# Patient Record
Sex: FEMALE | Race: WHITE | NOT HISPANIC OR LATINO | Employment: FULL TIME | URBAN - METROPOLITAN AREA
[De-identification: names, ages, dates, MRNs, and addresses within clinical notes are randomized per-mention and may not be internally consistent; named-entity substitution may affect disease eponyms.]

---

## 2017-01-10 ENCOUNTER — TELEPHONE (OUTPATIENT)
Dept: GASTROENTEROLOGY | Facility: CLINIC | Age: 67
End: 2017-01-10

## 2017-01-10 NOTE — TELEPHONE ENCOUNTER
Patient states she is gaining weight and has to see endocrinologist and gastro but is concerned about paying 2 copays she will cancel this appointment now and will call back if wants to reschedule.

## 2017-01-11 ENCOUNTER — HOSPITAL ENCOUNTER (OUTPATIENT)
Dept: RADIOLOGY | Facility: HOSPITAL | Age: 67
Discharge: HOME | End: 2017-01-11
Attending: INTERNAL MEDICINE
Payer: MEDICARE

## 2017-01-11 ENCOUNTER — TELEPHONE (OUTPATIENT)
Dept: ENDOCRINOLOGY | Facility: CLINIC | Age: 67
End: 2017-01-11

## 2017-01-11 ENCOUNTER — TELEPHONE (OUTPATIENT)
Dept: FAMILY MEDICINE | Facility: CLINIC | Age: 67
End: 2017-01-11

## 2017-01-11 ENCOUNTER — LAB VISIT (OUTPATIENT)
Dept: LAB | Facility: HOSPITAL | Age: 67
End: 2017-01-11
Attending: INTERNAL MEDICINE
Payer: MEDICARE

## 2017-01-11 ENCOUNTER — OFFICE VISIT (OUTPATIENT)
Dept: ENDOCRINOLOGY | Facility: CLINIC | Age: 67
End: 2017-01-11
Payer: MEDICARE

## 2017-01-11 VITALS
HEIGHT: 62 IN | DIASTOLIC BLOOD PRESSURE: 70 MMHG | HEART RATE: 78 BPM | WEIGHT: 190.25 LBS | BODY MASS INDEX: 35.01 KG/M2 | SYSTOLIC BLOOD PRESSURE: 128 MMHG

## 2017-01-11 DIAGNOSIS — E04.2 MULTINODULAR GOITER: ICD-10-CM

## 2017-01-11 DIAGNOSIS — Z92.3 H/O HEAD AND NECK RADIATION: ICD-10-CM

## 2017-01-11 DIAGNOSIS — E04.2 MULTINODULAR GOITER: Primary | ICD-10-CM

## 2017-01-11 DIAGNOSIS — F17.200 SMOKING: ICD-10-CM

## 2017-01-11 DIAGNOSIS — R63.5 WEIGHT GAIN: Primary | ICD-10-CM

## 2017-01-11 LAB — TSH SERPL DL<=0.005 MIU/L-ACNC: 1.79 UIU/ML

## 2017-01-11 PROCEDURE — 1157F ADVNC CARE PLAN IN RCRD: CPT | Mod: S$GLB,,, | Performed by: INTERNAL MEDICINE

## 2017-01-11 PROCEDURE — 76536 US EXAM OF HEAD AND NECK: CPT | Mod: 26,,, | Performed by: RADIOLOGY

## 2017-01-11 PROCEDURE — 76536 US EXAM OF HEAD AND NECK: CPT | Mod: TC

## 2017-01-11 PROCEDURE — 1160F RVW MEDS BY RX/DR IN RCRD: CPT | Mod: S$GLB,,, | Performed by: INTERNAL MEDICINE

## 2017-01-11 PROCEDURE — 99499 UNLISTED E&M SERVICE: CPT | Mod: S$GLB,,, | Performed by: INTERNAL MEDICINE

## 2017-01-11 PROCEDURE — 36415 COLL VENOUS BLD VENIPUNCTURE: CPT | Mod: PO

## 2017-01-11 PROCEDURE — 99204 OFFICE O/P NEW MOD 45 MIN: CPT | Mod: S$GLB,,, | Performed by: INTERNAL MEDICINE

## 2017-01-11 PROCEDURE — 84443 ASSAY THYROID STIM HORMONE: CPT

## 2017-01-11 PROCEDURE — 99999 PR PBB SHADOW E&M-EST. PATIENT-LVL III: CPT | Mod: PBBFAC,,, | Performed by: INTERNAL MEDICINE

## 2017-01-11 PROCEDURE — 1126F AMNT PAIN NOTED NONE PRSNT: CPT | Mod: S$GLB,,, | Performed by: INTERNAL MEDICINE

## 2017-01-11 PROCEDURE — 1159F MED LIST DOCD IN RCRD: CPT | Mod: S$GLB,,, | Performed by: INTERNAL MEDICINE

## 2017-01-11 RX ORDER — ESTRADIOL 0.1 MG/G
1 CREAM VAGINAL
COMMUNITY
End: 2017-05-29

## 2017-01-11 NOTE — MR AVS SNAPSHOT
[facility]  [address]  [facility] [address]  Phone: [phone]  Fax: [phone]                  Syeda Gonsalves   2017 9:30 AM   Office Visit    Description:  Female : 1950   Provider:  Hailey Carty MD   Department:  [place] - Endocrinology           Reason for Visit     Thyroid Nodule     Hoarsness     Weight Gain           Diagnoses this Visit        Comments    Weight gain    -  Primary     Multinodular goiter         H/O head and neck radiation         Smoking                To Do List           Future Appointments        Provider Department Dept Phone    2017 11:15 AM LAB, [place] [facility] [phone]    2017 3:15 PM NM US1 [facility] [phone]    2017 10:15 AM [facility] - Lab [phone]    2017 11:30 AM Hailey Carty MD [facility] [phone]      Goals (5 Years of Data)     None      [facility]     [facility] Care [facility] -  Assistance  Registered nurses in the [facility] provide clinical advisement, health education, appointment booking, and other advisory services.  Call for this free service at [phone].             Medications           Message regarding Medications     Verify the changes and/or additions to your medication regime listed below are the same as discussed with your clinician today.  If any of these changes or additions are incorrect, please notify your healthcare provider.        STOP taking these medications     buPROPion (WELLBUTRIN) 75 MG tablet Take 1 tablet (75 mg total) by mouth 2 (two) times daily.    estrogens, conjugated, (PREMARIN) 0.45 MG tablet Take 0.45 mg by mouth once daily.           Verify that the below list of medications is an accurate representation of the medications you are currently taking.  If none reported, the list may be blank. If incorrect, please contact your  "healthcare provider. Carry this list with you in case of emergency.           Current Medications     co-enzyme Q-10 30 mg capsule Take 30 mg by mouth 3 (three) times daily.    estradiol (ESTRACE) 0.01 % (0.1 mg/gram) vaginal cream Place 1 g vaginally every 7 days.    fish oil-omega-3 fatty acids 300-1,000 mg capsule Take 2 g by mouth once daily.    GARLIC (GARLIQUE ORAL) Take 1 capsule by mouth once daily.    meloxicam (MOBIC) 7.5 MG tablet Take 1 tablet (7.5 mg total) by mouth once daily.    MULTIVITAMIN (MULTIPLE VITAMINS ORAL) Take by mouth.    omeprazole (PRILOSEC) 20 MG capsule Take 1 capsule (20 mg total) by mouth once daily.    colesevelam (WELCHOL) 625 mg tablet Take 1,875 mg by mouth 2 (two) times daily with meals.           Clinical Reference Information           Vital Signs - Last Recorded  Most recent update: 1/11/2017  9:27 AM by Irena Luque LPN    BP Pulse Ht Wt LMP BMI    128/70 (BP Method: Manual) 78 5' 2" (1.575 m) 86.3 kg (190 lb 4.1 oz) 09/25/1990 34.8 kg/m2      Blood Pressure          Most Recent Value    BP  128/70      Allergies as of 1/11/2017     Flu Vaccine 2011 (36 Mos+)(pf)    Iodine And Iodide Containing Products    Lipitor [Atorvastatin]    Niacin Preparations    Welchol [Colesevelam]    Zetia [Ezetimibe]      Immunizations Administered on Date of Encounter - 1/11/2017     None      Orders Placed During Today's Visit      Normal Orders This Visit    Ambulatory referral to Smoking Cessation Program     Future Labs/Procedures Expected by Expires    TSH  1/11/2017 3/12/2018    US Soft Tissue Head Neck Thyroid  1/11/2017 1/11/2018      "

## 2017-01-11 NOTE — PROGRESS NOTES
Subjective:      Patient ID: Syeda Gonsalves is a 66 y.o. female.    Chief Complaint:  Thyroid Nodule; Hoarsness; and Weight Gain      History of Present Illness    + hoarseness of voice   She had US thyroid in 2016   Per her - her PCP checked TSH, FT4, FT3 and all ernestine per her      She has weight gain of ~ 10 lbs in 3-4 months   She eats 2-3 meals a day   BF : cereal / Yoruba toast - small   Lunch : usually   After work some : sandwich   Mainly abdominal weight gain     Works at [employer] late evenings - 4-5 days a week    No FH of  thyroid cancer  She had radiation of neck for thymus gland shrinking      Past Medical History   Diagnosis Date    Abnormal Pap smear 1990    Cervical cancer 1990     Had a complete hysterectomy    Hyperlipidemia      Past Surgical History   Procedure Laterality Date    Hysterectomy      Oophorectomy      Carpal tunnel release      Adrenalectomy               Review of Systems   Constitutional: Positive for unexpected weight change.   HENT: Positive for voice change. Negative for trouble swallowing.    Eyes: Negative for visual disturbance.   Respiratory: Negative for shortness of breath.         Overnight ' funny noice'   Gastrointestinal: Negative for constipation and diarrhea.   Endocrine: Positive for cold intolerance.   Genitourinary: Negative for menstrual problem (s/p complete hysterectomy ).   Psychiatric/Behavioral: Positive for sleep disturbance (occassional sleep problem ).       Objective:   Physical Exam   Constitutional: She appears well-developed.   HENT:   Right Ear: External ear normal.   Left Ear: External ear normal.   Nose: Nose normal.   Neck: No tracheal deviation present. Thyromegaly (right thyroid nodule ) present.   Cardiovascular: Normal rate.    No murmur heard.  Pulmonary/Chest: Effort normal and breath sounds normal.   Abdominal: Soft. There is no tenderness. No hernia.   Musculoskeletal: She exhibits no edema.   Neurological: She displays normal  reflexes. No cranial nerve deficit.   Skin: No rash noted.          Psychiatric: She has a normal mood and affect. Judgment normal.   Vitals reviewed.      Lab Review:   None     Assessment:     1. Weight gain     2. Multinodular goiter  US Soft Tissue Head Neck Thyroid    TSH   3. H/O head and neck radiation     4. Smoking  Ambulatory referral to Smoking Cessation Program        # MNG  - has h/o radiation to neck   - smokes on/off: advised to quit   - we will get US thyroid reports from her PCP   - also schedule US thyroid     # smoking cessation program referral       Plan:     Follow up:  TSH today   US thyroid and TSH in 4 months  RTC in 4 months     smoking cessation program referral

## 2017-01-11 NOTE — TELEPHONE ENCOUNTER
Palma from Dr Carty's office is looking for imaging on patient's thyroid. After researching I was unable to locate where thyroid imaging was ordered for the patient by Dr. Gaitan at any time

## 2017-01-12 NOTE — TELEPHONE ENCOUNTER
Please let Ms.Syeda DALLIN Gonsalves know her CROWE thyroid shows nodules and we should get FNA of B/L nodule both sides right and left   Also schedule next available

## 2017-01-13 ENCOUNTER — TELEPHONE (OUTPATIENT)
Dept: ENDOCRINOLOGY | Facility: CLINIC | Age: 67
End: 2017-01-13

## 2017-01-13 NOTE — TELEPHONE ENCOUNTER
Pt informed of US results and Dr. Carty's recommendation to do bilateral FNA.  Appt scheduled and pt instructed no ASA or NSAIDS x 72 hrs prior.

## 2017-01-31 ENCOUNTER — OFFICE VISIT (OUTPATIENT)
Dept: FAMILY MEDICINE | Facility: CLINIC | Age: 67
End: 2017-01-31
Payer: MEDICARE

## 2017-01-31 ENCOUNTER — DOCUMENTATION ONLY (OUTPATIENT)
Dept: FAMILY MEDICINE | Facility: CLINIC | Age: 67
End: 2017-01-31

## 2017-01-31 VITALS
HEART RATE: 62 BPM | HEIGHT: 62 IN | TEMPERATURE: 98 F | DIASTOLIC BLOOD PRESSURE: 73 MMHG | RESPIRATION RATE: 16 BRPM | SYSTOLIC BLOOD PRESSURE: 123 MMHG | BODY MASS INDEX: 34.77 KG/M2 | WEIGHT: 188.94 LBS | OXYGEN SATURATION: 97 %

## 2017-01-31 DIAGNOSIS — Z11.59 ENCOUNTER FOR HEPATITIS C SCREENING TEST FOR LOW RISK PATIENT: ICD-10-CM

## 2017-01-31 DIAGNOSIS — Z78.0 POSTMENOPAUSAL: ICD-10-CM

## 2017-01-31 DIAGNOSIS — J02.9 ACUTE PHARYNGITIS, UNSPECIFIED ETIOLOGY: Primary | ICD-10-CM

## 2017-01-31 PROCEDURE — 1157F ADVNC CARE PLAN IN RCRD: CPT | Mod: S$GLB,,, | Performed by: INTERNAL MEDICINE

## 2017-01-31 PROCEDURE — 99213 OFFICE O/P EST LOW 20 MIN: CPT | Mod: S$GLB,,, | Performed by: INTERNAL MEDICINE

## 2017-01-31 PROCEDURE — 1160F RVW MEDS BY RX/DR IN RCRD: CPT | Mod: S$GLB,,, | Performed by: INTERNAL MEDICINE

## 2017-01-31 PROCEDURE — 1125F AMNT PAIN NOTED PAIN PRSNT: CPT | Mod: S$GLB,,, | Performed by: INTERNAL MEDICINE

## 2017-01-31 PROCEDURE — 1159F MED LIST DOCD IN RCRD: CPT | Mod: S$GLB,,, | Performed by: INTERNAL MEDICINE

## 2017-01-31 RX ORDER — PREDNISONE 20 MG/1
40 TABLET ORAL DAILY
Qty: 4 TABLET | Refills: 0 | Status: SHIPPED | OUTPATIENT
Start: 2017-01-31 | End: 2017-02-02

## 2017-01-31 RX ORDER — SUCRALFATE 1 G/1
TABLET ORAL
Qty: 32 TABLET | Refills: 0 | Status: SHIPPED | OUTPATIENT
Start: 2017-01-31 | End: 2017-02-10 | Stop reason: SDUPTHER

## 2017-01-31 NOTE — PATIENT INSTRUCTIONS
Warm compresses to face. Saline washing. Steam treatments.       Chloraseptic lozenges during the day.

## 2017-01-31 NOTE — PROGRESS NOTES
"Subjective:       Patient ID: Syeda Gonsalves is a 66 y.o. female.    Chief Complaint: Sore Throat; Fatigue; and Sinus Problem    HPI       CHIEF COMPLAINT: Sore_Throat(+).  HPI:     ONSET/TIMING:   Started     5 days and 6 ago.  Similar problems in past: no.      DURATION:    continuous    QUALITY/COURSE:    .   unchanged    LOCATION:  bilateral    INTENSITY  SEVERITY: # 5 / 10 (on 1 to 10 scale).    CONTEXT/WHEN:   Similar problems: no. Physician visits:  no . Exposure_to_others_with_similar_symptoms: no  Exposure_to_others_with_similar_documented strep throat: no  MODIFIERS/TREATMENTS: . Analgesics: yes   Antibiotics: no.        The following symptoms are positive if BOLDED, otherwise negative.        SYMPTOMS/RELATED:  Lymphadenopathy.Voice_Change.    Heartburn .         Review of Systems   Constitutional: Negative for fever.   HENT: Positive for sinus pressure and sore throat. Negative for ear pain, rhinorrhea, sneezing and trouble swallowing.    Respiratory: Negative for cough.    Neurological: Negative for headaches.       Objective:      Vitals:    01/31/17 1153   BP: 123/73   Pulse: 62   Resp: 16   Temp: 97.7 °F (36.5 °C)   TempSrc: Oral   SpO2: 97%   Weight: 85.7 kg (188 lb 15 oz)   Height: 5' 2" (1.575 m)   PainSc:   7   PainLoc: Throat     Physical Exam   Constitutional: She appears well-developed and well-nourished.   HENT:   Right Ear: External ear normal.   Left Ear: External ear normal.   Mouth/Throat: Oropharynx is clear and moist. No oropharyngeal exudate.   Cardiovascular: Normal rate, regular rhythm and normal heart sounds.  Exam reveals no friction rub.    No murmur heard.  Pulmonary/Chest: Effort normal and breath sounds normal. No respiratory distress. She has no wheezes. She has no rales. She exhibits no tenderness.   Abdominal: Soft. Bowel sounds are normal. There is no tenderness.   Musculoskeletal: She exhibits no edema.   Lymphadenopathy:     She has no cervical adenopathy.   Nursing " note and vitals reviewed.        Assessment:       1. Acute pharyngitis, unspecified etiology    2. Encounter for hepatitis C screening test for low risk patient    3. Postmenopausal          Plan:     Acute pharyngitis, unspecified etiology  -     sucralfate (CARAFATE) 1 gram tablet; 4 by mouth dissolved with cranberry juice at bedtime, gargle and swallow  Dispense: 32 tablet; Refill: 0  -     predniSONE (DELTASONE) 20 MG tablet; Take 2 tablets (40 mg total) by mouth once daily.  Dispense: 4 tablet; Refill: 0    Encounter for hepatitis C screening test for low risk patient  -     Hepatitis C antibody; Future; Expected date: 1/31/17    Postmenopausal  -     DXA Bone Density Spine And Hip_Axial Skeleton; Future; Expected date: 1/31/17      Return for if you are not better return in one week.

## 2017-01-31 NOTE — PROGRESS NOTES
Health Maintenance Due   Topic Date Due    Hepatitis C Screening  1950    DEXA SCAN  12/31/1989    Colonoscopy  12/31/1999    Influenza Vaccine  08/01/2016    Lipid Panel  12/14/2016    Pneumococcal (65+) (2 of 2 - PPSV23) 12/23/2016

## 2017-01-31 NOTE — MR AVS SNAPSHOT
[facility]  [address] [address]  Phone: [phone]  Fax: [phone]                  Syeda Gonsalves   2017 11:00 AM   Office Visit    Description:  Female : 1950   Provider:  Rikki Gaitan MD   Department:  [facility]           Reason for Visit     Sore Throat     Fatigue     Sinus Problem           Diagnoses this Visit        Comments    Acute pharyngitis, unspecified etiology    -  Primary     Encounter for hepatitis C screening test for low risk patient         Postmenopausal                To Do List           Future Appointments        Provider Department Dept Phone    2017 1:40 PM [facility] DEXA 1 [facility] [phone]    [facility] [phone]    2017 11[facility]30 [facility] [phone]    2017 10:15 AM [facility] - Lab [phone]    2017 11:30 AM Hailey Carty MD [facility] Endocrinology [phone]      Goals (5 Years of Data)     None      Follow-Up and Disposition     Return for if you are not better return in one week.    Follow-up and Disposition History       These Medications        Disp Refills Start End    sucralfate (CARAFATE) 1 gram tablet 32 tablet 0 2017     4 by mouth dissolved with cranberry juice at bedtime, gargle and swallow    Pharmacy: [facility] [address] [place] [id number] [employer] Ph #: [phone]       predniSONE (DELTASONE) 20 MG tablet 4 tablet 0 2017    Take 2 tablets (40 mg total) by mouth once daily. - Oral    Pharmacy: [facility] [address] [place] [id number] [employer] Ph #: [phone]         [facility]     [facility] Care [facility] -  Assistance  Registered nurses in the [facility] Call Center provide clinical advisement, health education, appointment booking, and other advisory  services.  Call for this free service at [phone].             Medications           Message regarding Medications     Verify the changes and/or additions to your medication regime listed below are the same as discussed with your clinician today.  If any of these changes or additions are incorrect, please notify your healthcare provider.        START taking these NEW medications        Refills    sucralfate (CARAFATE) 1 gram tablet 0    Si by mouth dissolved with cranberry juice at bedtime, gargle and swallow    Class: Normal    predniSONE (DELTASONE) 20 MG tablet 0    Sig: Take 2 tablets (40 mg total) by mouth once daily.    Class: Normal    Route: Oral      STOP taking these medications     colesevelam (WELCHOL) 625 mg tablet Take 1,875 mg by mouth 2 (two) times daily with meals.    omeprazole (PRILOSEC) 20 MG capsule Take 1 capsule (20 mg total) by mouth once daily.           Verify that the below list of medications is an accurate representation of the medications you are currently taking.  If none reported, the list may be blank. If incorrect, please contact your healthcare provider. Carry this list with you in case of emergency.           Current Medications     co-enzyme Q-10 30 mg capsule Take 30 mg by mouth 3 (three) times daily.    estradiol (ESTRACE) 0.01 % (0.1 mg/gram) vaginal cream Place 1 g vaginally every 7 days.    fish oil-omega-3 fatty acids 300-1,000 mg capsule Take 2 g by mouth once daily.    GARLIC (GARLIQUE ORAL) Take 1 capsule by mouth once daily.    meloxicam (MOBIC) 7.5 MG tablet Take 1 tablet (7.5 mg total) by mouth once daily.    MULTIVITAMIN (MULTIPLE VITAMINS ORAL) Take by mouth.    predniSONE (DELTASONE) 20 MG tablet Take 2 tablets (40 mg total) by mouth once daily.    sucralfate (CARAFATE) 1 gram tablet 4 by mouth dissolved with cranberry juice at bedtime, gargle and swallow           Clinical Reference Information           Vital Signs - Last Recorded  Most recent update:  "1/31/2017 11:56 AM by Yoselyn Smith MA    BP Pulse Temp Resp Ht Wt    123/73 (BP Location: Right arm, Patient Position: Sitting, BP Method: Automatic) 62 97.7 °F (36.5 °C) (Oral) 16 5' 2" (1.575 m) 85.7 kg (188 lb 15 oz)    LMP SpO2 BMI          09/25/1990 97% 34.56 kg/m2        Blood Pressure          Most Recent Value    BP  123/73      Allergies as of 1/31/2017     Flu Vaccine 2011 (36 Mos+)(pf)    Iodine And Iodide Containing Products    Lipitor [Atorvastatin]    Niacin Preparations    Welchol [Colesevelam]    Zetia [Ezetimibe]      Immunizations Administered on Date of Encounter - 1/31/2017     None      Orders Placed During Today's Visit     Future Labs/Procedures Expected by Expires    DXA Bone Density Spine And Hip_Axial Skeleton  1/31/2017 5/1/2017    Hepatitis C antibody  1/31/2017 4/1/2018      Instructions    Warm compresses to face. Saline washing. Steam treatments.       Chloraseptic lozenges during the day.             "

## 2017-02-01 ENCOUNTER — HOSPITAL ENCOUNTER (OUTPATIENT)
Dept: RADIOLOGY | Facility: HOSPITAL | Age: 67
Discharge: HOME | End: 2017-02-01
Attending: INTERNAL MEDICINE
Payer: MEDICARE

## 2017-02-01 DIAGNOSIS — Z78.0 POSTMENOPAUSAL: ICD-10-CM

## 2017-02-01 PROCEDURE — 77080 DXA BONE DENSITY AXIAL: CPT | Mod: TC

## 2017-02-01 PROCEDURE — 77080 DXA BONE DENSITY AXIAL: CPT | Mod: 26,,, | Performed by: RADIOLOGY

## 2017-02-02 ENCOUNTER — TELEPHONE (OUTPATIENT)
Dept: FAMILY MEDICINE | Facility: CLINIC | Age: 67
End: 2017-02-02

## 2017-02-02 NOTE — TELEPHONE ENCOUNTER
----- Message from Christina Del Cid sent at 2/2/2017 11:10 AM CST -----  Patient cancelled her lab appt bindu & wants to reschedule for next Thursday please call [phone] (home)

## 2017-02-09 ENCOUNTER — CLINICAL SUPPORT (OUTPATIENT)
Dept: FAMILY MEDICINE | Facility: CLINIC | Age: 67
End: 2017-02-09
Payer: MEDICARE

## 2017-02-09 ENCOUNTER — TELEPHONE (OUTPATIENT)
Dept: FAMILY MEDICINE | Facility: CLINIC | Age: 67
End: 2017-02-09

## 2017-02-09 DIAGNOSIS — Z11.59 ENCOUNTER FOR HEPATITIS C SCREENING TEST FOR LOW RISK PATIENT: ICD-10-CM

## 2017-02-09 PROCEDURE — 86803 HEPATITIS C AB TEST: CPT

## 2017-02-09 NOTE — TELEPHONE ENCOUNTER
----- Message from Mayra Thompson sent at 2/9/2017  3:54 PM CST -----  Pt called asking for a call back stated that she think she may need an antibiotic/pls call back at [phone]

## 2017-02-10 ENCOUNTER — OFFICE VISIT (OUTPATIENT)
Dept: FAMILY MEDICINE | Facility: CLINIC | Age: 67
End: 2017-02-10
Payer: MEDICARE

## 2017-02-10 VITALS
HEART RATE: 58 BPM | WEIGHT: 188.06 LBS | HEIGHT: 62 IN | OXYGEN SATURATION: 98 % | BODY MASS INDEX: 34.61 KG/M2 | TEMPERATURE: 98 F | DIASTOLIC BLOOD PRESSURE: 65 MMHG | SYSTOLIC BLOOD PRESSURE: 134 MMHG

## 2017-02-10 DIAGNOSIS — Z13.6 ENCOUNTER FOR LIPID SCREENING FOR CARDIOVASCULAR DISEASE: ICD-10-CM

## 2017-02-10 DIAGNOSIS — Z13.29 SCREENING FOR ENDOCRINE, NUTRITIONAL, METABOLIC AND IMMUNITY DISORDER: ICD-10-CM

## 2017-02-10 DIAGNOSIS — Z13.220 SCREENING CHOLESTEROL LEVEL: ICD-10-CM

## 2017-02-10 DIAGNOSIS — Z13.0 SCREENING FOR ENDOCRINE, NUTRITIONAL, METABOLIC AND IMMUNITY DISORDER: ICD-10-CM

## 2017-02-10 DIAGNOSIS — J02.9 ACUTE PHARYNGITIS, UNSPECIFIED ETIOLOGY: ICD-10-CM

## 2017-02-10 DIAGNOSIS — L25.9 CONTACT DERMATITIS AND ECZEMA: ICD-10-CM

## 2017-02-10 DIAGNOSIS — Z13.228 SCREENING FOR ENDOCRINE, NUTRITIONAL, METABOLIC AND IMMUNITY DISORDER: ICD-10-CM

## 2017-02-10 DIAGNOSIS — Z13.21 SCREENING FOR ENDOCRINE, NUTRITIONAL, METABOLIC AND IMMUNITY DISORDER: ICD-10-CM

## 2017-02-10 DIAGNOSIS — J30.1 NON-SEASONAL ALLERGIC RHINITIS DUE TO POLLEN: ICD-10-CM

## 2017-02-10 DIAGNOSIS — J02.9 SORE THROAT: Primary | ICD-10-CM

## 2017-02-10 DIAGNOSIS — Z23 NEED FOR STREPTOCOCCUS PNEUMONIAE VACCINATION: ICD-10-CM

## 2017-02-10 DIAGNOSIS — Z13.220 ENCOUNTER FOR LIPID SCREENING FOR CARDIOVASCULAR DISEASE: ICD-10-CM

## 2017-02-10 LAB
ALBUMIN SERPL BCP-MCNC: 3.5 G/DL
ALP SERPL-CCNC: 66 U/L
ALT SERPL W/O P-5'-P-CCNC: 13 U/L
ANION GAP SERPL CALC-SCNC: 9 MMOL/L
AST SERPL-CCNC: 19 U/L
BILIRUB SERPL-MCNC: 0.6 MG/DL
BUN SERPL-MCNC: 12 MG/DL
CALCIUM SERPL-MCNC: 9.5 MG/DL
CHLORIDE SERPL-SCNC: 104 MMOL/L
CHOLEST/HDLC SERPL: 5.3 {RATIO}
CO2 SERPL-SCNC: 27 MMOL/L
CREAT SERPL-MCNC: 0.8 MG/DL
CTP QC/QA: YES
EST. GFR  (AFRICAN AMERICAN): >60 ML/MIN/1.73 M^2
EST. GFR  (NON AFRICAN AMERICAN): >60 ML/MIN/1.73 M^2
GLUCOSE SERPL-MCNC: 87 MG/DL
HCV AB SERPL QL IA: NEGATIVE
HDL/CHOLESTEROL RATIO: 19 %
HDLC SERPL-MCNC: 211 MG/DL
HDLC SERPL-MCNC: 40 MG/DL
LDLC SERPL CALC-MCNC: 137.8 MG/DL
NONHDLC SERPL-MCNC: 171 MG/DL
POTASSIUM SERPL-SCNC: 4.2 MMOL/L
PROT SERPL-MCNC: 6.9 G/DL
S PYO RRNA THROAT QL PROBE: NEGATIVE
SODIUM SERPL-SCNC: 140 MMOL/L
TRIGL SERPL-MCNC: 166 MG/DL

## 2017-02-10 PROCEDURE — 80061 LIPID PANEL: CPT

## 2017-02-10 PROCEDURE — 90732 PPSV23 VACC 2 YRS+ SUBQ/IM: CPT | Mod: S$GLB,,, | Performed by: NURSE PRACTITIONER

## 2017-02-10 PROCEDURE — 1159F MED LIST DOCD IN RCRD: CPT | Mod: S$GLB,,, | Performed by: NURSE PRACTITIONER

## 2017-02-10 PROCEDURE — 80053 COMPREHEN METABOLIC PANEL: CPT

## 2017-02-10 PROCEDURE — 87880 STREP A ASSAY W/OPTIC: CPT | Mod: ,,, | Performed by: NURSE PRACTITIONER

## 2017-02-10 PROCEDURE — 1125F AMNT PAIN NOTED PAIN PRSNT: CPT | Mod: S$GLB,,, | Performed by: NURSE PRACTITIONER

## 2017-02-10 PROCEDURE — G0009 ADMIN PNEUMOCOCCAL VACCINE: HCPCS | Mod: S$GLB,,, | Performed by: NURSE PRACTITIONER

## 2017-02-10 PROCEDURE — 1160F RVW MEDS BY RX/DR IN RCRD: CPT | Mod: S$GLB,,, | Performed by: NURSE PRACTITIONER

## 2017-02-10 PROCEDURE — 99213 OFFICE O/P EST LOW 20 MIN: CPT | Mod: 25,S$GLB,, | Performed by: NURSE PRACTITIONER

## 2017-02-10 PROCEDURE — 1157F ADVNC CARE PLAN IN RCRD: CPT | Mod: S$GLB,,, | Performed by: NURSE PRACTITIONER

## 2017-02-10 RX ORDER — SUCRALFATE 1 G/1
TABLET ORAL
Qty: 32 TABLET | Refills: 1 | Status: SHIPPED | OUTPATIENT
Start: 2017-02-10 | End: 2017-03-06

## 2017-02-10 RX ORDER — MONTELUKAST SODIUM 5 MG/1
5 TABLET, CHEWABLE ORAL NIGHTLY
Qty: 30 TABLET | Refills: 11 | Status: SHIPPED | OUTPATIENT
Start: 2017-02-10 | End: 2017-03-12

## 2017-02-10 RX ORDER — FLUOCINONIDE 0.5 MG/G
CREAM TOPICAL 2 TIMES DAILY
Qty: 1 TUBE | Refills: 0 | Status: SHIPPED | OUTPATIENT
Start: 2017-02-10 | End: 2017-03-22

## 2017-02-10 NOTE — PROGRESS NOTES
Subjective:       Patient ID: Syeda Gonsalves is a 66 y.o. female.    Chief Complaint: Sore Throat    Sore Throat    This is a new problem. The current episode started 1 to 4 weeks ago. The problem has been gradually improving. There has been no fever. She has had no exposure to strep or mono. She has tried gargles for the symptoms. The treatment provided mild relief.     Review of Systems   HENT: Positive for sore throat.        Objective:      Physical Exam   Constitutional: She appears well-developed and well-nourished. No distress.   HENT:   Head: Normocephalic and atraumatic.   Right Ear: External ear normal.   Left Ear: External ear normal.   Nose: Nose normal.   Mouth/Throat: Oropharynx is clear and moist. No oropharyngeal exudate.   Eyes: Conjunctivae are normal. Right eye exhibits no discharge. Left eye exhibits no discharge. No scleral icterus.   Neck: Normal range of motion. Neck supple.   Cardiovascular: Normal rate, regular rhythm and normal heart sounds.  Exam reveals no gallop and no friction rub.    No murmur heard.  Pulmonary/Chest: Effort normal and breath sounds normal. No respiratory distress. She has no wheezes. She has no rales.   Lymphadenopathy:     She has no cervical adenopathy.   Skin: Skin is warm and dry. She is not diaphoretic.   Psychiatric: She has a normal mood and affect. Her behavior is normal.       Assessment:       1. Sore throat    2. Contact dermatitis and eczema    3. Non-seasonal allergic rhinitis due to pollen    4. Need for Streptococcus pneumoniae vaccination    5. Acute pharyngitis, unspecified etiology    6. Screening cholesterol level    7. Encounter for lipid screening for cardiovascular disease    8. Screening for endocrine, nutritional, metabolic and immunity disorder        Plan:       Sore throat  -     POCT rapid strep A    Contact dermatitis and eczema  -     fluocinonide 0.05% (LIDEX) 0.05 % cream; Apply topically 2 (two) times daily. Use only on rash, not  surrounding skin, no longer than 2 weeks  Dispense: 1 Tube; Refill: 0    Non-seasonal allergic rhinitis due to pollen  -     montelukast (SINGULAIR) 5 MG chewable tablet; Take 1 tablet (5 mg total) by mouth every evening.  Dispense: 30 tablet; Refill: 11    Need for Streptococcus pneumoniae vaccination  -     Pneumococcal Polysaccharide Vaccine (23 Valent) (SQ/IM)    Acute pharyngitis, unspecified etiology  -     sucralfate (CARAFATE) 1 gram tablet; 4 by mouth dissolved with cranberry juice at bedtime, gargle and swallow  Dispense: 32 tablet; Refill: 1    Screening cholesterol level    Encounter for lipid screening for cardiovascular disease  -     Lipid panel; Future; Expected date: 2/10/17    Screening for endocrine, nutritional, metabolic and immunity disorder  -     Comprehensive metabolic panel; Future; Expected date: 2/10/17    3 months

## 2017-02-10 NOTE — MR AVS SNAPSHOT
[facility]  [address] [address] [address]  Phone: [phone]  Fax: [phone]                  Syeda Gonsalves   2/10/2017 11:00 AM   Office Visit    Description:  Female : 1950   Provider:  CLEOPATRA Andres   Department:  [facility]           Reason for Visit     Sore Throat           Diagnoses this Visit        Comments    Sore throat    -  Primary     Contact dermatitis and eczema         Non-seasonal allergic rhinitis due to pollen         Need for Streptococcus pneumoniae vaccination         Acute pharyngitis, unspecified etiology         Screening cholesterol level         Encounter for lipid screening for cardiovascular disease         Screening for endocrine, nutritional, metabolic and immunity disorder                To Do List           Future Appointments        Provider Department Dept Phone    2017 11:30 AM MD Serafin Scruggs - Endocrinology [phone]    2017 10:15 AM ALFREDO POE SAT Alfredo [facility] - Lab [phone]    2017 11:30 AM Hailey Carty MD [facility] Endocrinology [phone]      Goals (5 Years of Data)     None      Follow-Up and Disposition     Return in about 3 months (around 5/10/2017), or if symptoms worsen or fail to improve.       These Medications        Disp Refills Start End    fluocinonide 0.05% (LIDEX) 0.05 % cream 1 Tube 0 2/10/2017     Apply topically 2 (two) times daily. Use only on rash, not surrounding skin, no longer than 2 weeks - Topical (Top)    Pharmacy: [facility] [address] [place] [address] [employer] Ph #: [phone]       montelukast (SINGULAIR) 5 MG chewable tablet 30 tablet 11 2/10/2017 3/12/2017    Take 1 tablet (5 mg total) by mouth every evening. - Oral    Pharmacy: [facility] [address] [place], [place] [address] - [address] [employer] Ph #: [phone]       Notes to Pharmacy: Patient unable to tolerate adult dose, giving children's dose to build  up tolerance.    sucralfate (CARAFATE) 1 gram tablet 32 tablet 1 2/10/2017     4 by mouth dissolved with cranberry juice at bedtime, gargle and swallow    Pharmacy: [facility] [address] [place] [address] #: [phone]         [facility]     [facility] On Call Nurse [facility] - 24/7 Assistance  Registered nurses in the [facility] On Call Center provide clinical advisement, health education, appointment booking, and other advisory services.  Call for this free service at [phone].             Medications           Message regarding Medications     Verify the changes and/or additions to your medication regime listed below are the same as discussed with your clinician today.  If any of these changes or additions are incorrect, please notify your healthcare provider.        START taking these NEW medications        Refills    fluocinonide 0.05% (LIDEX) 0.05 % cream 0    Sig: Apply topically 2 (two) times daily. Use only on rash, not surrounding skin, no longer than 2 weeks    Class: Normal    Route: Topical (Top)    montelukast (SINGULAIR) 5 MG chewable tablet 11    Sig: Take 1 tablet (5 mg total) by mouth every evening.    Class: Normal    Route: Oral           Verify that the below list of medications is an accurate representation of the medications you are currently taking.  If none reported, the list may be blank. If incorrect, please contact your healthcare provider. Carry this list with you in case of emergency.           Current Medications     co-enzyme Q-10 30 mg capsule Take 30 mg by mouth 3 (three) times daily.    estradiol (ESTRACE) 0.01 % (0.1 mg/gram) vaginal cream Place 1 g vaginally every 7 days.    fish oil-omega-3 fatty acids 300-1,000 mg capsule Take 2 g by mouth once daily.    GARLIC (GARLIQUE ORAL) Take 1 capsule by mouth once daily.    meloxicam (MOBIC) 7.5 MG tablet Take 1 tablet (7.5 mg total) by mouth once daily.    MULTIVITAMIN (MULTIPLE VITAMINS ORAL) Take by mouth.  "   sucralfate (CARAFATE) 1 gram tablet 4 by mouth dissolved with cranberry juice at bedtime, gargle and swallow    fluocinonide 0.05% (LIDEX) 0.05 % cream Apply topically 2 (two) times daily. Use only on rash, not surrounding skin, no longer than 2 weeks    montelukast (SINGULAIR) 5 MG chewable tablet Take 1 tablet (5 mg total) by mouth every evening.           Clinical Reference Information           Your Vitals Were     BP Pulse Temp Height Weight Last Period    134/65 (BP Location: Left arm, Patient Position: Sitting, BP Method: Automatic) 58 97.9 °F (36.6 °C) (Oral) 5' 2" (1.575 m) 85.3 kg (188 lb 0.8 oz) 09/25/1990    SpO2 BMI             98% 34.4 kg/m2         Blood Pressure          Most Recent Value    BP  134/65      Allergies as of 2/10/2017     Flu Vaccine 2011 (36 Mos+)(pf)    Iodine And Iodide Containing Products    Lipitor [Atorvastatin]    Niacin Preparations    Welchol [Colesevelam]    Zetia [Ezetimibe]      Immunizations Administered on Date of Encounter - 2/10/2017     Name Date Dose VIS Date Route    Pneumococcal Polysaccharide - 23 Valent  Incomplete 0.5 mL 4/24/2015 Intramuscular      Orders Placed During Today's Visit      Normal Orders This Visit    Pneumococcal Polysaccharide Vaccine (23 Valent) (SQ/IM)     POCT rapid strep A     Future Labs/Procedures Expected by Expires    Comprehensive metabolic panel  2/10/2017 2/10/2018    Lipid panel  2/10/2017 2/10/2018      Language Assistance Services     ATTENTION: Language assistance services are available, free of charge. Please call [phone].      ATENCIÓN: Si habla español, tiene a poole disposición servicios gratuitos de asistencia lingüística. Llame al [phone].     CHÚ Ý: N?u b?n nói Ti?ng Vi?t, có các d?ch v? h? tr? ngôn ng? mi?n phí dành cho b?n. G?i s? [phone].         [facility] complies with applicable Federal civil rights laws and does not discriminate on the basis of race, color, national origin, age, " disability, or sex.

## 2017-02-10 NOTE — PROGRESS NOTES
Patient ID by name and  Pneumonia vac unit adult dose 0.50 ml IM given in left deltoid Tolerated well Aseptic tech used, no bleeding at the site noted observed for 15 min no adverse reaction noted.

## 2017-02-14 ENCOUNTER — TELEPHONE (OUTPATIENT)
Dept: FAMILY MEDICINE | Facility: CLINIC | Age: 67
End: 2017-02-14

## 2017-02-14 NOTE — TELEPHONE ENCOUNTER
----- Message from CLEOPATRA Andres sent at 2/12/2017 11:36 AM CST -----  Please notify patient that their labs are normal except cholesterol is borderline high. ral

## 2017-02-14 NOTE — TELEPHONE ENCOUNTER
Pt notified of results and verbalized full understanding.  Request for copy of results are at the  for p/u.

## 2017-02-14 NOTE — TELEPHONE ENCOUNTER
----- Message from Emily Rivera sent at 2/14/2017  2:01 PM CST -----  Contact: self   Placed call to pod, patient missed call from your office please call back at [phone] (tjxj)

## 2017-02-14 NOTE — TELEPHONE ENCOUNTER
----- Message from Rikki Gaitan MD sent at 2/10/2017  3:44 PM CST -----  Notify the patient that the hepatitis C test was normal

## 2017-02-20 ENCOUNTER — OFFICE VISIT (OUTPATIENT)
Dept: ENDOCRINOLOGY | Facility: CLINIC | Age: 67
End: 2017-02-20
Payer: MEDICARE

## 2017-02-20 VITALS
WEIGHT: 188.94 LBS | HEIGHT: 62 IN | HEART RATE: 75 BPM | DIASTOLIC BLOOD PRESSURE: 74 MMHG | BODY MASS INDEX: 34.77 KG/M2 | SYSTOLIC BLOOD PRESSURE: 128 MMHG

## 2017-02-20 DIAGNOSIS — E04.2 MULTINODULAR GOITER: Primary | ICD-10-CM

## 2017-02-20 PROCEDURE — 99999 PR PBB SHADOW E&M-EST. PATIENT-LVL III: CPT | Mod: PBBFAC,,, | Performed by: INTERNAL MEDICINE

## 2017-02-20 PROCEDURE — 99499 UNLISTED E&M SERVICE: CPT | Mod: S$GLB,,, | Performed by: INTERNAL MEDICINE

## 2017-02-20 PROCEDURE — 88173 CYTOPATH EVAL FNA REPORT: CPT | Mod: 26,,, | Performed by: PATHOLOGY

## 2017-02-20 PROCEDURE — 76942 ECHO GUIDE FOR BIOPSY: CPT | Mod: S$GLB,,, | Performed by: INTERNAL MEDICINE

## 2017-02-20 PROCEDURE — 88173 CYTOPATH EVAL FNA REPORT: CPT | Performed by: PATHOLOGY

## 2017-02-20 PROCEDURE — 10022 PR FINE NEEDLE ASP;W/IMAGING GUIDANCE: CPT | Mod: LT,S$GLB,, | Performed by: INTERNAL MEDICINE

## 2017-02-20 NOTE — PROGRESS NOTES
Thyroid ultrasound for FNA - B/L    Indication:  nontoxic multi-nodular goiter       Procedure: Ultrasound guidance for fine needle aspiration biopsy  Procedure time out noted.    Real time ultrasound was performed in 2 planes using a [employer]  ultrasound machine.   The nodules were identified as described in report.  Following informed consent and sterile preparation, local anesthesia was achieved using  lidocaine spray. FNA guidance was performed by me  using direct u/s guidance to confirm accurate needle placement.  Four aspirations were made in right using 27 gauge needles and three aspirations were made in left using 27 gauge needles.  There was minimal blood loss.  Samples were submitted for cytology.  The patient tolerated the procedure well without complication.  After care instructions were provided.      Post operative diagnosis: nontoxic multi-nodular goiter       Impression:  Uncomplicated FNA biopsy of thyroid nodule under U/S guidance.    Pain scale prior to FNA : 0  Pain scale after FNA : 1

## 2017-03-01 ENCOUNTER — TELEPHONE (OUTPATIENT)
Dept: ENDOCRINOLOGY | Facility: CLINIC | Age: 67
End: 2017-03-01

## 2017-03-03 ENCOUNTER — TELEPHONE (OUTPATIENT)
Dept: ENDOCRINOLOGY | Facility: CLINIC | Age: 67
End: 2017-03-03

## 2017-03-03 DIAGNOSIS — M54.2 NECK PAIN: ICD-10-CM

## 2017-03-03 RX ORDER — MELOXICAM 7.5 MG/1
7.5 TABLET ORAL DAILY
Qty: 30 TABLET | Refills: 11 | Status: SHIPPED | OUTPATIENT
Start: 2017-03-03 | End: 2017-08-10 | Stop reason: SDUPTHER

## 2017-03-03 NOTE — TELEPHONE ENCOUNTER
----- Message from Bebe Murcia sent at 3/3/2017 11:37 AM CST -----  Contact: self [phone]  Patient requesting a refill on omeprazole and mobic.    Patient will be using   [facility] [address] [place] [address] [address]  [address] [facility] [address]  Phone: [phone] Fax: [phone]    Please call patient at [phone]. Thanks!

## 2017-03-03 NOTE — TELEPHONE ENCOUNTER
Please schedule FNA of left nodule for Ms.Syeda ZAMARRIPA Major   If her insurance will not cover we will discuss other options, she will call her insurance

## 2017-03-03 NOTE — TELEPHONE ENCOUNTER
Last ov was with mrs nunes jenaro on 02/10/2017 and dr king on 01/31/2017.    I do not see where this pt is on omeprazole.

## 2017-03-04 DIAGNOSIS — K21.9 GASTROESOPHAGEAL REFLUX DISEASE, ESOPHAGITIS PRESENCE NOT SPECIFIED: ICD-10-CM

## 2017-03-06 RX ORDER — OMEPRAZOLE 20 MG/1
CAPSULE, DELAYED RELEASE ORAL
Qty: 30 CAPSULE | Refills: 1 | Status: SHIPPED | OUTPATIENT
Start: 2017-03-06 | End: 2017-07-03

## 2017-03-07 ENCOUNTER — TELEPHONE (OUTPATIENT)
Dept: ENDOCRINOLOGY | Facility: CLINIC | Age: 67
End: 2017-03-07

## 2017-03-07 NOTE — TELEPHONE ENCOUNTER
Pt informed we will be working her in on 3/22/17.  She is going to try to arrange to be off that day, and if not, she will call to reschedule.

## 2017-03-07 NOTE — TELEPHONE ENCOUNTER
----- Message from Saba Daly sent at 3/7/2017 12:05 PM CST -----  Contact: self  Patient would like to speak to a nurse regarding the test that is scheduled for 3/22/17   Please call  to advise.     Thanks

## 2017-03-08 ENCOUNTER — TELEPHONE (OUTPATIENT)
Dept: ENDOCRINOLOGY | Facility: CLINIC | Age: 67
End: 2017-03-08

## 2017-03-21 ENCOUNTER — TELEPHONE (OUTPATIENT)
Dept: FAMILY MEDICINE | Facility: CLINIC | Age: 67
End: 2017-03-21

## 2017-03-21 NOTE — TELEPHONE ENCOUNTER
----- Message from Artemio Lombardi sent at 3/21/2017  2:18 PM CDT -----  Contact: same  Patient called in and stated she miss the call back from office and would like them to call her back again at [phone]

## 2017-03-21 NOTE — TELEPHONE ENCOUNTER
----- Message from Tila Davies sent at 3/20/2017  1:44 PM CDT -----  Contact: Patient  Patient called requesting a survey to be sent to her at [address] [place] [address]. Thanks,

## 2017-03-22 ENCOUNTER — OFFICE VISIT (OUTPATIENT)
Dept: ENDOCRINOLOGY | Facility: CLINIC | Age: 67
End: 2017-03-22
Payer: MEDICARE

## 2017-03-22 VITALS
SYSTOLIC BLOOD PRESSURE: 120 MMHG | HEIGHT: 62 IN | BODY MASS INDEX: 35.14 KG/M2 | WEIGHT: 190.94 LBS | DIASTOLIC BLOOD PRESSURE: 70 MMHG | HEART RATE: 61 BPM

## 2017-03-22 DIAGNOSIS — E04.2 MULTINODULAR GOITER: Primary | ICD-10-CM

## 2017-03-22 PROCEDURE — 99999 PR PBB SHADOW E&M-EST. PATIENT-LVL III: CPT | Mod: PBBFAC,,, | Performed by: INTERNAL MEDICINE

## 2017-03-22 PROCEDURE — 10022 PR FINE NEEDLE ASP;W/IMAGING GUIDANCE: CPT | Mod: S$GLB,,, | Performed by: INTERNAL MEDICINE

## 2017-03-22 PROCEDURE — 99499 UNLISTED E&M SERVICE: CPT | Mod: S$GLB,,, | Performed by: INTERNAL MEDICINE

## 2017-03-22 PROCEDURE — 88173 CYTOPATH EVAL FNA REPORT: CPT | Performed by: PATHOLOGY

## 2017-03-22 PROCEDURE — 88173 CYTOPATH EVAL FNA REPORT: CPT | Mod: 26,,, | Performed by: PATHOLOGY

## 2017-03-22 PROCEDURE — 76942 ECHO GUIDE FOR BIOPSY: CPT | Mod: 26,S$GLB,, | Performed by: INTERNAL MEDICINE

## 2017-03-22 NOTE — PROGRESS NOTES
Thyroid ultrasound for FNA    Indication:  nontoxic multi-nodular goiter - left        Procedure: Ultrasound guidance for fine needle aspiration biopsy  Procedure time out noted.    Real time ultrasound was performed in 2 planes using a [employer]  ultrasound machine.   The nodule was identified as described in report.  Following informed consent and sterile preparation, local anesthesia was achieved using  lidocaine spray. FNA guidance was performed by me using direct u/s guidance to confirm accurate needle placement.  Four aspirations were made using 27 gauge needles.  There was minimal blood loss.  Samples were submitted for cytology.  The patient tolerated the procedure well without complication.  After care instructions were provided.      Post operative diagnosis:  thyroid nodule   or nontoxic multi-nodular goiter       Impression:  Uncomplicated FNA biopsy of thyroid nodule under U/S guidance.    Pain scale prior to FNA :0  Pain scale after FNA :0

## 2017-04-03 ENCOUNTER — TELEPHONE (OUTPATIENT)
Dept: ENDOCRINOLOGY | Facility: CLINIC | Age: 67
End: 2017-04-03

## 2017-04-03 NOTE — TELEPHONE ENCOUNTER
Pt is requesting FNA results.  Please review and advise.  Thanks.  Pt is aware you are out til 4/5/17.

## 2017-04-03 NOTE — TELEPHONE ENCOUNTER
----- Message from Mirta Meraz sent at 4/3/2017 10:08 AM CDT -----  Contact: self  Patient wants to speak with a nurse regarding test results. Please call back at [phone] (hwpw)

## 2017-04-05 NOTE — TELEPHONE ENCOUNTER
Called Ms.Syeda Gonsalves and discussed with her about the results of her FNA   FNA x 2 unsatisfactory   Advise to get genetic testing  She will call [facility] [employer] will find out about out of pocket and let us know if would like to go for genetic testing  Other options also discussed surgery vs observation with repeat US in 6 months

## 2017-04-06 ENCOUNTER — TELEPHONE (OUTPATIENT)
Dept: ENDOCRINOLOGY | Facility: CLINIC | Age: 67
End: 2017-04-06

## 2017-04-06 NOTE — TELEPHONE ENCOUNTER
Pt states she spoke w/ [employer] and we will need to call [facility] ([phone]) for PA for genetic testing (CPT 90570 ThyrMIR and CPT 89686 ThyGenX).

## 2017-04-06 NOTE — TELEPHONE ENCOUNTER
Spoke w/ Ninoska from [employer].  Pt was given misinformation from the [employer] representative.  We should not have to call the insurance company.  The patient is to call the number provided for the [employer] to see if they qualify for assistance to cover the costs of the genetic thyroid testing.  The company will do the rest, including getting with the insurance company.  Pt is to call the [employer] number again and tell them she would like to know if she qualifies for assistance.  Pt verbalized understanding.

## 2017-04-06 NOTE — TELEPHONE ENCOUNTER
----- Message from Anna Cortez sent at 4/6/2017  8:42 AM CDT -----  Patient would like to discuss tests, and blood work that is scheduled for her on 4/17.   Patient would like to speak with the nurse, please call back [phone].   Thank you

## 2017-04-07 ENCOUNTER — TELEPHONE (OUTPATIENT)
Dept: ENDOCRINOLOGY | Facility: CLINIC | Age: 67
End: 2017-04-07

## 2017-04-07 NOTE — TELEPHONE ENCOUNTER
----- Message from Carmen Olsen sent at 4/7/2017  2:01 PM CDT -----  Returning your call.  Please call [phone].

## 2017-04-11 ENCOUNTER — TELEPHONE (OUTPATIENT)
Dept: ENDOCRINOLOGY | Facility: CLINIC | Age: 67
End: 2017-04-11

## 2017-04-11 NOTE — TELEPHONE ENCOUNTER
Pt informed we will cancel the appts for later this month per Carlo[facility].  Stated she rec'd a form from [employer] to complete so it's in the works to see if pt qualifies for assistance w/ the cost of the genetic testing.

## 2017-04-11 NOTE — TELEPHONE ENCOUNTER
----- Message from Vanesa Ferrell sent at 4/11/2017 10:41 AM CDT -----  Contact: patient  Patient calling in regards to questions about genetic testing that needs to be done and upcoming blood work. She wants to speak with a Nurse. Please advise.  Call back .  Thanks!

## 2017-04-13 ENCOUNTER — TELEPHONE (OUTPATIENT)
Dept: ENDOCRINOLOGY | Facility: CLINIC | Age: 67
End: 2017-04-13

## 2017-04-13 NOTE — TELEPHONE ENCOUNTER
----- Message from Stefany Autumn sent at 4/12/2017  2:41 PM CDT -----  Contact: Patient  Patient states that she had spoken with [employer] and it is covered by Medicare.  Can you please call the patient back at [phone].  Thank you

## 2017-04-13 NOTE — TELEPHONE ENCOUNTER
"Dr. Carty, pt informed of the options to repeat FNA for genetic testing, as slides cannot be used because of the specimens being "unsatisfactory" OR to proceed w/ surgery.  Pt would really like to speak w/ you.  Please call her when you can.   "

## 2017-04-17 ENCOUNTER — TELEPHONE (OUTPATIENT)
Dept: ENDOCRINOLOGY | Facility: CLINIC | Age: 67
End: 2017-04-17

## 2017-04-17 NOTE — TELEPHONE ENCOUNTER
Spoke to Ms.Syeda Gonsalves answered all questions   She would prefer to get repeat FNA with genetic testing before considering for surgery

## 2017-04-17 NOTE — TELEPHONE ENCOUNTER
----- Message from Irena Luque LPN sent at 4/17/2017 12:03 PM CDT -----  Contact: pt      ----- Message -----     From: Susan Murcia     Sent: 4/17/2017  11:55 AM       To: [facility] Hart Staff    Pt states keep missing call from , returning call  Call back on # [phone]  thanks

## 2017-04-28 ENCOUNTER — TELEPHONE (OUTPATIENT)
Dept: ENDOCRINOLOGY | Facility: CLINIC | Age: 67
End: 2017-04-28

## 2017-04-28 NOTE — TELEPHONE ENCOUNTER
----- Message from Cielo Khan sent at 4/28/2017  2:26 PM CDT -----  Contact: Pt  Pt would like the nurse to give her a call back.    Pt can be reached at [phone].    Thank you

## 2017-04-28 NOTE — TELEPHONE ENCOUNTER
Pt asking how many days prior to FNA should she stop ASA and NSAIDS?  Pt advised to stop them 3 days prior. Pt verbalized understanding.

## 2017-05-09 ENCOUNTER — TELEPHONE (OUTPATIENT)
Dept: ENDOCRINOLOGY | Facility: CLINIC | Age: 67
End: 2017-05-09

## 2017-05-09 NOTE — TELEPHONE ENCOUNTER
Pt confirmed coming in for 3:30 tomorrow for FNA  Confirmed no NSAIDS or ASA since prior to Sunday.  Pt verbalized understanding

## 2017-05-10 ENCOUNTER — OFFICE VISIT (OUTPATIENT)
Dept: ENDOCRINOLOGY | Facility: CLINIC | Age: 67
End: 2017-05-10
Payer: MEDICARE

## 2017-05-10 VITALS
DIASTOLIC BLOOD PRESSURE: 70 MMHG | SYSTOLIC BLOOD PRESSURE: 110 MMHG | HEIGHT: 62 IN | BODY MASS INDEX: 34.98 KG/M2 | WEIGHT: 190.06 LBS | HEART RATE: 68 BPM

## 2017-05-10 DIAGNOSIS — E04.2 MULTINODULAR GOITER: Primary | ICD-10-CM

## 2017-05-10 PROCEDURE — 88173 CYTOPATH EVAL FNA REPORT: CPT | Performed by: PATHOLOGY

## 2017-05-10 PROCEDURE — 99499 UNLISTED E&M SERVICE: CPT | Mod: S$GLB,,, | Performed by: INTERNAL MEDICINE

## 2017-05-10 PROCEDURE — 99999 PR PBB SHADOW E&M-EST. PATIENT-LVL II: CPT | Mod: PBBFAC,,, | Performed by: INTERNAL MEDICINE

## 2017-05-10 PROCEDURE — 10022 PR FINE NEEDLE ASP;W/IMAGING GUIDANCE: CPT | Mod: S$GLB,,, | Performed by: INTERNAL MEDICINE

## 2017-05-10 PROCEDURE — 76942 ECHO GUIDE FOR BIOPSY: CPT | Mod: S$GLB,,, | Performed by: INTERNAL MEDICINE

## 2017-05-10 NOTE — PROGRESS NOTES
Thyroid ultrasound for FNA    Indication:  thyroid nodule   or nontoxic multi-nodular goiter   - left    Procedure: Ultrasound guidance for fine needle aspiration biopsy  Procedure time out noted.    Real time ultrasound was performed in 2 planes using a [employer]  ultrasound machine.   The nodule was identified as described in report.  Following informed consent and sterile preparation, local anesthesia was achieved using  lidocaine spray. FNA guidance was performed by me using direct u/s guidance to confirm accurate needle placement.  Five aspirations were made using 27 gauge needles.  There was minimal blood loss.  Samples were submitted for cytology.  The patient tolerated the procedure well without complication.  After care instructions were provided.      Post operative diagnosis:   nontoxic multi-nodular goiter       Impression:  Uncomplicated FNA biopsy of thyroid nodule under U/S guidance.    Pain scale prior to FNA: 0  Pain scale after FNA ;1

## 2017-05-19 ENCOUNTER — TELEPHONE (OUTPATIENT)
Dept: ENDOCRINOLOGY | Facility: CLINIC | Age: 67
End: 2017-05-19

## 2017-05-19 NOTE — TELEPHONE ENCOUNTER
----- Message from Tila Davies sent at 5/18/2017  4:38 PM CDT -----  Contact: Patient  Patient called regarding test results. please call back at [phone]. Thanks,

## 2017-05-19 NOTE — TELEPHONE ENCOUNTER
Called pt regarding message about test results.  Pt wanted to know if we had the results from her thyroid biopsy.  I let her know that the specimen is still being processed and to check back with us next week.  Pt verbalized understanding.

## 2017-05-23 ENCOUNTER — TELEPHONE (OUTPATIENT)
Dept: ENDOCRINOLOGY | Facility: CLINIC | Age: 67
End: 2017-05-23

## 2017-05-23 NOTE — TELEPHONE ENCOUNTER
----- Message from Beth Sherman sent at 5/23/2017 10:17 AM CDT -----  Contact: self  Patient [phone] is returning call from this morning 05 23 17/please call

## 2017-05-23 NOTE — TELEPHONE ENCOUNTER
Spoke w/ pt, informed her FNA benign.  Pt wishes to proceed w/ genetic testing regardless.  Sample sent to [facility].

## 2017-05-23 NOTE — TELEPHONE ENCOUNTER
SILVIA to call the office regarding FNA results (benign).  Dr. Carty instructed me to inform pt of results once complete though she is out of the office.

## 2017-05-29 ENCOUNTER — DOCUMENTATION ONLY (OUTPATIENT)
Dept: FAMILY MEDICINE | Facility: CLINIC | Age: 67
End: 2017-05-29

## 2017-05-29 ENCOUNTER — OFFICE VISIT (OUTPATIENT)
Dept: FAMILY MEDICINE | Facility: CLINIC | Age: 67
End: 2017-05-29
Payer: MEDICARE

## 2017-05-29 VITALS
BODY MASS INDEX: 34.85 KG/M2 | RESPIRATION RATE: 16 BRPM | DIASTOLIC BLOOD PRESSURE: 82 MMHG | HEART RATE: 58 BPM | WEIGHT: 189.38 LBS | TEMPERATURE: 98 F | SYSTOLIC BLOOD PRESSURE: 131 MMHG | OXYGEN SATURATION: 98 % | HEIGHT: 62 IN

## 2017-05-29 DIAGNOSIS — K21.9 GASTROESOPHAGEAL REFLUX DISEASE, ESOPHAGITIS PRESENCE NOT SPECIFIED: ICD-10-CM

## 2017-05-29 DIAGNOSIS — Z12.11 COLON CANCER SCREENING: ICD-10-CM

## 2017-05-29 DIAGNOSIS — E78.5 HYPERLIPIDEMIA, UNSPECIFIED HYPERLIPIDEMIA TYPE: Primary | ICD-10-CM

## 2017-05-29 DIAGNOSIS — R63.5 WEIGHT GAIN: ICD-10-CM

## 2017-05-29 PROBLEM — F17.200 SMOKING: Status: RESOLVED | Noted: 2017-01-11 | Resolved: 2017-05-29

## 2017-05-29 PROCEDURE — 99213 OFFICE O/P EST LOW 20 MIN: CPT | Mod: S$GLB,,, | Performed by: INTERNAL MEDICINE

## 2017-05-29 PROCEDURE — 99499 UNLISTED E&M SERVICE: CPT | Mod: S$GLB,,, | Performed by: INTERNAL MEDICINE

## 2017-05-29 PROCEDURE — 1159F MED LIST DOCD IN RCRD: CPT | Mod: S$GLB,,, | Performed by: INTERNAL MEDICINE

## 2017-05-29 PROCEDURE — 1126F AMNT PAIN NOTED NONE PRSNT: CPT | Mod: S$GLB,,, | Performed by: INTERNAL MEDICINE

## 2017-05-29 NOTE — PROGRESS NOTES
Health Maintenance Due   Topic Date Due    Fecal Occult Blood Test (FOBT)/[employer]  12/31/1999

## 2017-05-29 NOTE — PATIENT INSTRUCTIONS
Red yeast rice.    Use Tylenol when possible instead of mobic.     Food diary for 3 days  Lose 5 pounds.  Suggest [place] diet

## 2017-06-06 ENCOUNTER — TELEPHONE (OUTPATIENT)
Dept: ENDOCRINOLOGY | Facility: CLINIC | Age: 67
End: 2017-06-06

## 2017-06-06 NOTE — TELEPHONE ENCOUNTER
Dr. Carty, pt is requesting to speak w/ you regarding your recommendation to see a surgeon.  She is going to work at 4 today so she would like you to call her tomorrow.  Thanks.

## 2017-06-06 NOTE — TELEPHONE ENCOUNTER
----- Message from Stephanie Daniels sent at 6/6/2017  2:57 PM CDT -----  Call [phone] asking to speak to nurse (no further information)

## 2017-06-07 ENCOUNTER — TELEPHONE (OUTPATIENT)
Dept: ENDOCRINOLOGY | Facility: CLINIC | Age: 67
End: 2017-06-07

## 2017-06-08 NOTE — TELEPHONE ENCOUNTER
I called Ms.Paula DALLIN Gonsalves   Discussed FNA benign   Waiting for genetic testing results   Will decide about surgery after that

## 2017-06-19 ENCOUNTER — TELEPHONE (OUTPATIENT)
Dept: ENDOCRINOLOGY | Facility: CLINIC | Age: 67
End: 2017-06-19

## 2017-06-19 ENCOUNTER — TELEPHONE (OUTPATIENT)
Dept: FAMILY MEDICINE | Facility: CLINIC | Age: 67
End: 2017-06-19

## 2017-06-19 NOTE — TELEPHONE ENCOUNTER
Genetic testing results shows likely benign nodule     Advise her to repeat US thyroid in a year and f/u with us in 1 year

## 2017-06-19 NOTE — TELEPHONE ENCOUNTER
Spoke w/ pt, advised her of Dr. Carty's message below.  She verbalized understanding.  Recall created.

## 2017-06-19 NOTE — TELEPHONE ENCOUNTER
----- Message from Vandana De La Cruz sent at 6/19/2017  3:08 PM CDT -----  Contact: self  States she is having diarrhea issues.  Please call back at [phone] (home)

## 2017-06-19 NOTE — TELEPHONE ENCOUNTER
----- Message from Irena Luque LPN sent at 6/19/2017  1:44 PM CDT -----  Contact: Syeda  The results are on your desk.   ----- Message -----  From: Beth Cortez  Sent: 6/19/2017   1:42 PM  To: [facility] Hart Staff    Patient is checking on result from genetic testing. Please call [phone]. Thanks!

## 2017-06-30 ENCOUNTER — TELEPHONE (OUTPATIENT)
Dept: FAMILY MEDICINE | Facility: CLINIC | Age: 67
End: 2017-06-30

## 2017-06-30 NOTE — TELEPHONE ENCOUNTER
Patient has appointment with Gastrology on Thursday but wants to know if mobic can cause diarrhea

## 2017-06-30 NOTE — TELEPHONE ENCOUNTER
----- Message from Bebe Murcia sent at 6/29/2017  3:46 PM CDT -----  Contact: [phone]  Patient is requesting a call back from the nurse stated its in reference to a chronic condition she's experiencing.    Please call the patient upon request at phone number [phone].

## 2017-07-03 ENCOUNTER — LAB VISIT (OUTPATIENT)
Dept: LAB | Facility: HOSPITAL | Age: 67
End: 2017-07-03
Attending: INTERNAL MEDICINE
Payer: MEDICARE

## 2017-07-03 ENCOUNTER — DOCUMENTATION ONLY (OUTPATIENT)
Dept: FAMILY MEDICINE | Facility: CLINIC | Age: 67
End: 2017-07-03

## 2017-07-03 ENCOUNTER — OFFICE VISIT (OUTPATIENT)
Dept: FAMILY MEDICINE | Facility: CLINIC | Age: 67
End: 2017-07-03
Payer: MEDICARE

## 2017-07-03 VITALS
TEMPERATURE: 98 F | SYSTOLIC BLOOD PRESSURE: 144 MMHG | OXYGEN SATURATION: 99 % | HEART RATE: 65 BPM | BODY MASS INDEX: 34.85 KG/M2 | DIASTOLIC BLOOD PRESSURE: 83 MMHG | HEIGHT: 62 IN | WEIGHT: 189.38 LBS | RESPIRATION RATE: 16 BRPM

## 2017-07-03 DIAGNOSIS — K52.9 CHRONIC DIARRHEA: Primary | ICD-10-CM

## 2017-07-03 DIAGNOSIS — K52.9 CHRONIC DIARRHEA: ICD-10-CM

## 2017-07-03 LAB — WBC #/AREA STL HPF: NORMAL /[HPF]

## 2017-07-03 PROCEDURE — 87427 SHIGA-LIKE TOXIN AG IA: CPT

## 2017-07-03 PROCEDURE — 89055 LEUKOCYTE ASSESSMENT FECAL: CPT

## 2017-07-03 PROCEDURE — 87449 NOS EACH ORGANISM AG IA: CPT

## 2017-07-03 PROCEDURE — 87046 STOOL CULTR AEROBIC BACT EA: CPT | Mod: 59

## 2017-07-03 PROCEDURE — 99214 OFFICE O/P EST MOD 30 MIN: CPT | Mod: S$GLB,,, | Performed by: INTERNAL MEDICINE

## 2017-07-03 PROCEDURE — 1125F AMNT PAIN NOTED PAIN PRSNT: CPT | Mod: S$GLB,,, | Performed by: INTERNAL MEDICINE

## 2017-07-03 PROCEDURE — 87045 FECES CULTURE AEROBIC BACT: CPT

## 2017-07-03 PROCEDURE — 1159F MED LIST DOCD IN RCRD: CPT | Mod: S$GLB,,, | Performed by: INTERNAL MEDICINE

## 2017-07-03 RX ORDER — CHOLESTYRAMINE 4 G/9G
POWDER, FOR SUSPENSION ORAL
Qty: 270 PACKET | Refills: 3 | Status: SHIPPED | OUTPATIENT
Start: 2017-07-03 | End: 2018-11-29 | Stop reason: SINTOL

## 2017-07-03 NOTE — PROGRESS NOTES
"Subjective:       Patient ID: Syeda Gonsalves is a 66 y.o. female.    Chief Complaint: Diarrhea (after eating mostly) and Abdominal Pain    HPI     CHIEF COMPLAINT: diarrhia  HPI: brat diet is helping.  Has not taken any Imodium.  She is taking Gatorade.  She is status post cholecystectomy.  Is also taking Prilosec    ONSET/TIMING: Trauma: no. . Onset   6 weeks    ago. Sudden: no.      DURATION: .  Intermittent     QUALITY/COURSE:    Improving.     LOCATION: Crampy pain left lower quadrant    INTENSITY/SEVERITY:  #   8   / 10 (on 1 to 10 scale).  Vomited:  0 x/day.  diarhia:  6 x/d.     MODIFIERS/TREATMENTS: . Eating worsens: no. . Drinking worsens: no.  Possible contaminated food: no.          The following symptoms/statements  are positive if BOLD, negative otherwise.     CONTEXT/WHEN: . Similar problems.  Exposure_to_others_with_similar_symptoms. ..  Recent_contaminated_food. .  Alcohol_ingestion.   Stopped_adrenal_steroids . . Antibiotics.  Travel    REVIEW OF SYMPTOMS:    . Fever(subjective) .  Vertigo .  Headache . Hematemesis .. Bloody_stools . Watery_stools . Loose_stools . Abdominal_pain . Chest_Pain . Dyspnea .  Urinary_Problems . Jaundice . dizziness .          Review of Systems    Objective:      Vitals:    07/03/17 1047   BP: (!) 144/83   Pulse: 65   Resp: 16   Temp: 98.2 °F (36.8 °C)   TempSrc: Oral   SpO2: 99%   Weight: 85.9 kg (189 lb 6 oz)   Height: 5' 2" (1.575 m)   PainSc:   8   PainLoc: Abdomen    blood pressure lying down 131/78 pulse 61  Blood pressure standing 131/80 pulse of 66  Physical Exam   Constitutional: She appears well-developed and well-nourished.   Eyes: Pupils are equal, round, and reactive to light.   Cardiovascular: Normal rate, regular rhythm and normal heart sounds.    Pulmonary/Chest: Effort normal and breath sounds normal.   Abdominal: Soft. There is no tenderness.   Neurological: She is alert.   Psychiatric: She has a normal mood and affect. Her behavior is normal. " Thought content normal.   Nursing note and vitals reviewed.        Assessment:       1. Chronic diarrhea          Plan:     Chronic diarrhea  -     ranitidine (ZANTAC) 150 MG tablet; Take 1 tablet (150 mg total) by mouth 2 (two) times daily.  Dispense: 60 tablet; Refill: 11  -     cholestyramine (QUESTRAN) 4 gram packet; Mix in water.  Take 3 times a day orally between meals  Dispense: 270 packet; Refill: 3  -     Clostridium difficile EIA; Future; Expected date: 07/03/2017  -     Stool culture; Future; Expected date: 07/03/2017  -     WBC, Stool; Future; Expected date: 07/03/2017  -     CBC auto differential; Future; Expected date: 07/03/2017  -     Comprehensive metabolic panel; Future; Expected date: 07/03/2017      Return in about 1 month (around 8/3/2017).

## 2017-07-05 LAB
E COLI SXT1 STL QL IA: NEGATIVE
E COLI SXT2 STL QL IA: NEGATIVE

## 2017-07-06 ENCOUNTER — INITIAL CONSULT (OUTPATIENT)
Dept: GASTROENTEROLOGY | Facility: CLINIC | Age: 67
End: 2017-07-06
Payer: MEDICARE

## 2017-07-06 VITALS
HEIGHT: 62 IN | WEIGHT: 190.69 LBS | RESPIRATION RATE: 20 BRPM | HEART RATE: 69 BPM | SYSTOLIC BLOOD PRESSURE: 131 MMHG | BODY MASS INDEX: 35.09 KG/M2 | DIASTOLIC BLOOD PRESSURE: 86 MMHG

## 2017-07-06 DIAGNOSIS — K63.5 POLYP OF COLON, UNSPECIFIED PART OF COLON, UNSPECIFIED TYPE: ICD-10-CM

## 2017-07-06 DIAGNOSIS — R19.7 WATERY DIARRHEA: Primary | ICD-10-CM

## 2017-07-06 PROCEDURE — 99203 OFFICE O/P NEW LOW 30 MIN: CPT | Mod: S$GLB,,, | Performed by: INTERNAL MEDICINE

## 2017-07-06 PROCEDURE — 1126F AMNT PAIN NOTED NONE PRSNT: CPT | Mod: S$GLB,,, | Performed by: INTERNAL MEDICINE

## 2017-07-06 PROCEDURE — 1159F MED LIST DOCD IN RCRD: CPT | Mod: S$GLB,,, | Performed by: INTERNAL MEDICINE

## 2017-07-06 PROCEDURE — 99999 PR PBB SHADOW E&M-EST. PATIENT-LVL III: CPT | Mod: PBBFAC,,, | Performed by: INTERNAL MEDICINE

## 2017-07-06 NOTE — LETTER
July 6, 2017      Rikki Gaitan MD  [address]  [place] [address]           [facility]  [address]  [place] [address]  Phone: [phone]          Patient: Syeda Gonsalves   MR Number: [id number]   YOB: 1950   Date of Visit: 7/6/2017       Dear Dr. Rikki Gaitan:    Thank you for referring Syeda Gonsalves to me for evaluation. Attached you will find relevant portions of my assessment and plan of care.    If you have questions, please do not hesitate to call me. I look forward to following Syeda Gonsalves along with you.    Sincerely,    Shailesh Betancourt MD    Enclosure  CC:  No Recipients    If you would like to receive this communication electronically, please contact [email or web address] or [phone] to request more information on [facility] [facility] access.    For providers and/or their staff who would like to refer a patient to [facility], please contact us through our one-stop-shop provider referral line, [facility] [place], at [phone].    If you feel you have received this communication in error or would no longer like to receive these types of communications, please e-mail [email or web address]

## 2017-07-06 NOTE — PROGRESS NOTES
"[facility] Note    CC: Diarrhea    HPI 66 y.o. female presents for evaluation of 4 weeks of moderate watery diarrhea associated with 3-4 BM's per day - which is improving.  She states that many of her co-workers had similar symptoms.  No nausea or abdominal pain.  She is due for a surveillance colonoscopy due to a h/o colon polyps.  She believes that her last colonoscopy was 10 years ago.    Past Medical History:   Diagnosis Date    Abnormal Pap smear 1990    Cervical cancer 1990    Had a complete hysterectomy    H/O head and neck radiation 1/11/2017    Hyperlipidemia     Multinodular goiter 1/11/2017    Smoking 1/11/2017         Review of Systems  General ROS: negative for - chills, fever or weight loss  Cardiovascular ROS: no chest pain or dyspnea on exertion  Gastrointestinal ROS: +diarrhea; no nausea or melena.    Physical Examination  /86   Pulse 69   Resp 20   Ht 5' 2" (1.575 m)   Wt 86.5 kg (190 lb 11.2 oz)   LMP 09/25/1990   BMI 34.88 kg/m²   General appearance: alert, cooperative, no distress  HENT: Normocephalic, atraumatic, neck symmetrical, no nasal discharge   Lungs: clear to auscultation bilaterally, symmetric chest wall expansion bilaterally  Heart: regular rate and rhythm without rub; no displacement of the PMI   Abdomen: soft, NT ND BS present; no organomegaly  Extremities: extremities symmetric; no clubbing, cyanosis, or edema  Neurologic: Alert and oriented X 3, normal strength, normal coordination and gait    Labs:  Stool WBC - negative    Assessment:   1. Diarrhea - resolving  2. History of colon polyps    Plan:  1. Schedule surveillance colonoscopy for h/o colon polyps  2. High fiber diet    Shailesh Betancourt MD  [facility]  [address], [address]  [place] [address]  Office: [phone]  Fax: [phone]    "

## 2017-07-07 LAB — BACTERIA STL CULT: NORMAL

## 2017-07-27 DIAGNOSIS — R19.7 DIARRHEA, UNSPECIFIED TYPE: Primary | ICD-10-CM

## 2017-07-31 ENCOUNTER — DOCUMENTATION ONLY (OUTPATIENT)
Dept: FAMILY MEDICINE | Facility: CLINIC | Age: 67
End: 2017-07-31

## 2017-08-08 ENCOUNTER — TELEPHONE (OUTPATIENT)
Dept: FAMILY MEDICINE | Facility: CLINIC | Age: 67
End: 2017-08-08

## 2017-08-08 NOTE — TELEPHONE ENCOUNTER
----- Message from Bebe Murcia sent at 8/7/2017 11:14 AM CDT -----  Contact: self [phone]  Patient is requesting a call back from the nurse, have concerns about a medication she taking.  Please call the patient upon request at phone number [phone].

## 2017-08-10 DIAGNOSIS — M54.2 NECK PAIN: ICD-10-CM

## 2017-08-10 RX ORDER — MELOXICAM 7.5 MG/1
7.5 TABLET ORAL DAILY
Qty: 90 TABLET | Refills: 1 | Status: SHIPPED | OUTPATIENT
Start: 2017-08-10 | End: 2017-10-02

## 2017-08-21 ENCOUNTER — CLINICAL SUPPORT (OUTPATIENT)
Dept: FAMILY MEDICINE | Facility: CLINIC | Age: 67
End: 2017-08-21
Payer: MEDICARE

## 2017-08-21 DIAGNOSIS — R63.5 WEIGHT GAIN: ICD-10-CM

## 2017-08-21 DIAGNOSIS — E78.5 HYPERLIPIDEMIA, UNSPECIFIED HYPERLIPIDEMIA TYPE: ICD-10-CM

## 2017-08-21 LAB
CHOLEST/HDLC SERPL: 4.6 {RATIO}
HDL/CHOLESTEROL RATIO: 21.8 %
HDLC SERPL-MCNC: 165 MG/DL
HDLC SERPL-MCNC: 36 MG/DL
LDLC SERPL CALC-MCNC: 95.6 MG/DL
NONHDLC SERPL-MCNC: 129 MG/DL
TRIGL SERPL-MCNC: 167 MG/DL
TSH SERPL DL<=0.005 MIU/L-ACNC: 1.7 UIU/ML

## 2017-08-21 PROCEDURE — 84443 ASSAY THYROID STIM HORMONE: CPT

## 2017-08-21 PROCEDURE — 80061 LIPID PANEL: CPT

## 2017-08-22 ENCOUNTER — TELEPHONE (OUTPATIENT)
Dept: FAMILY MEDICINE | Facility: CLINIC | Age: 67
End: 2017-08-22

## 2017-08-22 NOTE — TELEPHONE ENCOUNTER
----- Message from Tila Davies sent at 8/22/2017  1:57 PM CDT -----  Contact: patient  Patient requesting a call back regarding lab work.please call back at [phone]. Thanks,

## 2017-08-23 ENCOUNTER — TELEPHONE (OUTPATIENT)
Dept: FAMILY MEDICINE | Facility: CLINIC | Age: 67
End: 2017-08-23

## 2017-08-23 NOTE — TELEPHONE ENCOUNTER
----- Message from Artemio Lombardi sent at 8/22/2017  3:25 PM CDT -----  Contact: same  Unsuccessful call placed to office.  Patient called in and stated she missed a call back from nurse about her lab results.  Patient call back number is [phone]

## 2017-09-07 ENCOUNTER — OFFICE VISIT (OUTPATIENT)
Dept: FAMILY MEDICINE | Facility: CLINIC | Age: 67
End: 2017-09-07
Payer: MEDICARE

## 2017-09-07 VITALS
TEMPERATURE: 99 F | HEIGHT: 62 IN | SYSTOLIC BLOOD PRESSURE: 139 MMHG | BODY MASS INDEX: 35.01 KG/M2 | WEIGHT: 190.25 LBS | HEART RATE: 62 BPM | DIASTOLIC BLOOD PRESSURE: 78 MMHG | OXYGEN SATURATION: 98 % | RESPIRATION RATE: 16 BRPM

## 2017-09-07 DIAGNOSIS — E78.5 HYPERLIPIDEMIA, UNSPECIFIED HYPERLIPIDEMIA TYPE: Primary | ICD-10-CM

## 2017-09-07 DIAGNOSIS — E03.9 HYPOTHYROIDISM, UNSPECIFIED TYPE: ICD-10-CM

## 2017-09-07 PROCEDURE — 3008F BODY MASS INDEX DOCD: CPT | Mod: S$GLB,,, | Performed by: INTERNAL MEDICINE

## 2017-09-07 PROCEDURE — 99213 OFFICE O/P EST LOW 20 MIN: CPT | Mod: S$GLB,,, | Performed by: INTERNAL MEDICINE

## 2017-09-07 PROCEDURE — 1126F AMNT PAIN NOTED NONE PRSNT: CPT | Mod: S$GLB,,, | Performed by: INTERNAL MEDICINE

## 2017-09-07 PROCEDURE — 99499 UNLISTED E&M SERVICE: CPT | Mod: S$GLB,,, | Performed by: INTERNAL MEDICINE

## 2017-09-07 PROCEDURE — 1159F MED LIST DOCD IN RCRD: CPT | Mod: S$GLB,,, | Performed by: INTERNAL MEDICINE

## 2017-09-07 NOTE — PROGRESS NOTES
Subjective:       Patient ID: Syeda Gonsalves is a 66 y.o. female.    Chief Complaint: Hyperlipidemia (labs)    HPI   CHIEF :COMPLAINT: hypothyroidism    HPI:     ONSET:       Many years    ago.   DURATION: . continuous  QUALITY/COURSE: .  unchanged  INTENSITY/SEVERITY: # 0  /10 (on 1 to 10 scale)  MODIFIERS/TREATMENTS: Taking Medications: yes.     Prior Labs:   Lab Results   Component Value Date    TSH 1.702 08/21/2017     Thyroid scans:  no}  Ultrasound: .no      The below section is positive for the patient ONLY if BOLDED:      SYMPTOMS/RELATED:  Decrease in energy, diarrhea, constipation,  heat_intolerance,  Cold_intolerance, Nervousness, Palpitations. Hair_loss. Myalgias. Weight_loss, Weight_gain .              CHIEF COMPLAINT: Hyperlipidemia. cholesterol screening: no.   HPI:     ONSET:    MODIFIERS/TREATMENTS: . Taking medications: yes. . Non-compliance with following diet: no. .     SYMPTOMS/RELATED:Possible medication side effects include:   Myalgia: no.  .     REVIEW OF SYMPTOMS: past weights:   Wt Readings from Last 1 Encounters:   09/20/17 1315 85.8 kg (189 lb 2.5 oz)                                                     Last lipids: total   Lab Results   Component Value Date    CHOL 165 08/21/2017    CHOL 211 (H) 02/10/2017    CHOL 192 12/14/2015                                                                     HDL   Lab Results   Component Value Date    HDL 36 (L) 08/21/2017    HDL 40 02/10/2017    HDL 36 (L) 12/14/2015                                                                     LDL   Lab Results   Component Value Date    LDLCALC 95.6 08/21/2017    LDLCALC 137.8 02/10/2017    LDLCALC 118.4 12/14/2015                                                                     TRIG   Lab Results   Component Value Date    TRIG 167 (H) 08/21/2017    TRIG 166 (H) 02/10/2017    TRIG 188 (H) 12/14/2015             CHIEF :COMPLAINT: hypothyroidism                                                               "            Review of Systems    Objective:      Vitals:    09/07/17 1013   BP: 139/78   Pulse: 62   Resp: 16   Temp: 98.6 °F (37 °C)   TempSrc: Oral   SpO2: 98%   Weight: 86.3 kg (190 lb 4.1 oz)   Height: 5' 2" (1.575 m)   PainSc: 0-No pain     Physical Exam   Constitutional: She appears well-developed and well-nourished.   Eyes: Pupils are equal, round, and reactive to light.   Cardiovascular: Normal rate, regular rhythm and normal heart sounds.    Pulmonary/Chest: Effort normal and breath sounds normal.   Abdominal: Soft. There is no tenderness.   Neurological: She is alert.   Psychiatric: She has a normal mood and affect. Her behavior is normal. Thought content normal.   Nursing note and vitals reviewed.        Assessment:       1. Hyperlipidemia, unspecified hyperlipidemia type    2. Hypothyroidism, unspecified type          Plan:     Hyperlipidemia, unspecified hyperlipidemia type  -     Lipid panel; Future; Expected date: 09/07/2017    Hypothyroidism, unspecified type  -      Soft Tissue Head Neck Thyroid; Future; Expected date: 09/07/2017      Return in about 6 months (around 3/7/2018).      "

## 2017-09-20 ENCOUNTER — OFFICE VISIT (OUTPATIENT)
Dept: FAMILY MEDICINE | Facility: CLINIC | Age: 67
End: 2017-09-20
Payer: MEDICARE

## 2017-09-20 ENCOUNTER — DOCUMENTATION ONLY (OUTPATIENT)
Dept: FAMILY MEDICINE | Facility: CLINIC | Age: 67
End: 2017-09-20

## 2017-09-20 ENCOUNTER — TELEPHONE (OUTPATIENT)
Dept: GASTROENTEROLOGY | Facility: CLINIC | Age: 67
End: 2017-09-20

## 2017-09-20 VITALS
RESPIRATION RATE: 16 BRPM | TEMPERATURE: 99 F | OXYGEN SATURATION: 98 % | SYSTOLIC BLOOD PRESSURE: 126 MMHG | WEIGHT: 189.13 LBS | HEART RATE: 72 BPM | HEIGHT: 62 IN | BODY MASS INDEX: 34.8 KG/M2 | DIASTOLIC BLOOD PRESSURE: 80 MMHG

## 2017-09-20 DIAGNOSIS — J20.9 ACUTE BRONCHITIS, UNSPECIFIED ORGANISM: Primary | ICD-10-CM

## 2017-09-20 PROCEDURE — 3008F BODY MASS INDEX DOCD: CPT | Mod: S$GLB,,, | Performed by: INTERNAL MEDICINE

## 2017-09-20 PROCEDURE — 1159F MED LIST DOCD IN RCRD: CPT | Mod: S$GLB,,, | Performed by: INTERNAL MEDICINE

## 2017-09-20 PROCEDURE — 99213 OFFICE O/P EST LOW 20 MIN: CPT | Mod: S$GLB,,, | Performed by: INTERNAL MEDICINE

## 2017-09-20 PROCEDURE — 1125F AMNT PAIN NOTED PAIN PRSNT: CPT | Mod: S$GLB,,, | Performed by: INTERNAL MEDICINE

## 2017-09-20 RX ORDER — BENZONATATE 200 MG/1
200 CAPSULE ORAL 3 TIMES DAILY PRN
Qty: 30 CAPSULE | Refills: 1 | Status: SHIPPED | OUTPATIENT
Start: 2017-09-20 | End: 2017-09-30

## 2017-09-20 RX ORDER — PREDNISONE 20 MG/1
40 TABLET ORAL DAILY
Qty: 4 TABLET | Refills: 0 | Status: SHIPPED | OUTPATIENT
Start: 2017-09-20 | End: 2017-09-22

## 2017-09-20 NOTE — TELEPHONE ENCOUNTER
----- Message from Christina Del Cid sent at 9/20/2017 11:27 AM CDT -----  Please call patient in regards to rescheduling a colonoscopy [phone] (xopk)

## 2017-09-20 NOTE — PROGRESS NOTES
"Subjective:       Patient ID: Syeda Gonsalves is a 66 y.o. female.    Chief Complaint: Sinus Problem; Sore Throat; and Otalgia    HPI       CHIEF COMPLAINT: Cough(+).  HPI:     ONSET/TIMING: .  2 days   ago    DURATION:               Paroxysmal: no .    QUALITY/COURSE:. unchanged    INTENSITY/SEVERITY: Severity is # 6 (10 point scale)      The following symptoms/statements are positive if BOLD, negative otherwise.  I prefer with strep is better with stress      CONTEXT/WHEN:  Tobacco_use. Smokers_in_home. Seasonal_pattern. Allergies/Hayfever. Sinusitis. Irritant_Exposure(smoke/dust/fumes). Exposure_to_others_with_similar_symptoms.        Similar_problems_in_past.   PAST TREATMENT OR EVALUATION:   previous PPD. Recent_previous_chest_x-ray. Recent_antibiotics.  Associated Symptoms:     sputum production: scant. copious. Hemoptysis.  Medical History: Past_pulmonary_infections.  Cardiovascular_disease.chronic_lung_disease.  tuberculosis. Asthma. AIDS. Gastroesophageal_reflux_disease .      Review of Systems   Constitutional: Negative for chills, diaphoresis, fever and unexpected weight change.   HENT: Positive for sinus pressure and sore throat. Negative for rhinorrhea.    Respiratory: Positive for cough. Negative for shortness of breath and wheezing.    Cardiovascular: Negative for chest pain.   Musculoskeletal: Negative for myalgias.       Objective:      Vitals:    09/20/17 1315   BP: 126/80   Pulse: 72   Resp: 16   Temp: 99 °F (37.2 °C)   TempSrc: Oral   SpO2: 98%   Weight: 85.8 kg (189 lb 2.5 oz)   Height: 5' 2" (1.575 m)   PainSc:   4   PainLoc: Ear     Physical Exam   Constitutional: She appears well-developed and well-nourished.   HENT:   Right Ear: External ear normal.   Left Ear: External ear normal.   Mouth/Throat: Oropharynx is clear and moist. No oropharyngeal exudate.   Eyes: Pupils are equal, round, and reactive to light.   Cardiovascular: Normal rate, regular rhythm and normal heart sounds.  Exam " reveals no friction rub.    No murmur heard.  Pulmonary/Chest: Effort normal and breath sounds normal. No respiratory distress. She has no wheezes. She has no rales. She exhibits no tenderness.   Abdominal: Soft. Bowel sounds are normal. There is no tenderness.   Musculoskeletal: She exhibits no edema.   Lymphadenopathy:     She has no cervical adenopathy.   Neurological: She is alert.   Psychiatric: She has a normal mood and affect. Her behavior is normal. Thought content normal.   Nursing note and vitals reviewed.        Assessment:       1. Acute bronchitis, unspecified organism          Plan:     Acute bronchitis, unspecified organism  -     benzonatate (TESSALON) 200 MG capsule; Take 1 capsule (200 mg total) by mouth 3 (three) times daily as needed for Cough.  Dispense: 30 capsule; Refill: 1  -     predniSONE (DELTASONE) 20 MG tablet; Take 2 tablets (40 mg total) by mouth once daily.  Dispense: 4 tablet; Refill: 0      Return for if you are not better return in 2 weeks.

## 2017-09-20 NOTE — PROGRESS NOTES
"Subjective:       Patient ID: Syeda Gonsalves is a 66 y.o. female.    Chief Complaint: Sinus Problem; Sore Throat; and Otalgia    HPI         CHIEF COMPLAINT: Cough(+).  HPI:     ONSET/TIMING: .    2d  ago    DURATION:               Paroxysmal: no .    QUALITY/COURSE:. unchanged    INTENSITY/SEVERITY: Severity is #  6 (10 point scale)      The following symptoms/statements are positive if BOLD, negative otherwise.      CONTEXT/WHEN:  Tobacco_use. Smokers_in_home. Seasonal_pattern. Allergies/Hayfever. Sinusitis. Irritant_Exposure(smoke/dust/fumes). Exposure_to_others_with_similar_symptoms.        Similar_problems_in_past.   PAST TREATMENT OR EVALUATION:   previous PPD. Recent_previous_chest_x-ray. Recent_antibiotics.  Associated Symptoms:     sputum production: scant. copious. Hemoptysis.  Medical History: Past_pulmonary_infections.  Cardiovascular_disease.chronic_lung_disease.  tuberculosis. Asthma. AIDS. Gastroesophageal_reflux_disease .        Review of Systems   Constitutional: Negative for chills, diaphoresis, fever and unexpected weight change.   HENT: Positive for ear pain and sore throat. Negative for rhinorrhea and sinus pressure.    Respiratory: Positive for cough. Negative for shortness of breath and wheezing.    Cardiovascular: Negative for chest pain.   Musculoskeletal: Negative for myalgias.       Objective:      Vitals:    09/20/17 1315   BP: 126/80   Pulse: 72   Resp: 16   Temp: 99 °F (37.2 °C)   TempSrc: Oral   SpO2: 98%   Weight: 85.8 kg (189 lb 2.5 oz)   Height: 5' 2" (1.575 m)   PainSc:   4   PainLoc: Ear     Physical Exam   Constitutional: She appears well-developed and well-nourished.   HENT:   Right Ear: External ear normal.   Left Ear: External ear normal.   Mouth/Throat: Oropharynx is clear and moist. No oropharyngeal exudate.   Cardiovascular: Normal rate, regular rhythm and normal heart sounds.  Exam reveals no friction rub.    No murmur heard.  Pulmonary/Chest: Effort normal and breath " sounds normal. No respiratory distress. She has no wheezes. She has no rales. She exhibits no tenderness.   Abdominal: Soft. Bowel sounds are normal. There is no tenderness.   Musculoskeletal: She exhibits no edema.   Lymphadenopathy:     She has no cervical adenopathy.   Nursing note and vitals reviewed.        Assessment:       No diagnosis found.      Plan:     There are no diagnoses linked to this encounter.  No Follow-up on file.

## 2017-09-29 ENCOUNTER — NURSE TRIAGE (OUTPATIENT)
Dept: ADMINISTRATIVE | Facility: CLINIC | Age: 67
End: 2017-09-29

## 2017-09-30 NOTE — TELEPHONE ENCOUNTER
"  Reason for Disposition   Pain is mainly in upper abdomen  (if needed ask: "is it mainly above the belly button?")   [1] Abdominal pain is intermittent AND [2] shoots into chest, with sour taste in mouth    Answer Assessment - Initial Assessment Questions  1. LOCATION: "Where does it hurt?"       Upper mid abd  2. RADIATION: "Does the pain shoot anywhere else?" (e.g., chest, back)      Shoots to chest when belching  3. ONSET: "When did the pain begin?" (e.g., minutes, hours or days ago)       Several months  4. SUDDEN: "Gradual or sudden onset?"      sudden  5. PATTERN "Does the pain come and go, or is it constant?"     - If constant: "Is it getting better, staying the same, or worsening?"       (Note: Constant means the pain never goes away completely; most serious pain is constant and it progresses)      - If intermittent: "How long does it last?" "Do you have pain now?"      (Note: Intermittent means the pain goes away completely between bouts)      constant  6. SEVERITY: "How bad is the pain?"  (e.g., Scale 1-10; mild, moderate, or severe)    - MILD (1-3): doesn't interfere with normal activities, abdomen soft and not tender to touch     - MODERATE (4-7): interferes with normal activities or awakens from sleep, tender to touch     - SEVERE (8-10): excruciating pain, doubled over, unable to do any normal activities       9  7. RECURRENT SYMPTOM: "Have you ever had this type of abdominal pain before?" If so, ask: "When was the last time?" and "What happened that time?"       Yes  8. CAUSE: "What do you think is causing the abdominal pain?"  Unsure        9. RELIEVING/AGGRAVATING FACTORS: "What makes it better or worse?" (e.g., movement, antacids, bowel movement)      Takes ranitidine  10. OTHER SYMPTOMS: "Has there been any vomiting, diarrhea, constipation, or urine problems?"        no  11. PREGNANCY: "Is there any chance you are pregnant?" "When was your last menstrual period?"        no    Protocols used: ST " ABDOMINAL PAIN - FEMALE-A-AH, ST ABDOMINAL PAIN - UPPER-A-AH    Patient called reporting symptoms of acid reflux and nausea. No vomiting or diarrhea noted. Patient stated that this issue has been ongoing for months and she has previously been seen by her PCP and a gastroenterologist. Advised patient to try OTC medication and scheduled her an appt with her PCP for Monday, 10/2/17. Patient verbalized understanding of care advice.

## 2017-10-02 ENCOUNTER — OFFICE VISIT (OUTPATIENT)
Dept: FAMILY MEDICINE | Facility: CLINIC | Age: 67
End: 2017-10-02
Payer: MEDICARE

## 2017-10-02 ENCOUNTER — DOCUMENTATION ONLY (OUTPATIENT)
Dept: FAMILY MEDICINE | Facility: CLINIC | Age: 67
End: 2017-10-02

## 2017-10-02 VITALS
HEIGHT: 62 IN | WEIGHT: 190.94 LBS | HEART RATE: 60 BPM | DIASTOLIC BLOOD PRESSURE: 84 MMHG | BODY MASS INDEX: 35.14 KG/M2 | RESPIRATION RATE: 16 BRPM | SYSTOLIC BLOOD PRESSURE: 140 MMHG | OXYGEN SATURATION: 99 % | TEMPERATURE: 99 F

## 2017-10-02 DIAGNOSIS — R10.13 EPIGASTRIC PAIN: Primary | ICD-10-CM

## 2017-10-02 DIAGNOSIS — K92.1 HEMATOCHEZIA: ICD-10-CM

## 2017-10-02 PROCEDURE — 1125F AMNT PAIN NOTED PAIN PRSNT: CPT | Mod: S$GLB,,, | Performed by: INTERNAL MEDICINE

## 2017-10-02 PROCEDURE — 99214 OFFICE O/P EST MOD 30 MIN: CPT | Mod: S$GLB,,, | Performed by: INTERNAL MEDICINE

## 2017-10-02 PROCEDURE — 1159F MED LIST DOCD IN RCRD: CPT | Mod: S$GLB,,, | Performed by: INTERNAL MEDICINE

## 2017-10-02 PROCEDURE — 3008F BODY MASS INDEX DOCD: CPT | Mod: S$GLB,,, | Performed by: INTERNAL MEDICINE

## 2017-10-02 RX ORDER — PANTOPRAZOLE SODIUM 40 MG/1
40 TABLET, DELAYED RELEASE ORAL DAILY
Qty: 30 TABLET | Refills: 11 | Status: SHIPPED | OUTPATIENT
Start: 2017-10-02 | End: 2017-10-13

## 2017-10-02 NOTE — PROGRESS NOTES
Subjective:       Patient ID: Syeda Gonsalves is a 66 y.o. female.    Chief Complaint: Abdominal Pain (swelling,burning)    HPI       Abdominal Pain.    ONSET:     6 mo ago.    DURATION:  Intermittent for an hour to    QUALITY/COURSE: . worse    LOCATION:   epig  .--Radiation:  None    INTENSITY/SEVERITY: .Severity is #   7  (10 point scale).  Character:    CONTEXT/WHEN: .--Similar problems: no. Trauma: no.    The following symptoms/statements  are positive if BOLD, negative otherwise.    AGGRAVATING FACTORS: food within an hour.  . medications. alcohol. movement. position . bowel movements . emotional stress .  RELIEF WITH: food or milk, antacids . medications .  position . bowel movements . Eructation.  passing gas .  PAST TREATMENT OR EVALUATION: barium+_enema . Upper_gastrointestinal_series . CT_scans . sonograms . Endoscopic_procedures .  ASSOCIATED SYMPTOMS:      Weight_loss . jaundice .   FEMALE: relation_of_pain_to_menstrual_periods .      . Possibility_of_pregnancy . . dyspareunia .  HISTORY OF: Diabetes. CAD. prior abdomina surgery. Kidney_stones.  Gallbladder_disease removed. . Hiatal_hernia. Peptic_ulcer. colitis. Liver_disease.  FH  Colitis . . enteritis .     Last labs:  Lab Results   Component Value Date    WBC 8.20 05/16/2016    HGB 14.9 05/16/2016    HCT 44.9 05/16/2016     05/16/2016    CHOL 165 08/21/2017    TRIG 167 (H) 08/21/2017    HDL 36 (L) 08/21/2017    ALT 13 02/10/2017    AST 19 02/10/2017     02/10/2017    K 4.2 02/10/2017     02/10/2017    CREATININE 0.8 02/10/2017    BUN 12 02/10/2017    CO2 27 02/10/2017    TSH 1.702 08/21/2017                     Review of Systems   Constitutional: Negative for appetite change, fever and unexpected weight change.   Respiratory: Positive for chest tightness. Negative for cough and shortness of breath.    Gastrointestinal: Positive for abdominal distention, abdominal pain, blood in stool (recent), diarrhea (mild) and nausea.  "Negative for constipation and vomiting.   Genitourinary: Negative for dysuria, hematuria, vaginal bleeding and vaginal discharge.   Musculoskeletal: Negative for back pain.   Neurological: Positive for dizziness.   Psychiatric/Behavioral: Positive for sleep disturbance. The patient is nervous/anxious.        Objective:      Vitals:    10/02/17 1538 10/02/17 1616   BP: (!) 149/89 (!) 140/84   Pulse: 60    Resp: 16    Temp: 98.6 °F (37 °C)    TempSrc: Oral    SpO2: 99%    Weight: 86.6 kg (190 lb 14.7 oz)    Height: 5' 2" (1.575 m)    PainSc:   9    PainLoc: Abdomen      Physical Exam   Constitutional: She appears well-developed and well-nourished.   Eyes: Pupils are equal, round, and reactive to light.   Cardiovascular: Normal rate, regular rhythm and normal heart sounds.    Pulmonary/Chest: Effort normal and breath sounds normal.   Abdominal: Soft. There is tenderness (Epigastric).   Neurological: She is alert.   Psychiatric: She has a normal mood and affect. Her behavior is normal. Thought content normal.   Nursing note and vitals reviewed.        Assessment:       1. Epigastric pain    2. Hematochezia          Plan:     Epigastric pain  -     CBC auto differential; Future; Expected date: 10/02/2017  -     Comprehensive metabolic panel; Future; Expected date: 10/02/2017  -     Lipase; Future; Expected date: 10/02/2017  -     Ambulatory Referral to Gastroenterology  -     pantoprazole (PROTONIX) 40 MG tablet; Take 1 tablet (40 mg total) by mouth once daily.  Dispense: 30 tablet; Refill: 11    Hematochezia  -     Ambulatory Referral to Gastroenterology  -     POCT occult blood stool      Return in about 6 weeks (around 11/13/2017).      "

## 2017-10-02 NOTE — PATIENT INSTRUCTIONS
No anti-inflammatory drugs.  No alcohol or tobacco.  Avoid any foods that you know upset your stomach.    You can still take the Mylanta.  Stop the Zantac for 2 months    Let us know if you're still having problems in 4 days.

## 2017-10-10 ENCOUNTER — TELEPHONE (OUTPATIENT)
Dept: FAMILY MEDICINE | Facility: CLINIC | Age: 67
End: 2017-10-10

## 2017-10-10 NOTE — TELEPHONE ENCOUNTER
----- Message from Natalia Rivera sent at 10/9/2017  2:07 PM CDT -----  Contact: Self  Patient is making her follow up call to advise how she is doing on the Medication and patient also has a few questions regarding the medication as well.  Also, Patient is asking if she can Rx for a Brace for her right knee. Thanks! [phone], please call right back if first call does not go through.

## 2017-10-10 NOTE — TELEPHONE ENCOUNTER
What is the knee brace for?  It might be worthwhile to have Dr. Oliveira look at her and decide what brace to get her.  She will need to do that?

## 2017-10-10 NOTE — TELEPHONE ENCOUNTER
Spoke with patient.  Can she take Questran and Protonix?  No drug interactions found.  Wants knee brace.  Fwd to Dr Gaitan.

## 2017-10-11 DIAGNOSIS — R19.7 DIARRHEA, UNSPECIFIED TYPE: ICD-10-CM

## 2017-10-11 DIAGNOSIS — K58.9 IRRITABLE BOWEL SYNDROME, UNSPECIFIED TYPE: Primary | ICD-10-CM

## 2017-10-12 ENCOUNTER — TELEPHONE (OUTPATIENT)
Dept: FAMILY MEDICINE | Facility: CLINIC | Age: 67
End: 2017-10-12

## 2017-10-12 DIAGNOSIS — M25.50 ARTHRALGIA, UNSPECIFIED JOINT: Primary | ICD-10-CM

## 2017-10-12 RX ORDER — DICLOFENAC SODIUM 30 MG/G
GEL TOPICAL
Qty: 100 G | Refills: 1 | Status: SHIPPED | OUTPATIENT
Start: 2017-10-12 | End: 2018-09-17

## 2017-10-12 NOTE — TELEPHONE ENCOUNTER
----- Message from Beth Sherman sent at 10/11/2017  1:23 PM CDT -----  Contact: self  Patient [phone] is calling to check the status of having a prescription called to [facility] on [address] in [place] - for a cream that is rubbed on body for arthritis/please advise

## 2017-10-13 ENCOUNTER — HOSPITAL ENCOUNTER (OUTPATIENT)
Dept: RADIOLOGY | Facility: CLINIC | Age: 67
Discharge: HOME | End: 2017-10-13
Attending: INTERNAL MEDICINE
Payer: MEDICARE

## 2017-10-13 ENCOUNTER — NURSE TRIAGE (OUTPATIENT)
Dept: ADMINISTRATIVE | Facility: CLINIC | Age: 67
End: 2017-10-13

## 2017-10-13 ENCOUNTER — TELEPHONE (OUTPATIENT)
Dept: FAMILY MEDICINE | Facility: CLINIC | Age: 67
End: 2017-10-13

## 2017-10-13 ENCOUNTER — OFFICE VISIT (OUTPATIENT)
Dept: FAMILY MEDICINE | Facility: CLINIC | Age: 67
End: 2017-10-13
Payer: MEDICARE

## 2017-10-13 ENCOUNTER — DOCUMENTATION ONLY (OUTPATIENT)
Dept: FAMILY MEDICINE | Facility: CLINIC | Age: 67
End: 2017-10-13

## 2017-10-13 VITALS
OXYGEN SATURATION: 98 % | BODY MASS INDEX: 34.77 KG/M2 | TEMPERATURE: 99 F | HEIGHT: 62 IN | DIASTOLIC BLOOD PRESSURE: 83 MMHG | HEART RATE: 69 BPM | WEIGHT: 188.94 LBS | SYSTOLIC BLOOD PRESSURE: 134 MMHG

## 2017-10-13 DIAGNOSIS — M79.662 PAIN OF LEFT LOWER LEG: ICD-10-CM

## 2017-10-13 DIAGNOSIS — M25.561 ACUTE PAIN OF RIGHT KNEE: ICD-10-CM

## 2017-10-13 DIAGNOSIS — R11.0 NAUSEA: ICD-10-CM

## 2017-10-13 DIAGNOSIS — R10.13 EPIGASTRIC PAIN: ICD-10-CM

## 2017-10-13 DIAGNOSIS — M25.562 ACUTE PAIN OF LEFT KNEE: ICD-10-CM

## 2017-10-13 DIAGNOSIS — M70.62 GREATER TROCHANTERIC BURSITIS OF LEFT HIP: Primary | ICD-10-CM

## 2017-10-13 DIAGNOSIS — M79.661 PAIN OF RIGHT LOWER LEG: ICD-10-CM

## 2017-10-13 PROCEDURE — 20610 DRAIN/INJ JOINT/BURSA W/O US: CPT | Mod: LT,S$GLB,, | Performed by: INTERNAL MEDICINE

## 2017-10-13 PROCEDURE — 93971 EXTREMITY STUDY: CPT | Mod: TC,PO

## 2017-10-13 PROCEDURE — 99213 OFFICE O/P EST LOW 20 MIN: CPT | Mod: 25,S$GLB,, | Performed by: INTERNAL MEDICINE

## 2017-10-13 PROCEDURE — 93971 EXTREMITY STUDY: CPT | Mod: 26,,, | Performed by: RADIOLOGY

## 2017-10-13 RX ORDER — METHYLPREDNISOLONE ACETATE 40 MG/ML
40 INJECTION, SUSPENSION INTRA-ARTICULAR; INTRALESIONAL; INTRAMUSCULAR; SOFT TISSUE
Status: DISCONTINUED | OUTPATIENT
Start: 2017-10-13 | End: 2017-10-13 | Stop reason: HOSPADM

## 2017-10-13 RX ORDER — DICLOFENAC SODIUM 10 MG/G
2 GEL TOPICAL DAILY
Qty: 100 G | Refills: 2 | Status: SHIPPED | OUTPATIENT
Start: 2017-10-13 | End: 2018-03-07 | Stop reason: SDUPTHER

## 2017-10-13 RX ORDER — ONDANSETRON 4 MG/1
4 TABLET, FILM COATED ORAL EVERY 8 HOURS PRN
Qty: 20 TABLET | Refills: 1 | Status: SHIPPED | OUTPATIENT
Start: 2017-10-13 | End: 2018-05-28 | Stop reason: SDUPTHER

## 2017-10-13 RX ADMIN — METHYLPREDNISOLONE ACETATE 40 MG: 40 INJECTION, SUSPENSION INTRA-ARTICULAR; INTRALESIONAL; INTRAMUSCULAR; SOFT TISSUE at 02:10

## 2017-10-13 NOTE — TELEPHONE ENCOUNTER
"    Reason for Disposition   [1] SEVERE pain (e.g., excruciating, unable to walk) AND [2] not improved after 2 hours of pain medicine    Answer Assessment - Initial Assessment Questions  1. LOCATION: "Where is the swelling located?"  (e.g., left, right, both knees)      Behind right knee cap and ball of ankle   2. SIZE and DESCRIPTION: "What does the swelling look like?"  (e.g., entire knee, localized)      Entire knee   3. ONSET: "When did the swelling start?" "Does it come and go, or is it there all the time?"     Wednesday, constant   4. PAIN: "Is there any pain?" If so, ask: "How bad is it?" (Scale 1-10; or mild, moderate, severe)     Throbbing, 9  5. SETTING: "Has there been any recent work, exercise or other activity that involved that part of the body?"     Walks a lot at work but nothing knew   6. AGGRAVATING FACTORS: "What makes the knee swelling worse?" (e.g., walking, climbing stairs, running)       Walking   7. ASSOCIATED SYMPTOMS: "Is there any pain or redness?"      No   8. OTHER SYMPTOMS: "Do you have any other symptoms?" (e.g., chest pain, difficulty breathing, fever, calf pain)      Calf pain right leg   9. PREGNANCY: "Is there any chance you are pregnant?" "When was your last menstrual period?"      N/a    Protocols used: ST KNEE SWELLING-A-      Care advice given. apt made.   "

## 2017-10-13 NOTE — TELEPHONE ENCOUNTER
----- Message from Emily Rivera sent at 10/12/2017 12:58 PM CDT -----  Contact: [facility]  Want to speak with a nurse regarding clarification on diclofenac sodium please call back at [phone]     [facility] - [place] - [id number]  [address] [facility] [address]  Phone: [phone] Fax: [phone]

## 2017-10-13 NOTE — PROGRESS NOTES
"Subjective:       Patient ID: Syeda Gonsalves is a 66 y.o. female.    Chief Complaint: Spasms and Diarrhea    HPI       Lower_Extremity_Problems(+). Pain: yes. . Injury: no.   HPI:     ONSET/TIMING: . Onset   2 d   ago.     DURATION:   Intermittent         QUALITY/COURSE:    worse,. .     LOCATION:  r hip      . Radiation: foot.      INTENSITY/SEVERITY:. Severity is #   5   (10 point scale).        MODIFIERS/TREATMENTS: Current_Limitations_are:  none..   Taking medications: no    Physical_Therapy: no.  Chiropractor: no.     SYMPTOMS/RELATED: . --Possible medication side effects include:.     The following symptoms/statements  are positive if BOLD, negative otherwise.      CONTEXT/WHEN: . Activity . weight bearing. Inactivity.  Sudden  Work related.  Similar_problems_in past.   . Litigation_pending . X-rays. CT . MRI      REVIEW OF SYMPTOMS:   Numbness. Weakness. Muscle_Problems. Fever. Joint_Problems. Knee and hip Swelling. Erythema. Weight_loss. Instability.      .             Review of Systems    Objective:      Vitals:    10/13/17 1354   BP: 134/83   Pulse: 69   Temp: 98.6 °F (37 °C)   TempSrc: Oral   SpO2: 98%   Weight: 85.7 kg (188 lb 15 oz)   Height: 5' 2" (1.575 m)   PainSc:   8   PainLoc: Leg     Physical Exam   Constitutional: She appears well-developed and well-nourished.   Eyes: Pupils are equal, round, and reactive to light.   Cardiovascular: Normal rate, regular rhythm and normal heart sounds.    Pulmonary/Chest: Effort normal and breath sounds normal.   Abdominal: Soft. There is no tenderness.   Musculoskeletal:   Tender over the left greater trochanter.  Full range of motion of the left hip without pain.      Tender over a small cystic protrusion behind her left knee   Neurological: She is alert.   Psychiatric: She has a normal mood and affect. Her behavior is normal. Thought content normal.   Nursing note and vitals reviewed.        Assessment:       1. Greater trochanteric bursitis of left hip " (for procedure only)     2. Acute pain of left knee    3. Pain of left lower leg     4. Epigastric pain        ?  Baker's cyst  Plan:     Greater trochanteric bursitis of left hip  -     diclofenac sodium 1 % Gel; Apply 2 g topically once daily. Apply to left knee or hip as needed  Dispense: 100 g; Refill: 2  -     Large Joint Aspiration/Injection  -     methylPREDNISolone acetate injection 40 mg; Inject 40 mg into the articular space.    Acute pain of left knee  -     US Lower Extremity Veins Left; Future; Expected date: 10/13/2017  -     diclofenac sodium 1 % Gel; Apply 2 g topically once daily. Apply to left knee or hip as needed  Dispense: 100 g; Refill: 2    Pain of left lower leg   -     US Lower Extremity Veins Left; Future; Expected date: 10/13/2017    Epigastric pain  -     ranitidine (ACID CONTROL, RANITIDINE,) 150 MG tablet; Take 1 tablet (150 mg total) by mouth 2 (two) times daily.  Dispense: 60 tablet; Refill: 11    Nausea  -     ondansetron (ZOFRAN) 4 MG tablet; Take 1 tablet (4 mg total) by mouth every 8 (eight) hours as needed for Nausea.  Dispense: 20 tablet; Refill: 1      Return in about 6 weeks (around 11/24/2017).

## 2017-10-13 NOTE — PATIENT INSTRUCTIONS
The diarrhea should go away when he stopped the Protonix for a few days.  If not take imodium ad with each loose bowel movement

## 2017-10-17 ENCOUNTER — TELEPHONE (OUTPATIENT)
Dept: FAMILY MEDICINE | Facility: CLINIC | Age: 67
End: 2017-10-17

## 2017-10-17 DIAGNOSIS — M19.90 ARTHRITIS: Primary | ICD-10-CM

## 2017-10-17 RX ORDER — DICLOFENAC SODIUM 10 MG/G
2 GEL TOPICAL DAILY
Qty: 100 G | Refills: 3 | Status: SHIPPED | OUTPATIENT
Start: 2017-10-17 | End: 2022-11-22

## 2017-10-24 ENCOUNTER — TELEPHONE (OUTPATIENT)
Dept: FAMILY MEDICINE | Facility: CLINIC | Age: 67
End: 2017-10-24

## 2017-10-24 NOTE — TELEPHONE ENCOUNTER
----- Message from Vanesa Ferrell sent at 10/23/2017 11:07 AM CDT -----  Contact: patient  Patient calling to speak with the Nurse about a knee brace. Please advise.  Call back   Thanks!

## 2017-10-27 ENCOUNTER — TELEPHONE (OUTPATIENT)
Dept: FAMILY MEDICINE | Facility: CLINIC | Age: 67
End: 2017-10-27

## 2017-10-27 NOTE — TELEPHONE ENCOUNTER
----- Message from Kandice Chiang sent at 10/26/2017 12:01 PM CDT -----  Contact: pt  Pt states that she called the office 10/24 and the nurse was supposed to be talking to the Dr about her medication,pt hasn't heard anything would like a call back on the status..[phone] (home)

## 2017-11-01 ENCOUNTER — TELEPHONE (OUTPATIENT)
Dept: FAMILY MEDICINE | Facility: CLINIC | Age: 67
End: 2017-11-01

## 2017-11-01 NOTE — TELEPHONE ENCOUNTER
----- Message from Stephanie Abel sent at 11/1/2017 12:39 PM CDT -----  Contact: Patient  Syeda, patient [phone] calling to reschedule labs that are scheduled on 11/6/17, would like to come in 11/2/17, patient has death in family. Please advise. Thanks

## 2017-11-01 NOTE — TELEPHONE ENCOUNTER
Patient requested her labs be scheduled for tomorrow.There is a family member dying and I scheduled her labs.

## 2017-11-02 ENCOUNTER — CLINICAL SUPPORT (OUTPATIENT)
Dept: FAMILY MEDICINE | Facility: CLINIC | Age: 67
End: 2017-11-02
Payer: MEDICARE

## 2017-11-02 DIAGNOSIS — R10.13 EPIGASTRIC PAIN: ICD-10-CM

## 2017-11-02 LAB
ALBUMIN SERPL BCP-MCNC: 3.4 G/DL
ALP SERPL-CCNC: 68 U/L
ALT SERPL W/O P-5'-P-CCNC: 10 U/L
ANION GAP SERPL CALC-SCNC: 12 MMOL/L
AST SERPL-CCNC: 18 U/L
BASOPHILS # BLD AUTO: 0 K/UL
BASOPHILS NFR BLD: 0.6 %
BILIRUB SERPL-MCNC: 0.5 MG/DL
BUN SERPL-MCNC: 15 MG/DL
CALCIUM SERPL-MCNC: 9.5 MG/DL
CHLORIDE SERPL-SCNC: 105 MMOL/L
CO2 SERPL-SCNC: 25 MMOL/L
CREAT SERPL-MCNC: 0.8 MG/DL
DIFFERENTIAL METHOD: ABNORMAL
EOSINOPHIL # BLD AUTO: 0.2 K/UL
EOSINOPHIL NFR BLD: 2.2 %
ERYTHROCYTE [DISTWIDTH] IN BLOOD BY AUTOMATED COUNT: 12.8 %
EST. GFR  (AFRICAN AMERICAN): >60 ML/MIN/1.73 M^2
EST. GFR  (NON AFRICAN AMERICAN): >60 ML/MIN/1.73 M^2
GLUCOSE SERPL-MCNC: 96 MG/DL
HCT VFR BLD AUTO: 42.8 %
HGB BLD-MCNC: 14.5 G/DL
LIPASE SERPL-CCNC: 25 U/L
LYMPHOCYTES # BLD AUTO: 1.9 K/UL
LYMPHOCYTES NFR BLD: 25.1 %
MCH RBC QN AUTO: 29.7 PG
MCHC RBC AUTO-ENTMCNC: 34 G/DL
MCV RBC AUTO: 87 FL
MONOCYTES # BLD AUTO: 0.7 K/UL
MONOCYTES NFR BLD: 9.1 %
NEUTROPHILS # BLD AUTO: 4.9 K/UL
NEUTROPHILS NFR BLD: 63 %
PLATELET # BLD AUTO: 191 K/UL
PMV BLD AUTO: 8.8 FL
POTASSIUM SERPL-SCNC: 3.4 MMOL/L
PROT SERPL-MCNC: 7.1 G/DL
RBC # BLD AUTO: 4.9 M/UL
SODIUM SERPL-SCNC: 142 MMOL/L
WBC # BLD AUTO: 7.7 K/UL

## 2017-11-02 PROCEDURE — 83690 ASSAY OF LIPASE: CPT

## 2017-11-02 PROCEDURE — 80053 COMPREHEN METABOLIC PANEL: CPT

## 2017-11-02 PROCEDURE — 85025 COMPLETE CBC W/AUTO DIFF WBC: CPT

## 2017-11-20 ENCOUNTER — NURSE TRIAGE (OUTPATIENT)
Dept: ADMINISTRATIVE | Facility: CLINIC | Age: 67
End: 2017-11-20

## 2017-11-20 ENCOUNTER — OFFICE VISIT (OUTPATIENT)
Dept: FAMILY MEDICINE | Facility: CLINIC | Age: 67
End: 2017-11-20
Payer: MEDICARE

## 2017-11-20 ENCOUNTER — DOCUMENTATION ONLY (OUTPATIENT)
Dept: FAMILY MEDICINE | Facility: CLINIC | Age: 67
End: 2017-11-20

## 2017-11-20 VITALS
RESPIRATION RATE: 16 BRPM | HEART RATE: 74 BPM | BODY MASS INDEX: 35.22 KG/M2 | TEMPERATURE: 98 F | OXYGEN SATURATION: 98 % | SYSTOLIC BLOOD PRESSURE: 127 MMHG | HEIGHT: 62 IN | DIASTOLIC BLOOD PRESSURE: 76 MMHG | WEIGHT: 191.38 LBS

## 2017-11-20 DIAGNOSIS — E66.9 CLASS 2 OBESITY WITH BODY MASS INDEX (BMI) OF 35.0 TO 35.9 IN ADULT, UNSPECIFIED OBESITY TYPE, UNSPECIFIED WHETHER SERIOUS COMORBIDITY PRESENT: ICD-10-CM

## 2017-11-20 DIAGNOSIS — R10.13 EPIGASTRIC PAIN: Primary | ICD-10-CM

## 2017-11-20 DIAGNOSIS — G47.62 NOCTURNAL LEG CRAMPS: ICD-10-CM

## 2017-11-20 DIAGNOSIS — N18.2 CHRONIC KIDNEY DISEASE, STAGE II (MILD): ICD-10-CM

## 2017-11-20 DIAGNOSIS — E87.6 CHRONIC HYPOKALEMIA: ICD-10-CM

## 2017-11-20 PROCEDURE — 99499 UNLISTED E&M SERVICE: CPT | Mod: S$GLB,,, | Performed by: INTERNAL MEDICINE

## 2017-11-20 PROCEDURE — 99214 OFFICE O/P EST MOD 30 MIN: CPT | Mod: S$GLB,,, | Performed by: INTERNAL MEDICINE

## 2017-11-20 RX ORDER — POTASSIUM CHLORIDE 750 MG/1
10 TABLET, EXTENDED RELEASE ORAL ONCE
Qty: 1 TABLET | Refills: 0 | Status: SHIPPED | OUTPATIENT
Start: 2017-11-20 | End: 2017-11-20

## 2017-11-20 NOTE — TELEPHONE ENCOUNTER
Reason for Disposition   Health Information question, no triage required and triager able to answer question   [1] Caller requesting NON-URGENT health information AND [2] PCP's office is the best resource    Protocols used: ST INFORMATION ONLY CALL-A-OMID Landa called with questions concerning some of the terms used by Dr Gaitan in his AVS for her clinic visit today.  She wanted to know what co-morbidity means, and what chronic kidney disease II means.  Explained these terms to her (co-morbid = the presence of 2 or more chronic diseases or conditions), and chronic kidney disease stage 2  =  Early kidney disease.  Encouraged her to speak with Dr Gaitan for additional clarification of his report, and to follow closely with PCP for best possible outcome to improve and maintain health.  She will see the nutritionist as soon as IRENA calls her back to set up an appointment.  Message to Dr Gaitan. Please contact caller directly with any additional care advice.

## 2017-11-20 NOTE — PROGRESS NOTES
Subjective:       Patient ID: Syeda Gonsalves is a 66 y.o. female.    Chief Complaint: Follow-up and Abdominal Pain    HPI       Abdominal Pain.    ONSET:   2 mo  ago.    DURATION:      QUALITY/COURSE: . unchanged    LOCATION:   epig  .--Radiation:      INTENSITY/SEVERITY: .Severity is #   1   (10 point scale).  Character:    CONTEXT/WHEN: .--Similar problems: no. Trauma: no.    The following symptoms/statements  are positive if BOLD, negative otherwise.    AGGRAVATING FACTORS: food . medications. alcohol. movement. position . bowel movements . emotional stress .  RELIEF WITH: food or milk, antacids . medications .  Protonix  position . bowel movements . Eructation.  passing gas .  PAST TREATMENT OR EVALUATION: barium+_enema . Upper_gastrointestinal_series . CT_scans . sonograms . Endoscopic_procedures .  ASSOCIATED SYMPTOMS:      Weight_loss . jaundice .   FEMALE: relation_of_pain_to_menstrual_periods .      . Possibility_of_pregnancy . . dyspareunia .  HISTORY OF: Diabetes. CAD. prior abdomina surgery. Kidney_stones.  Gallbladder_disease. Hiatal_hernia. Peptic_ulcer. colitis. Liver_disease.  FH  Colitis . . enteritis .     Last labs:  Lab Results   Component Value Date    WBC 7.70 11/02/2017    HGB 14.5 11/02/2017    HCT 42.8 11/02/2017     11/02/2017    CHOL 165 08/21/2017    TRIG 167 (H) 08/21/2017    HDL 36 (L) 08/21/2017    ALT 10 11/02/2017    AST 18 11/02/2017     11/02/2017    K 3.4 (L) 11/02/2017     11/02/2017    CREATININE 0.8 11/02/2017    BUN 15 11/02/2017    CO2 25 11/02/2017    TSH 1.702 08/21/2017             All the patient's weight is been stable she is quite worried about her obesity and weight gain.     since starting on the Protonix she's been getting leg cramps at night about every other night.  5/10 severity    Review of Systems   Constitutional: Negative for appetite change, fever and unexpected weight change.   Respiratory: Negative for cough, chest tightness and  "shortness of breath.    Gastrointestinal: Positive for abdominal pain. Negative for blood in stool, constipation, diarrhea, nausea and vomiting.   Genitourinary: Negative for dysuria, hematuria, vaginal bleeding and vaginal discharge.   Musculoskeletal: Negative for back pain.       Objective:      Vitals:    11/20/17 1448   BP: 127/76   Pulse: 74   Resp: 16   Temp: 98 °F (36.7 °C)   TempSrc: Oral   SpO2: 98%   Weight: 86.8 kg (191 lb 5.8 oz)   Height: 5' 2" (1.575 m)   PainSc:   5   PainLoc: Back     Physical Exam   Constitutional: She appears well-developed and well-nourished.   Eyes: Pupils are equal, round, and reactive to light.   Cardiovascular: Normal rate, regular rhythm and normal heart sounds.    Pulmonary/Chest: Effort normal and breath sounds normal.   Abdominal: Soft. There is no tenderness.   Neurological: She is alert.   Psychiatric: She has a normal mood and affect. Her behavior is normal. Thought content normal.   Nursing note and vitals reviewed.        Assessment:       1. Epigastric pain    2. Class 2 obesity with body mass index (BMI) of 35.0 to 35.9 in adult, unspecified obesity type, unspecified whether serious comorbidity present    3. Chronic hypokalemia    4. Nocturnal leg cramps    5. Chronic kidney disease, stage II (mild)           Plan:     Epigastric pain    Class 2 obesity with body mass index (BMI) of 35.0 to 35.9 in adult, unspecified obesity type, unspecified whether serious comorbidity present  -     Ambulatory referral to Nutrition Services    Chronic hypokalemia    Nocturnal leg cramps  -     potassium chloride (KLOR-CON) 10 MEQ TbSR; Take 1 tablet (10 mEq total) by mouth once.  Dispense: 1 tablet; Refill: 0    Chronic kidney disease, stage II (mild)   -     Ambulatory referral to Nutrition Services      Return in about 6 weeks (around 1/1/2018).      "

## 2017-11-21 ENCOUNTER — TELEPHONE (OUTPATIENT)
Dept: FAMILY MEDICINE | Facility: CLINIC | Age: 67
End: 2017-11-21

## 2017-11-21 NOTE — TELEPHONE ENCOUNTER
----- Message from Maty Costello sent at 11/20/2017  4:02 PM CST -----  Contact: [facility]   [facility] is needing clarification on medication potassium chloride (KLOR-CON) 10 MEQ TbSR for patient     Please call back [phone]

## 2017-11-29 ENCOUNTER — TELEPHONE (OUTPATIENT)
Dept: FAMILY MEDICINE | Facility: CLINIC | Age: 67
End: 2017-11-29

## 2017-11-29 NOTE — TELEPHONE ENCOUNTER
----- Message from Vandana De La Cruz sent at 11/29/2017  3:27 PM CST -----  Contact: self  States one of her meds needed a prior auth but Dr Gaitan never got got with the pharmacy, not sure of the name. Please call back at [phone] (home)     [facility] [address] [place] [address] - [address] [address]  [address] [facility] [address]  Phone: [phone] Fax: [phone]

## 2017-12-13 ENCOUNTER — CLINICAL SUPPORT (OUTPATIENT)
Dept: DIABETES | Facility: CLINIC | Age: 67
End: 2017-12-13
Payer: MEDICARE

## 2017-12-13 DIAGNOSIS — E66.9 CLASS 2 OBESITY WITH BODY MASS INDEX (BMI) OF 35.0 TO 35.9 IN ADULT, UNSPECIFIED OBESITY TYPE, UNSPECIFIED WHETHER SERIOUS COMORBIDITY PRESENT: ICD-10-CM

## 2017-12-13 DIAGNOSIS — N18.2 CHRONIC KIDNEY DISEASE, STAGE II (MILD): ICD-10-CM

## 2017-12-13 PROCEDURE — G0108 DIAB MANAGE TRN  PER INDIV: HCPCS | Mod: S$GLB,,, | Performed by: DIETITIAN, REGISTERED

## 2017-12-13 PROCEDURE — 99999 PR PBB SHADOW E&M-EST. PATIENT-LVL II: CPT | Mod: PBBFAC,,,

## 2017-12-15 VITALS — HEIGHT: 62 IN | BODY MASS INDEX: 35.22 KG/M2 | WEIGHT: 191.38 LBS

## 2017-12-15 NOTE — PROGRESS NOTES
"Nutrition Assessment for Medical Nutrition Therapy    Referring professional: Dr. Gaitan    Reason for MNT visit: Pt in for education and nutrition counseling regarding irritable bowel syndrome and obesity.     Ht: 5' 2" (1.575 m)     Wt: 86.8 kg (191 lb 5.8 oz)    BMI: Body mass index is 35 kg/m².    Estimated energy requirements: 1200 Kcal and  g,prot,d      Nutrition History   For breakfast she has toast with a banana smoothie or a fruit bar.  Usually she skips lunch but will eat when she goes to [email or web address] on [address].  Dinner last night was a salad    Weight status: consistently increasing    Meal patterns: 1-2 meals daily and 1-2 snacks daily    Carbohydrate: large intake of starches, inconsistent    Protein: inadequate,  lean sources and high-fat sources    Fruit: increased volume, drinking juices    Vegetables: needs to increase volume    Meal preparation/shopping: self    Nutrition Na: No significant sources revealed    Nutrition beverages: mainly drinks water    Dining out: Infrequent, 3-4 times per week    Smoking/alcohol: nonsmoker, no alcohol    Motivation to change: moderate    MNT Recommendations:    · 30 grams carbohydrates per meal  · non-starchy vegetables at snacks  · 7-8 oz protein daily  · Increase intake of fruits and vegetables  · Low fat diet  · Low cholesterol diet  · Increase activity  · Eat 3 meals daily  · Limit snacking    Plan:  · Reviewed  carbohydrate foods, portions and goals per meal and snack, food label strategies, low-fat guidelines, low-sodium guidelines, pathophysiology of diabetes, long-term complications, treatment and health prevention strategies, principles of energy metabolism with strategies to assist weight management, protein foods and portion goals  · Discussed  exchange lists for weight management, importance of physical activity, physical activity benefits, methods and precautions  · Provided  dining out strategies, food preparation strategies, grocery shopping " guidelines, meal plan and meal planning strategies, non-calorie containing beverage choices, recipes, snack suggestions, strategies for increasing fruits and vegetables at meals and snacks    Behavior goals: portion control, weight loss, label reading    Nutrition follow-up: will follow up as needed    Comprehension: good     Counseling time: 1 Hour    Thanks you for the opportunity to participate in the care of the patient. Please feel free to call or e-mail with questions or concerns.    Adrienne Levy MPH RD LDN CDE  Clinical Dietitian-[place] [facility]  [phone]  [email or web address]

## 2017-12-20 ENCOUNTER — NURSE TRIAGE (OUTPATIENT)
Dept: ADMINISTRATIVE | Facility: CLINIC | Age: 67
End: 2017-12-20

## 2017-12-20 ENCOUNTER — TELEPHONE (OUTPATIENT)
Dept: GASTROENTEROLOGY | Facility: CLINIC | Age: 67
End: 2017-12-20

## 2017-12-20 NOTE — TELEPHONE ENCOUNTER
----- Message from Stefany Leyva sent at 12/20/2017 12:18 PM CST -----  Contact: patient  Patient states that she would like to talk to you about the procedure that is scheduled for tomorrow, 12/21/2017 and to please call her back at [phone].  Thank you

## 2017-12-21 ENCOUNTER — ANESTHESIA (OUTPATIENT)
Dept: ENDOSCOPY | Facility: HOSPITAL | Age: 67
End: 2017-12-21
Payer: MEDICARE

## 2017-12-21 ENCOUNTER — HOSPITAL ENCOUNTER (OUTPATIENT)
Facility: HOSPITAL | Age: 67
Discharge: HOME | End: 2017-12-21
Attending: INTERNAL MEDICINE | Admitting: INTERNAL MEDICINE
Payer: MEDICARE

## 2017-12-21 ENCOUNTER — SURGERY (OUTPATIENT)
Age: 67
End: 2017-12-21

## 2017-12-21 ENCOUNTER — ANESTHESIA EVENT (OUTPATIENT)
Dept: ENDOSCOPY | Facility: HOSPITAL | Age: 67
End: 2017-12-21
Payer: MEDICARE

## 2017-12-21 DIAGNOSIS — R19.4 CHANGE IN BOWEL HABITS: ICD-10-CM

## 2017-12-21 DIAGNOSIS — K63.5 POLYP OF COLON, UNSPECIFIED PART OF COLON, UNSPECIFIED TYPE: Primary | ICD-10-CM

## 2017-12-21 PROCEDURE — 25000003 PHARM REV CODE 250: Performed by: NURSE ANESTHETIST, CERTIFIED REGISTERED

## 2017-12-21 PROCEDURE — 45385 COLONOSCOPY W/LESION REMOVAL: CPT | Mod: PT,,, | Performed by: INTERNAL MEDICINE

## 2017-12-21 PROCEDURE — 45385 COLONOSCOPY W/LESION REMOVAL: CPT | Performed by: INTERNAL MEDICINE

## 2017-12-21 PROCEDURE — 63600175 PHARM REV CODE 636 W HCPCS: Performed by: NURSE ANESTHETIST, CERTIFIED REGISTERED

## 2017-12-21 PROCEDURE — 27201028 HC NEEDLE, SCLERO: Performed by: INTERNAL MEDICINE

## 2017-12-21 PROCEDURE — 45381 COLONOSCOPY SUBMUCOUS NJX: CPT | Performed by: INTERNAL MEDICINE

## 2017-12-21 PROCEDURE — 37000009 HC ANESTHESIA EA ADD 15 MINS: Performed by: INTERNAL MEDICINE

## 2017-12-21 PROCEDURE — 45381 COLONOSCOPY SUBMUCOUS NJX: CPT | Mod: 51,,, | Performed by: INTERNAL MEDICINE

## 2017-12-21 PROCEDURE — D9220A PRA ANESTHESIA: Mod: 33,CRNA,, | Performed by: NURSE ANESTHETIST, CERTIFIED REGISTERED

## 2017-12-21 PROCEDURE — 37000008 HC ANESTHESIA 1ST 15 MINUTES: Performed by: INTERNAL MEDICINE

## 2017-12-21 PROCEDURE — D9220A PRA ANESTHESIA: Mod: 33,ANES,, | Performed by: ANESTHESIOLOGY

## 2017-12-21 PROCEDURE — 88305 TISSUE EXAM BY PATHOLOGIST: CPT | Performed by: PATHOLOGY

## 2017-12-21 PROCEDURE — 27201089 HC SNARE, DISP (ANY): Performed by: INTERNAL MEDICINE

## 2017-12-21 PROCEDURE — 25000003 PHARM REV CODE 250: Performed by: INTERNAL MEDICINE

## 2017-12-21 RX ORDER — SODIUM CHLORIDE 9 MG/ML
INJECTION, SOLUTION INTRAVENOUS CONTINUOUS
Status: DISCONTINUED | OUTPATIENT
Start: 2017-12-21 | End: 2017-12-21 | Stop reason: HOSPADM

## 2017-12-21 RX ORDER — LIDOCAINE HYDROCHLORIDE 10 MG/ML
INJECTION, SOLUTION EPIDURAL; INFILTRATION; INTRACAUDAL; PERINEURAL
Status: DISCONTINUED | OUTPATIENT
Start: 2017-12-21 | End: 2017-12-21

## 2017-12-21 RX ORDER — PROPOFOL 10 MG/ML
VIAL (ML) INTRAVENOUS
Status: DISCONTINUED | OUTPATIENT
Start: 2017-12-21 | End: 2017-12-21

## 2017-12-21 RX ORDER — PROPOFOL 10 MG/ML
INJECTION, EMULSION INTRAVENOUS
Status: COMPLETED
Start: 2017-12-21 | End: 2017-12-21

## 2017-12-21 RX ORDER — LIDOCAINE HYDROCHLORIDE 10 MG/ML
INJECTION, SOLUTION EPIDURAL; INFILTRATION; INTRACAUDAL; PERINEURAL
Status: COMPLETED
Start: 2017-12-21 | End: 2017-12-21

## 2017-12-21 RX ORDER — PANTOPRAZOLE SODIUM 40 MG/1
40 TABLET, DELAYED RELEASE ORAL DAILY
COMMUNITY
End: 2018-09-17 | Stop reason: SINTOL

## 2017-12-21 RX ADMIN — SODIUM CHLORIDE: 0.9 INJECTION, SOLUTION INTRAVENOUS at 11:12

## 2017-12-21 RX ADMIN — PROPOFOL 30 MG: 10 INJECTION, EMULSION INTRAVENOUS at 12:12

## 2017-12-21 RX ADMIN — LIDOCAINE HYDROCHLORIDE 100 MG: 10 INJECTION, SOLUTION EPIDURAL; INFILTRATION; INTRACAUDAL; PERINEURAL at 11:12

## 2017-12-21 RX ADMIN — PROPOFOL 20 MG: 10 INJECTION, EMULSION INTRAVENOUS at 12:12

## 2017-12-21 RX ADMIN — PROPOFOL 100 MG: 10 INJECTION, EMULSION INTRAVENOUS at 11:12

## 2017-12-21 RX ADMIN — PROPOFOL 40 MG: 10 INJECTION, EMULSION INTRAVENOUS at 12:12

## 2017-12-21 NOTE — TRANSFER OF CARE
"Anesthesia Transfer of Care Note    Patient: Syeda Gonsalves    Procedure(s) Performed: Procedure(s) (LRB):  COLONOSCOPY (N/A)    Patient location: PACU    Anesthesia Type: general    Transport from OR: Transported from OR on room air with adequate spontaneous ventilation    Post pain: adequate analgesia    Post assessment: no apparent anesthetic complications    Post vital signs: stable    Level of consciousness: awake    Nausea/Vomiting: no nausea/vomiting    Complications: none    Transfer of care protocol was followed      Last vitals:   Visit Vitals  BP (!) 128/59 (BP Location: Left arm, Patient Position: Lying)   Pulse 63   Temp 36.6 °C (97.9 °F) (Temporal)   Resp 16   Ht 5' 2" (1.575 m)   Wt 86.2 kg (190 lb)   LMP 09/25/1990   SpO2 99%   Breastfeeding? No   BMI 34.75 kg/m²     "

## 2017-12-21 NOTE — ANESTHESIA PREPROCEDURE EVALUATION
12/21/2017  Syeda Gonsalves is a 66 y.o., female.    Anesthesia Evaluation    I have reviewed the Patient Summary Reports.    I have reviewed the Nursing Notes.   I have reviewed the Medications.     Review of Systems  Anesthesia Hx:  Denies Family Hx of Anesthesia complications.   Denies Personal Hx of Anesthesia complications.   Cardiovascular:  Cardiovascular Normal     Pulmonary:   Asthma mild    Renal/:   Chronic Renal Disease, CRI    Hepatic/GI:   GERD    Neurological:  Neurology Normal    Endocrine:  Endocrine Normal        Physical Exam  General:  Obesity    Airway/Jaw/Neck:  Airway Findings: Mouth Opening: Normal Tongue: Normal  General Airway Assessment: Adult  Mallampati: II  TM Distance: Normal, at least 6 cm  Jaw/Neck Findings:  Neck ROM: Normal ROM      Dental:  Dental Findings: In tact   Chest/Lungs:  Chest/Lungs Clear    Heart/Vascular:  Heart Findings: Normal            Anesthesia Plan  Type of Anesthesia, risks & benefits discussed:  Anesthesia Type:  general  Patient's Preference:   Intra-op Monitoring Plan: standard ASA monitors  Intra-op Monitoring Plan Comments:   Post Op Pain Control Plan:   Post Op Pain Control Plan Comments:   Induction:   IV  Beta Blocker:  Patient is not currently on a Beta-Blocker (No further documentation required).       Informed Consent: Patient understands risks and agrees with Anesthesia plan.  Questions answered. Anesthesia consent signed with patient.  ASA Score: 2     Day of Surgery Review of History & Physical:    H&P update referred to the provider.         Ready For Surgery From Anesthesia Perspective.

## 2017-12-21 NOTE — TELEPHONE ENCOUNTER
"    Reason for Disposition   Caller has medication question only, adult not sick, and triager answers question    Answer Assessment - Initial Assessment Questions  1. SYMPTOMS: "Do you have any symptoms?"      Prep was not working for an 2 hour period nut now it is  2. SEVERITY: If symptoms are present, ask "Are they mild, moderate or severe?"      No questions at this time. Prep is working    Protocols used: ST MEDICATION QUESTION CALL-A-      "

## 2017-12-21 NOTE — ANESTHESIA POSTPROCEDURE EVALUATION
"Anesthesia Post Evaluation    Patient: Syeda Gonsalves    Procedure(s) Performed: Procedure(s) (LRB):  COLONOSCOPY (N/A)    Final Anesthesia Type: general  Patient location during evaluation: PACU  Patient participation: Yes- Able to Participate  Level of consciousness: awake and alert  Post-procedure vital signs: reviewed and stable  Pain management: adequate  Airway patency: patent  PONV status at discharge: No PONV  Anesthetic complications: no      Cardiovascular status: blood pressure returned to baseline  Respiratory status: unassisted  Hydration status: euvolemic  Follow-up not needed.        Visit Vitals  /63   Pulse 66   Temp 36.9 °C (98.4 °F)   Resp 18   Ht 5' 2" (1.575 m)   Wt 86.2 kg (190 lb)   LMP 09/25/1990   SpO2 100%   Breastfeeding? No   BMI 34.75 kg/m²       Pain/Benita Score: Pain Assessment Performed: Yes (12/21/2017 12:45 PM)  Presence of Pain: denies (12/21/2017 12:45 PM)  Benita Score: 10 (12/21/2017 12:45 PM)      "

## 2017-12-21 NOTE — H&P
[facility]    CC: History of colon polyps, diarrhea    HPI 66 y.o. female with personal history of colon polyps presents with diarrhea    Past Medical History:   Diagnosis Date    Abnormal Pap smear 1990    Cervical cancer 1990    Had a complete hysterectomy    H/O head and neck radiation 1/11/2017    Hyperlipidemia     Multinodular goiter 1/11/2017    Smoking 1/11/2017         Review of Systems  General ROS: negative for - chills, fever or weight loss  Cardiovascular ROS: no chest pain or dyspnea on exertion  Gastrointestinal ROS: + diarrhea, no blood in stool, no hematemesis    Physical Examination  LMP 09/25/1990   Breastfeeding? No   General appearance: alert, cooperative, no distress  HENT: Normocephalic, atraumatic, neck symmetrical, no nasal discharge, sclera anicteric   Lungs: clear to auscultation bilaterally, symmetric chest wall expansion bilaterally  Heart: regular rate and rhythm without rub; no displacement of the PMI   Abdomen: soft, nontender, nondistended  Extremities: extremities symmetric; no clubbing, cyanosis, or edema    Labs:  Lab Results   Component Value Date    WBC 7.70 11/02/2017    HGB 14.5 11/02/2017    HCT 42.8 11/02/2017    MCV 87 11/02/2017     11/02/2017       Assessment:   66 y.o. female with diarrhea and personal history of colon polyps presents for colonoscopy    Plan:  -Proceed to colonoscopy    MD [facility]  [address], [address]  [place] [address]  Office: [phone]  Fax: [phone]

## 2017-12-22 VITALS
RESPIRATION RATE: 18 BRPM | BODY MASS INDEX: 34.96 KG/M2 | HEIGHT: 62 IN | WEIGHT: 190 LBS | TEMPERATURE: 98 F | HEART RATE: 66 BPM | SYSTOLIC BLOOD PRESSURE: 123 MMHG | DIASTOLIC BLOOD PRESSURE: 63 MMHG | OXYGEN SATURATION: 100 %

## 2018-01-03 ENCOUNTER — NURSE TRIAGE (OUTPATIENT)
Dept: ADMINISTRATIVE | Facility: CLINIC | Age: 68
End: 2018-01-03

## 2018-01-03 NOTE — TELEPHONE ENCOUNTER
"    Reason for Disposition   [1] Caller requesting NON-URGENT health information AND [2] PCP's office is the best resource    Answer Assessment - Initial Assessment Questions  1. REASON FOR CALL or QUESTION: "What is your reason for calling today?" or "How can I best help you?" or "What question do you have that I can help answer?"      Patient is experiencing itching around abdomen. She states she had the shingles vaccine, but heard reports that there is a new vaccine. Patient would like to know if she needs to receive another shingles vaccine.    Protocols used: ST INFORMATION ONLY CALL-A-AH      "

## 2018-01-03 NOTE — TELEPHONE ENCOUNTER
We do not give this prescription at the clinic.  She can call her pharmacy.  Usually they do not need a prescription to receive the vaccination and can be given on site at the pharmacy

## 2018-01-05 ENCOUNTER — TELEPHONE (OUTPATIENT)
Dept: FAMILY MEDICINE | Facility: CLINIC | Age: 68
End: 2018-01-05

## 2018-01-05 NOTE — TELEPHONE ENCOUNTER
----- Message from Maty Costello sent at 1/5/2018  8:42 AM CST -----  Contact: Self  Patient states she sent a message 2 days ago to have the nurse call her back so she could back a medical question and has not received a call back     Please call back [phone]

## 2018-02-28 ENCOUNTER — HOSPITAL ENCOUNTER (OUTPATIENT)
Dept: RADIOLOGY | Facility: CLINIC | Age: 68
Discharge: HOME | End: 2018-02-28
Attending: INTERNAL MEDICINE
Payer: MEDICARE

## 2018-02-28 DIAGNOSIS — E03.9 HYPOTHYROIDISM, UNSPECIFIED TYPE: ICD-10-CM

## 2018-02-28 PROCEDURE — 76536 US EXAM OF HEAD AND NECK: CPT | Mod: 26,,, | Performed by: RADIOLOGY

## 2018-02-28 PROCEDURE — 76536 US EXAM OF HEAD AND NECK: CPT | Mod: TC,PO

## 2018-03-07 ENCOUNTER — DOCUMENTATION ONLY (OUTPATIENT)
Dept: FAMILY MEDICINE | Facility: CLINIC | Age: 68
End: 2018-03-07

## 2018-03-07 ENCOUNTER — OFFICE VISIT (OUTPATIENT)
Dept: FAMILY MEDICINE | Facility: CLINIC | Age: 68
End: 2018-03-07
Payer: MEDICARE

## 2018-03-07 VITALS
WEIGHT: 189.38 LBS | DIASTOLIC BLOOD PRESSURE: 88 MMHG | SYSTOLIC BLOOD PRESSURE: 128 MMHG | BODY MASS INDEX: 34.85 KG/M2 | HEART RATE: 62 BPM | HEIGHT: 62 IN | TEMPERATURE: 98 F | OXYGEN SATURATION: 98 % | RESPIRATION RATE: 16 BRPM

## 2018-03-07 DIAGNOSIS — E78.5 HYPERLIPIDEMIA, UNSPECIFIED HYPERLIPIDEMIA TYPE: ICD-10-CM

## 2018-03-07 DIAGNOSIS — R10.13 EPIGASTRIC PAIN: ICD-10-CM

## 2018-03-07 DIAGNOSIS — R11.0 NAUSEA: ICD-10-CM

## 2018-03-07 DIAGNOSIS — E04.1 THYROID NODULE: Primary | ICD-10-CM

## 2018-03-07 PROCEDURE — 99499 UNLISTED E&M SERVICE: CPT | Mod: S$GLB,,, | Performed by: INTERNAL MEDICINE

## 2018-03-07 PROCEDURE — 99213 OFFICE O/P EST LOW 20 MIN: CPT | Mod: S$GLB,,, | Performed by: INTERNAL MEDICINE

## 2018-03-07 RX ORDER — ONDANSETRON 4 MG/1
4 TABLET, FILM COATED ORAL 2 TIMES DAILY
Qty: 20 TABLET | Refills: 1 | Status: SHIPPED | OUTPATIENT
Start: 2018-03-07 | End: 2018-09-10

## 2018-03-07 NOTE — PROGRESS NOTES
Health Maintenance Due   Topic Date Due    Influenza Vaccine  08/01/2017    Fecal Occult Blood Test (FOBT)/[employer]  01/22/2018    Mammogram  01/27/2018

## 2018-03-07 NOTE — PATIENT INSTRUCTIONS
Take Zantac 150 mg twice a day.  He need to wean off the Protonix by taking the Protonix every other day and the Zantac every other day.  After 1 week take the Protonix every third day and Zantac on the other days.  Then he can stop the Zantac.  This does not help the nausea then need to go back on the Protonix

## 2018-03-07 NOTE — PROGRESS NOTES
"Subjective:       Patient ID: Syeda Gonsalves is a 67 y.o. female.    Chief Complaint: Follow-up (ultrasound)    HPI       CHIEF COMPLAINT: Hyperlipidemia. cholesterol screening: no.   HPI:     ONSET:    MODIFIERS/TREATMENTS: . Taking medications: yes. . Non-compliance with following diet: no. .     SYMPTOMS/RELATED:Possible medication side effects include:   Myalgia: no.  .     REVIEW OF SYMPTOMS: past weights:   Wt Readings from Last 1 Encounters:   03/07/18 1406 85.9 kg (189 lb 6 oz)                                                     Last lipids: total   Lab Results   Component Value Date    CHOL 213 (H) 02/28/2018    CHOL 165 08/21/2017    CHOL 211 (H) 02/10/2017                                                                     HDL   Lab Results   Component Value Date    HDL 36 (L) 02/28/2018    HDL 36 (L) 08/21/2017    HDL 40 02/10/2017                                                                     LDL   Lab Results   Component Value Date    LDLCALC 145.4 02/28/2018    LDLCALC 95.6 08/21/2017    LDLCALC 137.8 02/10/2017                                                                     TRIG   Lab Results   Component Value Date    TRIG 158 (H) 02/28/2018    TRIG 167 (H) 08/21/2017    TRIG 166 (H) 02/10/2017                                                                         The patient has a multinodular goiter for a number years.  She had a fine-needle biopsy year ago.  Last month she had an ultrasound that indicated that 2 of the nodules have grown and that they're big enough to be biopsied again.    The patient has recurrent nausea.  She is having it about an hour and half after she eats when she first gets home from work.  Often times has epigastric pain as well.  She is on Protonix.  Review of Systems    Objective:      Vitals:    03/07/18 1406   BP: 128/88   Pulse: 62   Resp: 16   Temp: 98.1 °F (36.7 °C)   TempSrc: Oral   SpO2: 98%   Weight: 85.9 kg (189 lb 6 oz)   Height: 5' 2" (1.575 m) "   PainSc: 0-No pain     Physical Exam   Constitutional: She appears well-developed and well-nourished.   Neck: Thyromegaly present.   Cardiovascular: Normal rate, regular rhythm and normal heart sounds.    Pulmonary/Chest: Effort normal and breath sounds normal.   Abdominal: Soft. There is tenderness (epigastric tenderness).   Neurological: She is alert.   Psychiatric: She has a normal mood and affect. Her behavior is normal. Thought content normal.   Nursing note and vitals reviewed.        Assessment:       1. Thyroid nodule    2. Epigastric pain    3. Nausea    4. Hyperlipidemia, unspecified hyperlipidemia type          Plan:     Thyroid nodule  -     Cancel: TSH; Future; Expected date: 03/07/2018  -     US Fine Needle Aspiration Thyroid Multi site (xpd); Future; Expected date: 03/07/2018    Epigastric pain  -     Ambulatory consult to Gastroenterology    Nausea  -     ondansetron (ZOFRAN) 4 MG tablet; Take 1 tablet (4 mg total) by mouth 2 (two) times daily.  Dispense: 20 tablet; Refill: 1    Hyperlipidemia, unspecified hyperlipidemia type  -     Comprehensive metabolic panel; Future; Expected date: 03/07/2018  -     Lipid panel; Future; Expected date: 03/07/2018      Follow-up in about 3 months (around 6/7/2018).

## 2018-03-19 ENCOUNTER — HOSPITAL ENCOUNTER (OUTPATIENT)
Dept: RADIOLOGY | Facility: HOSPITAL | Age: 68
Discharge: HOME | End: 2018-03-19
Attending: INTERNAL MEDICINE
Payer: MEDICARE

## 2018-03-19 DIAGNOSIS — E04.1 THYROID NODULE: Primary | ICD-10-CM

## 2018-03-19 PROCEDURE — 10022 US FINE NEEDLE ASPIRATION THYROID MULTI SITE (XPD): CPT

## 2018-03-19 PROCEDURE — 76942 ECHO GUIDE FOR BIOPSY: CPT | Mod: 26,,, | Performed by: RADIOLOGY

## 2018-03-19 PROCEDURE — 88173 CYTOPATH EVAL FNA REPORT: CPT | Mod: 59 | Performed by: PATHOLOGY

## 2018-03-19 PROCEDURE — 10022 US FINE NEEDLE ASPIRATION THYROID MULTI SITE (XPD): CPT | Mod: ,,, | Performed by: RADIOLOGY

## 2018-03-19 NOTE — NURSING
Bilateral ultrasound guided FNA of the thyroid performed by Dr Cunningham. X 2 sites- pathology refugio Harris present. Pt tolerated procedure well- monitored patient to ensure hematoma wasnt present. Bandaid applied- CDI Went over discharge instructions with patient and s/s of complications. Provided patient with written instructions and Radiology nurses number if complications arise. Pt discharged home.

## 2018-03-21 ENCOUNTER — TELEPHONE (OUTPATIENT)
Dept: FAMILY MEDICINE | Facility: CLINIC | Age: 68
End: 2018-03-21

## 2018-03-21 NOTE — TELEPHONE ENCOUNTER
Spoke with patient.  States she has burning and irritation with urination.  Instructed that she would need to be seen.  Declined appointment.

## 2018-03-21 NOTE — TELEPHONE ENCOUNTER
----- Message from Aixa Crisostomo sent at 3/21/2018 11:07 AM CDT -----  Contact: patient   Patient is calling to request a prescription for urinary pain and burning. Please advise.   Call back    Thanks!    [address] - [address] [address] [employer]  [address] [facility] [address]  Phone: [phone] Fax: [phone]

## 2018-05-28 ENCOUNTER — OFFICE VISIT (OUTPATIENT)
Dept: FAMILY MEDICINE | Facility: CLINIC | Age: 68
End: 2018-05-28
Payer: MEDICARE

## 2018-05-28 VITALS
OXYGEN SATURATION: 96 % | DIASTOLIC BLOOD PRESSURE: 72 MMHG | BODY MASS INDEX: 34.72 KG/M2 | SYSTOLIC BLOOD PRESSURE: 104 MMHG | HEART RATE: 74 BPM | HEIGHT: 62 IN | TEMPERATURE: 98 F | RESPIRATION RATE: 16 BRPM | WEIGHT: 188.69 LBS

## 2018-05-28 DIAGNOSIS — Z12.39 BREAST CANCER SCREENING: ICD-10-CM

## 2018-05-28 DIAGNOSIS — J45.21 MILD INTERMITTENT ASTHMATIC BRONCHITIS WITH ACUTE EXACERBATION: Primary | ICD-10-CM

## 2018-05-28 PROCEDURE — 99214 OFFICE O/P EST MOD 30 MIN: CPT | Mod: S$GLB,,, | Performed by: INTERNAL MEDICINE

## 2018-05-28 RX ORDER — DOXYCYCLINE 100 MG/1
100 CAPSULE ORAL EVERY 12 HOURS
Qty: 20 CAPSULE | Refills: 0 | Status: SHIPPED | OUTPATIENT
Start: 2018-05-28 | End: 2018-06-13

## 2018-05-28 RX ORDER — ALBUTEROL SULFATE 90 UG/1
2 AEROSOL, METERED RESPIRATORY (INHALATION) EVERY 6 HOURS PRN
Qty: 1 EACH | Refills: 11 | Status: SHIPPED | OUTPATIENT
Start: 2018-05-28 | End: 2018-11-29 | Stop reason: ALTCHOICE

## 2018-05-28 RX ORDER — PREDNISONE 20 MG/1
40 TABLET ORAL DAILY
Qty: 10 TABLET | Refills: 0 | Status: SHIPPED | OUTPATIENT
Start: 2018-05-28 | End: 2018-06-07

## 2018-05-28 RX ORDER — BENZONATATE 200 MG/1
200 CAPSULE ORAL 3 TIMES DAILY PRN
COMMUNITY
End: 2018-08-01

## 2018-05-28 RX ORDER — MONTELUKAST SODIUM 5 MG/1
5 TABLET, CHEWABLE ORAL NIGHTLY
COMMUNITY
End: 2018-11-29

## 2018-05-28 RX ORDER — PREDNISONE 20 MG/1
20 TABLET ORAL DAILY
COMMUNITY
End: 2018-08-01

## 2018-05-28 RX ORDER — SUCRALFATE 1 G/1
1 TABLET ORAL DAILY
COMMUNITY
End: 2018-11-29 | Stop reason: ALTCHOICE

## 2018-05-28 NOTE — PROGRESS NOTES
"Subjective:       Patient ID: Syeda Gonsalves is a 67 y.o. female.    Chief Complaint: Cough (yellow mucus,3 month check up) and Sore Throat    HPI         CHIEF COMPLAINT: Cough(+).  HPI: quit smoking 6 mo ago.     ONSET/TIMIN d   ago    DURATION:               Paroxysmal: no .    QUALITY/COURSE:. unchanged    INTENSITY/SEVERITY: Severity is #  5 (10 point scale)      The following symptoms/statements are positive if BOLD, negative otherwise.      CONTEXT/WHEN:  Tobacco_use. Smokers_in_home. Seasonal_pattern. Allergies/Hayfever. Sinusitis. Irritant_Exposure(smoke/dust/fumes). Exposure_to_others_with_similar_symptoms.        Similar_problems_in_past.   PAST TREATMENT OR EVALUATION:   previous PPD. Recent_previous_chest_x-ray. Recent_antibiotics.  Associated Symptoms:     sputum production: scant. copious. Hemoptysis.  Medical History: Past_pulmonary_infections.  Cardiovascular_disease.chronic_lung_disease.  tuberculosis. Asthma. AIDS. Gastroesophageal_reflux_disease .      Review of Systems   Constitutional: Negative for chills, diaphoresis, fever and unexpected weight change.   HENT: Positive for nosebleeds, postnasal drip, rhinorrhea, sinus pressure and sore throat.    Respiratory: Positive for cough and wheezing. Negative for shortness of breath.    Cardiovascular: Negative for chest pain.   Musculoskeletal: Negative for myalgias.       Objective:      Vitals:    18 1054   BP: 104/72   Pulse: 74   Resp: 16   Temp: 98.2 °F (36.8 °C)   TempSrc: Oral   SpO2: 96%   Weight: 85.6 kg (188 lb 11.4 oz)   Height: 5' 2" (1.575 m)   PainSc:   4   PainLoc: Ear     Physical Exam   Constitutional: She appears well-developed and well-nourished.   HENT:   Right Ear: External ear normal.   Left Ear: External ear normal.   Mouth/Throat: Oropharynx is clear and moist. No oropharyngeal exudate.   Voice is hoarse   Cardiovascular: Normal rate, regular rhythm and normal heart sounds.  Exam reveals no friction rub.    No " murmur heard.  Pulmonary/Chest: Effort normal and breath sounds normal. No respiratory distress. She has no wheezes. She has no rales. She exhibits no tenderness.   Abdominal: Soft. Bowel sounds are normal. There is no tenderness.   Musculoskeletal: She exhibits no edema.   Lymphadenopathy:     She has no cervical adenopathy.   Nursing note and vitals reviewed.        Assessment:       No diagnosis found.      Plan:     There are no diagnoses linked to this encounter.  No Follow-up on file.

## 2018-05-28 NOTE — PATIENT INSTRUCTIONS
Cool mist vaporizor.  Hot fluids. Hot tea with lemon and honey.    For best results take both the Tessalon Perles and Robitussin-TEE

## 2018-05-30 ENCOUNTER — TELEPHONE (OUTPATIENT)
Dept: FAMILY MEDICINE | Facility: CLINIC | Age: 68
End: 2018-05-30

## 2018-05-30 NOTE — TELEPHONE ENCOUNTER
----- Message from Stephanie Abel sent at 5/30/2018  3:29 PM CDT -----  Contact: Patient  Syeda, patient [phone] calling because she spoke with Jin today and they told her to call the doctor's office because the PFT that was ordered needs to be authorized. Please advise. Thanks.

## 2018-06-06 ENCOUNTER — HOSPITAL ENCOUNTER (OUTPATIENT)
Dept: RESPIRATORY THERAPY | Facility: HOSPITAL | Age: 68
Discharge: HOME | End: 2018-06-06
Attending: INTERNAL MEDICINE
Payer: MEDICARE

## 2018-06-06 DIAGNOSIS — J45.21 MILD INTERMITTENT ASTHMATIC BRONCHITIS WITH ACUTE EXACERBATION: ICD-10-CM

## 2018-06-06 PROCEDURE — 94060 EVALUATION OF WHEEZING: CPT

## 2018-06-06 PROCEDURE — 94375 RESPIRATORY FLOW VOLUME LOOP: CPT

## 2018-06-06 PROCEDURE — 94729 DIFFUSING CAPACITY: CPT

## 2018-06-07 ENCOUNTER — HOSPITAL ENCOUNTER (OUTPATIENT)
Dept: RADIOLOGY | Facility: HOSPITAL | Age: 68
Discharge: HOME | End: 2018-06-07
Attending: INTERNAL MEDICINE
Payer: MEDICARE

## 2018-06-07 DIAGNOSIS — Z12.39 BREAST CANCER SCREENING: ICD-10-CM

## 2018-06-07 PROCEDURE — 77063 BREAST TOMOSYNTHESIS BI: CPT | Mod: 26,,, | Performed by: RADIOLOGY

## 2018-06-07 PROCEDURE — 77067 SCR MAMMO BI INCL CAD: CPT | Mod: TC

## 2018-06-07 PROCEDURE — 77067 SCR MAMMO BI INCL CAD: CPT | Mod: 26,,, | Performed by: RADIOLOGY

## 2018-06-12 ENCOUNTER — TELEPHONE (OUTPATIENT)
Dept: FAMILY MEDICINE | Facility: CLINIC | Age: 68
End: 2018-06-12

## 2018-06-12 NOTE — TELEPHONE ENCOUNTER
----- Message from Haydee Tatum sent at 6/12/2018  2:46 PM CDT -----  Contact: ZULEIKA APARICIO P [[id number]]    Name of Who is Calling: ZULEIKA APARICIO [[id number]]      What is the request in detail:  Patient called requesting a her test results from her mammogram and respiratory PFT.  Please give a call back at your earliest convenience.     THANKS!      Can the clinic reply by [facility]: No      What Number to Call Back: ZULEIKA APARICIO   /   #  [phone]

## 2018-06-13 ENCOUNTER — NURSE TRIAGE (OUTPATIENT)
Dept: ADMINISTRATIVE | Facility: CLINIC | Age: 68
End: 2018-06-13

## 2018-06-13 ENCOUNTER — OFFICE VISIT (OUTPATIENT)
Dept: FAMILY MEDICINE | Facility: CLINIC | Age: 68
End: 2018-06-13
Payer: MEDICARE

## 2018-06-13 ENCOUNTER — DOCUMENTATION ONLY (OUTPATIENT)
Dept: FAMILY MEDICINE | Facility: CLINIC | Age: 68
End: 2018-06-13

## 2018-06-13 VITALS
OXYGEN SATURATION: 97 % | SYSTOLIC BLOOD PRESSURE: 128 MMHG | HEART RATE: 63 BPM | DIASTOLIC BLOOD PRESSURE: 76 MMHG | RESPIRATION RATE: 16 BRPM | TEMPERATURE: 98 F | BODY MASS INDEX: 34.69 KG/M2 | WEIGHT: 188.5 LBS | HEIGHT: 62 IN

## 2018-06-13 DIAGNOSIS — R13.19 ESOPHAGEAL DYSPHAGIA: Primary | ICD-10-CM

## 2018-06-13 DIAGNOSIS — M54.6 ACUTE RIGHT-SIDED THORACIC BACK PAIN: ICD-10-CM

## 2018-06-13 DIAGNOSIS — R07.9 CHEST PAIN, UNSPECIFIED TYPE: ICD-10-CM

## 2018-06-13 PROCEDURE — 93000 ELECTROCARDIOGRAM COMPLETE: CPT | Mod: S$GLB,,, | Performed by: INTERNAL MEDICINE

## 2018-06-13 PROCEDURE — 99214 OFFICE O/P EST MOD 30 MIN: CPT | Mod: S$GLB,,, | Performed by: INTERNAL MEDICINE

## 2018-06-13 RX ORDER — NITROGLYCERIN 0.4 MG/1
0.4 TABLET SUBLINGUAL EVERY 5 MIN PRN
Qty: 20 TABLET | Refills: 1 | Status: SHIPPED | OUTPATIENT
Start: 2018-06-13 | End: 2022-12-09

## 2018-06-13 RX ORDER — NAPROXEN SODIUM 220 MG/1
81 TABLET, FILM COATED ORAL DAILY
Start: 2018-06-13 | End: 2018-09-10

## 2018-06-13 NOTE — PROGRESS NOTES
Health Maintenance Due   Topic Date Due    Fecal Occult Blood Test (FOBT)/[employer]  01/22/2018

## 2018-06-13 NOTE — TELEPHONE ENCOUNTER
"  Reason for Disposition   Information only question and nurse able to answer    Protocols used: ST NO PROTOCOL CALL: INFORMATION ONLY-A-[place]    Syeda is having intermittent "chest squeezing"  Mainly at night or in evening when working.  No other symptoms to report.  Has appointment this afternoon and just wanted to talk to on call nurse to see if ok to wait for appointment.  No pain at this time.  Advised ok to wait for appointment.  Please contact caller directly with any further care advice.     "

## 2018-06-13 NOTE — PROCEDURES
DATE OF PROCEDURE:  06/06/2018.    REQUESTING PHYSICIAN:  Dr. Gaitan    REASON FOR STUDY:  Asthma.    STUDY:  The patient's spirometry is within normal limits.  The lung volumes are   within normal limits.  Diffusion capacity is within normal limits.    COMMENTS:  The patient gave good effort, albuterol was given.    CONCLUSION:  Normal study with no change with bronchodilators.      ALLYSON  dd: 06/13/2018 05:53:31 (CDT)  td: 06/13/2018 10:48:44 (CDT)  Doc ID   #[id number]  Job ID #[id number]    CC:

## 2018-06-13 NOTE — PROGRESS NOTES
Subjective:       Patient ID: Syeda Gonsalves is a 67 y.o. female.    Chief Complaint: Back Pain and muscle pain chest    HPI         CHIEF COMPLAINT: Back Pain.  HPI: took half of a norco 5 with relief.     ONSET/TIMING: Onset   3 wks    ago. . Inciting event:  Lifting: no.  Over-exertion: no.     DURATION: . Intermittent 20 mins.     QUALITY/COURSE:   unchanged    LOCATION:       Right s1         Radiation:   r arm    INTENSITY/SEVERITY: Severity is #    5  (10 point scale)..      MODIFIERS/TREATMENTS: .. Taking medications:    yes. . . Litigation_pending: no ..  MRI: no .    SYMPTOMS/RELATED: . --Possible medication side effects include:  .     The symptoms/statements  below are positive if BOLDED, otherwise negative.           CONTEXT/WHEN: . --Activity. . Coughing..  Bending.  Sitting. Hx_of_CA:.. History_of_IV_drug_abuse.  Work_related.. Similar_problems _in_past. Trauma .       REVIEW OF SYMPTOMS:       .Leg _Pain_to_below_knee.  Hip_pain..  Weight_loss.  Incontinence .  dyspareunia .  Weakness.  Numbness.        CHIEF COMPLAINT: CHEST PAIN    HPI: food sticks in lower chest.  He has chest pain with swallowing solids only.  Also pills.  Was on doxycycline.  Pain radiates from back to right chest.     ONSET/TIMING:   3 wks  ago    DURATION:    QUALITY/COURSE:  unchanged.    SEVERITY: #    5  /10 ( on 1 to 10 scale)    PREVIOUS CARDIAC TESTING: : electrocardiogram (ECG).    LOCATION:  substernal   Radiation -  none    All choices for next 4 sections below are positive to the patient ONLY if BOLDED, otherwise negative:    AGGRAVATING FACTORS: Swallowing, , Deep_Breathing, Coughing, Neck/Arm/Chest_Movement     RELIEVING FACTORS: Nitroglycerin, Resting, Change_of_Position    ASSOCIATED SYMPTOMS:  Hemoptysis,Tenderness,  Leg_Pain    RISK FACTORS: Diabetes, HTN, Hyperlipidemia, smoking,                     Review of Systems      Objective:      Vitals:    06/13/18 1435   BP: 128/76   Pulse: 63   Resp: 16   Temp:  "98.1 °F (36.7 °C)   TempSrc: Oral   SpO2: 97%   Weight: 85.5 kg (188 lb 7.9 oz)   Height: 5' 2" (1.575 m)   PainSc:   9   PainLoc: Chest     Physical Exam   Constitutional: She appears well-developed and well-nourished.   Cardiovascular: Normal rate, regular rhythm and normal heart sounds.    Pulmonary/Chest: Effort normal and breath sounds normal.   Abdominal: Soft. There is no tenderness.   Neurological: She is alert.   Psychiatric: She has a normal mood and affect. Her behavior is normal. Thought content normal.   Nursing note and vitals reviewed.        Assessment:       1. Esophageal dysphagia    2. Chest pain, unspecified type    3. Acute right-sided thoracic back pain          Plan:       Esophageal dysphagia  -     Ambulatory consult to Gastroenterology    Chest pain, unspecified type  -     aspirin 81 MG Chew; Take 1 tablet (81 mg total) by mouth once daily.  -     nitroGLYCERIN (NITROSTAT) 0.4 MG SL tablet; Place 1 tablet (0.4 mg total) under the tongue every 5 (five) minutes as needed for Chest pain.  Dispense: 20 tablet; Refill: 1  -     EKG 12-lead; Future  -     Ambulatory Referral to Cardiology    Acute right-sided thoracic back pain      Follow-up in about 6 weeks (around 7/25/2018) for if you are not better return in one week.      "

## 2018-06-13 NOTE — PATIENT INSTRUCTIONS
If you get a chest pain last more than 5 minutes taken nitroglycerin and some Mylanta.  If it lasts more than 20 minutes call 911    Get a smart temp cold pack    Stop doxycycline    Please chew your food well

## 2018-06-14 ENCOUNTER — TELEPHONE (OUTPATIENT)
Dept: FAMILY MEDICINE | Facility: CLINIC | Age: 68
End: 2018-06-14

## 2018-06-14 NOTE — TELEPHONE ENCOUNTER
----- Message from Vannessa Fields sent at 6/14/2018  4:31 PM CDT -----  Type: Needs Medical Advice    Who Called:  Patient  Best Call Back Number: [phone]  Additional Information: Patient needs to ask office a question about her predniSONE (DELTASONE) 20 MG tablet and if she should continue it.

## 2018-07-09 ENCOUNTER — PES CALL (OUTPATIENT)
Dept: ADMINISTRATIVE | Facility: CLINIC | Age: 68
End: 2018-07-09

## 2018-07-09 ENCOUNTER — TELEPHONE (OUTPATIENT)
Dept: FAMILY MEDICINE | Facility: CLINIC | Age: 68
End: 2018-07-09

## 2018-07-09 NOTE — TELEPHONE ENCOUNTER
----- Message from Janeth Erickson sent at 7/9/2018  1:26 PM CDT -----  Contact: Pt  Pt says she just missed a call and asked if she can get return call    Pt can be reached at [phone]    Thanks

## 2018-07-12 ENCOUNTER — INITIAL CONSULT (OUTPATIENT)
Dept: GASTROENTEROLOGY | Facility: CLINIC | Age: 68
End: 2018-07-12
Payer: MEDICARE

## 2018-07-12 VITALS
HEART RATE: 66 BPM | BODY MASS INDEX: 34.64 KG/M2 | DIASTOLIC BLOOD PRESSURE: 64 MMHG | WEIGHT: 189.38 LBS | SYSTOLIC BLOOD PRESSURE: 133 MMHG

## 2018-07-12 DIAGNOSIS — R10.13 EPIGASTRIC PAIN: Primary | ICD-10-CM

## 2018-07-12 DIAGNOSIS — R68.81 EARLY SATIETY: ICD-10-CM

## 2018-07-12 DIAGNOSIS — R11.0 NAUSEA: ICD-10-CM

## 2018-07-12 PROCEDURE — 99214 OFFICE O/P EST MOD 30 MIN: CPT | Mod: S$GLB,,, | Performed by: INTERNAL MEDICINE

## 2018-07-12 PROCEDURE — 99999 PR PBB SHADOW E&M-EST. PATIENT-LVL II: CPT | Mod: PBBFAC,,, | Performed by: INTERNAL MEDICINE

## 2018-07-12 NOTE — LETTER
July 13, 2018      Rikki Gaitan MD  [address]  [place] [address]           [facility]  [address]  [place] [address]  Phone: [phone]          Patient: Syeda Gonsalves   MR Number: [id number]   YOB: 1950   Date of Visit: 7/12/2018       Dear Dr. Rikki Gaitan:    Thank you for referring Syeda Gonsalves to me for evaluation. Attached you will find relevant portions of my assessment and plan of care.    If you have questions, please do not hesitate to call me. I look forward to following Syeda Gonsalves along with you.    Sincerely,    Kaylan Yen LPN    Enclosure  CC:  No Recipients    If you would like to receive this communication electronically, please contact [email or web address] or [phone] to request more information on [employer] Link access.    For providers and/or their staff who would like to refer a patient to [facility], please contact us through our one-stop-shop provider referral line, [facility], at [phone].    If you feel you have received this communication in error or would no longer like to receive these types of communications, please e-mail [email or web address]

## 2018-07-12 NOTE — PROGRESS NOTES
[facility]     CC: Abdominal pain    HPI 67 y.o. female presents for evaluation of several months of chronic, intermittent, primarily epigastric abdominal pain and discomfort, associated with early satiety, bloating, nausea and increased flatus. She does not have weight loss but actually has weight gain. She denies dysphagia. Her symptoms have somewhat improved with Ranitidine.       Past Medical History:   Diagnosis Date    Abnormal Pap smear 1990    Arthritis     Asthma     Cervical cancer 1990    Had a complete hysterectomy    GERD (gastroesophageal reflux disease)     H/O head and neck radiation 1/11/2017    Hyperlipidemia     Kidney insufficiency     Multinodular goiter 1/11/2017    Smoking 1/11/2017       Past Surgical History:   Procedure Laterality Date    ADRENALECTOMY      CARPAL TUNNEL RELEASE      CHOLECYSTECTOMY      COLONOSCOPY N/A 12/21/2017    Procedure: COLONOSCOPY;  Surgeon: Harriett Pereyra MD;  Location: [facility];  Service: Endoscopy;  Laterality: N/A;    HYSTERECTOMY      OOPHORECTOMY      TONSILLECTOMY         Social History   Substance Use Topics    Smoking status: Former Smoker     Quit date: 12/21/2017    Smokeless tobacco: Never Used    Alcohol use No   -No illicits    Family History   Problem Relation Age of Onset    Breast cancer Neg Hx     Colon cancer Neg Hx     Ovarian cancer Neg Hx     Cancer Neg Hx        Review of Systems  General ROS: negative for - chills, fever or weight loss; + weight gain  Psychological ROS: negative for - hallucination, depression or suicidal ideation  Ophthalmic ROS: negative for - blurry vision, photophobia or eye pain  ENT ROS: negative for - epistaxis, sore throat or rhinorrhea  Respiratory ROS: no cough, shortness of breath, or wheezing  Cardiovascular ROS: no chest pain or dyspnea on exertion  Gastrointestinal ROS: + abdominal pain, + nausea, + early satiety  Genito-Urinary ROS: no dysuria, trouble voiding, or  hematuria  Musculoskeletal ROS: negative for - arthralgia, myalgia, weakness; + chronic pain  Neurological ROS: no syncope or seizures; no ataxia  Dermatological ROS: negative for pruritis, rash and jaundice    Physical Examination  /64   Pulse 66   Wt 85.9 kg (189 lb 6 oz)   LMP 09/25/1990   BMI 34.64 kg/m²   General appearance: alert, cooperative, no distress  HENT: Normocephalic, atraumatic, neck symmetrical, no nasal discharge   Eyes: conjunctivae/corneas clear, PERRL, EOM's intact, sclera anicteric  Lungs: clear to auscultation bilaterally, no dullness to percussion bilaterally, symmetric expansion, breathing unlabored  Heart: regular rate and rhythm without rub; no displacement of the PMI   Abdomen: obese, soft, nontender, nodistended, BS normoactive, no organomegaly appreciated  Extremities: extremities symmetric; no clubbing, cyanosis, or edema  Integument: Skin color, texture, turgor normal; no rashes; hair distrubution normal, no jaundice  Neurologic: Alert and oriented X 3, no focal sensory or motor neurologic deficits  Psychiatric: no pressured speech; normal affect; no evidence of impaired cognition, no anxiety/depression     Labs:  Lab Results   Component Value Date    WBC 6.62 02/28/2018    HGB 14.4 02/28/2018    HCT 43.9 02/28/2018    MCV 88 02/28/2018     02/28/2018     -LFTs- normal    Assessment:   67 y.o. female presents for evaluation of abdominal pain and discomfort associated with early satiety, bloating, nausea and gas.     Plan:  -Continue Ranitidine  -Schedule EGD with biopsies for H.pylori  -Small, frequent meals  -If above unremarkable consider GES as next step    Harriett Pereyra MD  [facility]  [address], [address]  [place] [address]  Office: [phone]  Fax: [phone]

## 2018-07-13 ENCOUNTER — TELEPHONE (OUTPATIENT)
Dept: GASTROENTEROLOGY | Facility: CLINIC | Age: 68
End: 2018-07-13

## 2018-07-13 NOTE — TELEPHONE ENCOUNTER
----- Message from Vandana De La Cruz sent at 7/13/2018 12:26 PM CDT -----  Contact: self  Has personal questions from Dr Pereyra. Please call back at [phone] (home)

## 2018-07-18 ENCOUNTER — ANESTHESIA EVENT (OUTPATIENT)
Dept: ENDOSCOPY | Facility: HOSPITAL | Age: 68
End: 2018-07-18
Payer: MEDICARE

## 2018-07-18 ENCOUNTER — HOSPITAL ENCOUNTER (OUTPATIENT)
Facility: HOSPITAL | Age: 68
Discharge: HOME | End: 2018-07-18
Attending: INTERNAL MEDICINE | Admitting: INTERNAL MEDICINE
Payer: MEDICARE

## 2018-07-18 ENCOUNTER — ANESTHESIA (OUTPATIENT)
Dept: ENDOSCOPY | Facility: HOSPITAL | Age: 68
End: 2018-07-18
Payer: MEDICARE

## 2018-07-18 ENCOUNTER — SURGERY (OUTPATIENT)
Age: 68
End: 2018-07-18

## 2018-07-18 VITALS
HEIGHT: 62 IN | HEART RATE: 58 BPM | TEMPERATURE: 97 F | DIASTOLIC BLOOD PRESSURE: 69 MMHG | SYSTOLIC BLOOD PRESSURE: 145 MMHG | RESPIRATION RATE: 18 BRPM | OXYGEN SATURATION: 100 %

## 2018-07-18 DIAGNOSIS — K29.90 GASTRITIS AND DUODENITIS: Primary | ICD-10-CM

## 2018-07-18 DIAGNOSIS — R10.9 ABDOMINAL PAIN: ICD-10-CM

## 2018-07-18 PROCEDURE — 63600175 PHARM REV CODE 636 W HCPCS: Performed by: NURSE ANESTHETIST, CERTIFIED REGISTERED

## 2018-07-18 PROCEDURE — 43239 EGD BIOPSY SINGLE/MULTIPLE: CPT | Mod: ,,, | Performed by: INTERNAL MEDICINE

## 2018-07-18 PROCEDURE — 27201012 HC FORCEPS, HOT/COLD, DISP: Performed by: INTERNAL MEDICINE

## 2018-07-18 PROCEDURE — D9220A PRA ANESTHESIA: Mod: CRNA,,, | Performed by: NURSE ANESTHETIST, CERTIFIED REGISTERED

## 2018-07-18 PROCEDURE — 43239 EGD BIOPSY SINGLE/MULTIPLE: CPT | Performed by: INTERNAL MEDICINE

## 2018-07-18 PROCEDURE — 25000003 PHARM REV CODE 250: Performed by: INTERNAL MEDICINE

## 2018-07-18 PROCEDURE — 88305 TISSUE EXAM BY PATHOLOGIST: CPT | Mod: 26,,, | Performed by: PATHOLOGY

## 2018-07-18 PROCEDURE — 88305 TISSUE EXAM BY PATHOLOGIST: CPT | Performed by: PATHOLOGY

## 2018-07-18 PROCEDURE — 37000008 HC ANESTHESIA 1ST 15 MINUTES: Performed by: INTERNAL MEDICINE

## 2018-07-18 PROCEDURE — D9220A PRA ANESTHESIA: Mod: ANES,,, | Performed by: ANESTHESIOLOGY

## 2018-07-18 RX ORDER — PROPOFOL 10 MG/ML
VIAL (ML) INTRAVENOUS
Status: DISCONTINUED | OUTPATIENT
Start: 2018-07-18 | End: 2018-07-18

## 2018-07-18 RX ORDER — SODIUM CHLORIDE 9 MG/ML
INJECTION, SOLUTION INTRAVENOUS CONTINUOUS
Status: DISCONTINUED | OUTPATIENT
Start: 2018-07-18 | End: 2018-07-18 | Stop reason: HOSPADM

## 2018-07-18 RX ORDER — LIDOCAINE HCL/PF 100 MG/5ML
SYRINGE (ML) INTRAVENOUS
Status: DISCONTINUED | OUTPATIENT
Start: 2018-07-18 | End: 2018-07-18

## 2018-07-18 RX ADMIN — PROPOFOL 100 MG: 10 INJECTION, EMULSION INTRAVENOUS at 11:07

## 2018-07-18 RX ADMIN — SODIUM CHLORIDE: 0.9 INJECTION, SOLUTION INTRAVENOUS at 11:07

## 2018-07-18 RX ADMIN — LIDOCAINE HYDROCHLORIDE 100 MG: 20 INJECTION, SOLUTION INTRAVENOUS at 11:07

## 2018-07-18 NOTE — TRANSFER OF CARE
"Anesthesia Transfer of Care Note    Patient: Syeda Gonsalves    Procedure(s) Performed: Procedure(s) (LRB):  EGD (ESOPHAGOGASTRODUODENOSCOPY) (N/A)    Patient location: GI    Anesthesia Type: general    Transport from OR: Transported from OR on room air with adequate spontaneous ventilation    Post pain: adequate analgesia    Post assessment: no apparent anesthetic complications    Post vital signs: stable    Level of consciousness: awake    Nausea/Vomiting: no nausea/vomiting    Complications: none    Transfer of care protocol was followed      Last vitals:   Visit Vitals  BP (!) 142/69   Pulse (!) 58   Temp 36 °C (96.8 °F) (Tympanic)   Resp 12   Ht 5' 2" (1.575 m)   LMP 09/25/1990   SpO2 100%   Breastfeeding? No     "

## 2018-07-18 NOTE — DISCHARGE INSTRUCTIONS
Gastritis (Adult)    Gastritis is inflammation and irritation of the stomach lining. It can be present for a short time (acute) or be long lasting (chronic). Gastritis is often caused by infection with bacteria called H pylori. More than a third of people in the [place] have this bacteria in their bodies. In many cases, H pylori causes no problems or symptoms. In some people, though, the infection irritates the stomach lining and causes gastritis. Other causes of stomach irritation include drinking alcohol or taking pain-relieving medicines called NSAIDs (such as aspirin or ibuprofen).   Symptoms of gastritis can include:  · Abdominal pain or bloating  · Loss of appetite  · Nausea or vomiting  · Vomiting blood or having black stools  · Feeling more tired than usual  An inflamed and irritated stomach lining is more likely to develop a sore called an ulcer. To help prevent this, gastritis should be treated.  Home care  If needed, medicines may be prescribed. If you have H pylori infection, treating it will likely relieve your symptoms. Other changes can help reduce stomach irritation and help it heal.  · If you have been prescribed medicines for H pylori infection, take them as directed. Take all of the medicine until it is finished or your healthcare provider tells you to stop, even if you feel better.  · Your healthcare provider may recommend avoiding NSAIDs. If you take daily aspirin for your heart or other medical reasons, do not stop without talking to your healthcare provider first.  · Avoid drinking alcohol.  · Stop smoking. Smoking can irritate the stomach and delay healing. As much as possible, stay away from second hand smoke.  Follow-up care  Follow up with your healthcare provider, or as advised by our staff. Testing may be needed to check for inflammation or an ulcer.  When to seek medical advice  Call your healthcare provider for any of the following:  · Stomach pain that gets worse or moves to the lower  right abdomen (appendix area)  · Chest pain that appears or gets worse, or spreads to the back, neck, shoulder, or arm  · Frequent vomiting (cant keep down liquids)  · Blood in the stool or vomit (red or black in color)  · Feeling weak or dizzy  · Fever of 100.4ºF (38ºC) or higher, or as directed by your healthcare provider  Date Last Reviewed: 6/22/2015 © 2000-2017 [employer]. [address] [place] [address], [place] [address]. All rights reserved. This information is not intended as a substitute for professional medical care. Always follow your healthcare professional's instructions.        Understanding Gastric Ulcers    A gastric ulcer is an open sore in the stomach lining. It is sometimes called a peptic ulcer. This is a more general term for ulcers that may be in the stomach or the upper part of the small intestine. Ulcers can cause pain. But they may also have no symptoms for a long time.  What causes gastric ulcers?  Gastric ulcers have a few common causes. To find the cause of your ulcer, your healthcare provider will give you an exam and take your health history. He or she may also order some tests. The main causes of gastric ulcers include:  · Infection with the H. pylori (Helicobacter pylori) bacteria. This damages the stomach lining. Digestive juices can then harm the digestive tract.  · Long-term use of some over-the-counter pain medicines. This reduces the bodys ability to protect the stomach from damage.  Other causes of gastric ulcers include:  · Heavy alcohol use  · Having a family history of ulcers  · In rare cases, a tumor in the digestive tract may cause an ulcer  Symptoms of a gastric ulcer  Ulcer symptoms may appear and then go away for a time. Symptoms of a gastric ulcer may include:  · Stomach pain, often a dull or burning feeling toward the top of your belly  · Feeling full or bloated  · Heartburn or acid reflux  · Upset stomach (nausea) or vomiting  · Vomiting blood  · Lack of  appetite  · Weight loss  · Black stool  · Red blood in the stool  Treatment for a gastric ulcer  Treatment for gastric ulcers may depend on what is causing them. Treatment may include:  · Not using over-the-counter medicines. You will likely need to stop taking these medicines. But in some cases these medicines cant be safely stopped. Check with your healthcare provider to see what is best for you.   · Taking medicines to ease symptoms. These medicines may help to reduce the amount of acid your stomach makes. They also may help coat your stomach lining.  · Taking antibiotics. If your ulcer was caused by H. pylori, your provider will likely prescribe antibiotics to get rid of the infection.  · Having an endoscopy. This is often done to check the stomach and diagnose the ulcer. In some cases it can also treat the ulcer. It involves passing a flexible tube through your mouth into your stomach and small intestine.  · Using a tube (catheter) to stop the bleeding. A thin tube is passed into one of your blood vessels. Special tools are used to help stop the bleeding.  · Having surgery. You often may need this if the ulcer has caused severe symptoms.  Making some lifestyle changes can reduce ulcer symptoms. It may also prevent more damage to your digestive tract. These changes include:  · Not taking over-the-counter pain medicines. Talk with your provider about using another type of pain reliever.  · Not taking aspirin unless your provider has advised it  · Limiting the amount of alcohol you drink  · Quitting smoking  Possible complications of a gastric ulcer  Gastric ulcers can have serious complications. These can include:  · Bleeding into the stomach  · A hole (perforation) in the stomach  · A blockage that interferes with food moving from your stomach to the small intestine  An ongoing infection with H. pylori may be a risk factor for stomach cancer. This is one reason it is important to get rid of this bacteria.  When  to call your healthcare provider  Call your healthcare provider right away if you have any of these:  · Vomiting blood, or vomit that looks like coffee grounds  · Bloody, black, or tarry-looking stools  · Fever of 100.4°F (38°C) or higher, or as directed  · Pain that gets worse  · Symptoms that dont get better with treatment, or symptoms that get worse  · New symptoms   Date Last Reviewed: 5/1/2016  © 4425-6479 [employer]. [address] [place] [address], [place] [address]. All rights reserved. This information is not intended as a substitute for professional medical care. Always follow your healthcare professional's instructions.

## 2018-07-18 NOTE — [employer]ATION PATIENT INSTRUCTIONS
Discharge Summary/Instructions after an Endoscopic Procedure  Patient Name: Syeda Gonsalves  Patient MRN: [id number]  Patient YOB: 1950 Wednesday, July 18, 2018  Harriett Pereyra MD  RESTRICTIONS:  During your procedure today, you received medications for sedation.  These   medications may affect your judgment, balance and coordination.  Therefore,   for 24 hours, you have the following restrictions:   - DO NOT drive a car, operate machinery, make legal/financial decisions,   sign important papers or drink alcohol.    ACTIVITY:  Today: no heavy lifting, straining or running due to procedural   sedation/anesthesia.  The following day: return to full activity including work.  DIET:  Eat and drink normally unless instructed otherwise.     TREATMENT FOR COMMON SIDE EFFECTS:  - Mild abdominal pain, nausea, belching, bloating or excessive gas:  rest,   eat lightly and use a heating pad.  - Sore Throat: treat with throat lozenges and/or gargle with warm salt   water.  - Because air was used during the procedure, expelling large amounts of air   from your rectum or belching is normal.  - If a bowel prep was taken, you may not have a bowel movement for 1-3 days.    This is normal.  SYMPTOMS TO WATCH FOR AND REPORT TO YOUR PHYSICIAN:  1. Abdominal pain or bloating, other than gas cramps.  2. Chest pain.  3. Back pain.  4. Signs of infection such as: chills or fever occurring within 24 hours   after the procedure.  5. Rectal bleeding, which would show as bright red, maroon, or black stools.   (A tablespoon of blood from the rectum is not serious, especially if   hemorrhoids are present.)  6. Vomiting.  7. Weakness or dizziness.  GO DIRECTLY TO THE NEAREST EMERGENCY ROOM IF YOU HAVE ANY OF THE FOLLOWING:      Difficulty breathing              Chills and/or fever over 101 F   Persistent vomiting and/or vomiting blood   Severe abdominal pain   Severe chest pain   Black, tarry stools   Bleeding- more than  one tablespoon   Any other symptom or condition that you feel may need urgent attention  Your doctor recommends these additional instructions:  If any biopsies were taken, your doctors clinic will contact you in 1 to 2   weeks with any results.  - Discharge patient to home (with escort).   - Patient has a contact number available for emergencies.  The signs and   symptoms of potential delayed complications were discussed with the   patient.  Return to normal activities tomorrow.  Written discharge   instructions were provided to the patient.   - Resume previous diet.   - Change Ranitidine to daily PPI x 6 weeks  - Await pathology results. If unremarkable will order GES as next step +/-   trial of IBgard  For questions, problems or results please call your physician - Harriett Pereyra MD at Work:  [phone].  [facility], EMERGENCY ROOM PHONE NUMBER: [phone]  IF A COMPLICATION OR EMERGENCY SITUATION ARISES AND YOU ARE UNABLE TO REACH   YOUR PHYSICIAN - GO DIRECTLY TO THE EMERGENCY ROOM.  Harriett Pereyra MD  7/18/2018 11:34:07 AM  This report has been verified and signed electronically.  [employer]ATION

## 2018-07-18 NOTE — H&P (VIEW-ONLY)
[facility]     CC: Abdominal pain    HPI 67 y.o. female presents for evaluation of several months of chronic, intermittent, primarily epigastric abdominal pain and discomfort, associated with early satiety, bloating, nausea and increased flatus. She does not have weight loss but actually has weight gain. She denies dysphagia. Her symptoms have somewhat improved with Ranitidine.       Past Medical History:   Diagnosis Date    Abnormal Pap smear 1990    Arthritis     Asthma     Cervical cancer 1990    Had a complete hysterectomy    GERD (gastroesophageal reflux disease)     H/O head and neck radiation 1/11/2017    Hyperlipidemia     Kidney insufficiency     Multinodular goiter 1/11/2017    Smoking 1/11/2017       Past Surgical History:   Procedure Laterality Date    ADRENALECTOMY      CARPAL TUNNEL RELEASE      CHOLECYSTECTOMY      COLONOSCOPY N/A 12/21/2017    Procedure: COLONOSCOPY;  Surgeon: Harriett Pereyra MD;  Location: [facility];  Service: Endoscopy;  Laterality: N/A;    HYSTERECTOMY      OOPHORECTOMY      TONSILLECTOMY         Social History   Substance Use Topics    Smoking status: Former Smoker     Quit date: 12/21/2017    Smokeless tobacco: Never Used    Alcohol use No   -No illicits    Family History   Problem Relation Age of Onset    Breast cancer Neg Hx     Colon cancer Neg Hx     Ovarian cancer Neg Hx     Cancer Neg Hx        Review of Systems  General ROS: negative for - chills, fever or weight loss; + weight gain  Psychological ROS: negative for - hallucination, depression or suicidal ideation  Ophthalmic ROS: negative for - blurry vision, photophobia or eye pain  ENT ROS: negative for - epistaxis, sore throat or rhinorrhea  Respiratory ROS: no cough, shortness of breath, or wheezing  Cardiovascular ROS: no chest pain or dyspnea on exertion  Gastrointestinal ROS: + abdominal pain, + nausea, + early satiety  Genito-Urinary ROS: no dysuria, trouble voiding, or  hematuria  Musculoskeletal ROS: negative for - arthralgia, myalgia, weakness; + chronic pain  Neurological ROS: no syncope or seizures; no ataxia  Dermatological ROS: negative for pruritis, rash and jaundice    Physical Examination  /64   Pulse 66   Wt 85.9 kg (189 lb 6 oz)   LMP 09/25/1990   BMI 34.64 kg/m²   General appearance: alert, cooperative, no distress  HENT: Normocephalic, atraumatic, neck symmetrical, no nasal discharge   Eyes: conjunctivae/corneas clear, PERRL, EOM's intact, sclera anicteric  Lungs: clear to auscultation bilaterally, no dullness to percussion bilaterally, symmetric expansion, breathing unlabored  Heart: regular rate and rhythm without rub; no displacement of the PMI   Abdomen: obese, soft, nontender, nodistended, BS normoactive, no organomegaly appreciated  Extremities: extremities symmetric; no clubbing, cyanosis, or edema  Integument: Skin color, texture, turgor normal; no rashes; hair distrubution normal, no jaundice  Neurologic: Alert and oriented X 3, no focal sensory or motor neurologic deficits  Psychiatric: no pressured speech; normal affect; no evidence of impaired cognition, no anxiety/depression     Labs:  Lab Results   Component Value Date    WBC 6.62 02/28/2018    HGB 14.4 02/28/2018    HCT 43.9 02/28/2018    MCV 88 02/28/2018     02/28/2018     -LFTs- normal    Assessment:   67 y.o. female presents for evaluation of abdominal pain and discomfort associated with early satiety, bloating, nausea and gas.     Plan:  -Continue Ranitidine  -Schedule EGD with biopsies for H.pylori  -Small, frequent meals  -If above unremarkable consider GES as next step    Harriett Pereyra MD  [facility]  [address], [address]  [place] [address]  Office: [phone]  Fax: [phone]

## 2018-07-18 NOTE — ANESTHESIA POSTPROCEDURE EVALUATION
"Anesthesia Post Evaluation    Patient: Syeda Gonsalves    Procedure(s) Performed: Procedure(s) (LRB):  EGD (ESOPHAGOGASTRODUODENOSCOPY) (N/A)    Final Anesthesia Type: general  Patient location during evaluation: PACU  Patient participation: Yes- Able to Participate  Level of consciousness: awake and alert  Post-procedure vital signs: reviewed and stable  Pain management: adequate  Airway patency: patent  PONV status at discharge: No PONV  Anesthetic complications: no      Cardiovascular status: blood pressure returned to baseline  Respiratory status: unassisted  Hydration status: euvolemic  Follow-up not needed.        Visit Vitals  BP (!) 145/69 (BP Location: Left arm, Patient Position: Lying)   Pulse (!) 58   Temp 36 °C (96.8 °F) (Tympanic)   Resp 18   Ht 5' 2" (1.575 m)   LMP 09/25/1990   SpO2 100%   Breastfeeding? No       Pain/Benita Score: Pain Assessment Performed: Yes (7/18/2018 11:41 AM)  Presence of Pain: denies (7/18/2018 11:41 AM)  Benita Score: 10 (7/18/2018 11:52 AM)      "

## 2018-07-18 NOTE — ANESTHESIA PREPROCEDURE EVALUATION
07/18/2018  Syeda Gonsalves is a 67 y.o., female.    Pre-op Assessment    I have reviewed the Patient Summary Reports.     I have reviewed the Nursing Notes.   I have reviewed the Medications.     Review of Systems  Anesthesia Hx:  Denies Family Hx of Anesthesia complications.   Denies Personal Hx of Anesthesia complications.   Cardiovascular:  Cardiovascular Normal     Pulmonary:   Asthma mild    Renal/:   Chronic Renal Disease, CRI    Hepatic/GI:   GERD    Neurological:  Neurology Normal    Endocrine:  Endocrine Normal        Physical Exam  General:  Obesity    Airway/Jaw/Neck:  Airway Findings: Mouth Opening: Normal Tongue: Normal  General Airway Assessment: Adult  Mallampati: II  TM Distance: Normal, at least 6 cm  Jaw/Neck Findings:  Neck ROM: Normal ROM      Dental:  Dental Findings: In tact   Chest/Lungs:  Chest/Lungs Clear    Heart/Vascular:  Heart Findings: Normal            Anesthesia Plan  Type of Anesthesia, risks & benefits discussed:  Anesthesia Type:  general  Patient's Preference:   Intra-op Monitoring Plan: standard ASA monitors  Intra-op Monitoring Plan Comments:   Post Op Pain Control Plan:   Post Op Pain Control Plan Comments:   Induction:   IV  Beta Blocker:  Patient is not currently on a Beta-Blocker (No further documentation required).       Informed Consent: Patient understands risks and agrees with Anesthesia plan.  Questions answered. Anesthesia consent signed with patient.  ASA Score: 2     Day of Surgery Review of History & Physical:    H&P update referred to the provider.         Ready For Surgery From Anesthesia Perspective.

## 2018-07-20 DIAGNOSIS — Z12.11 COLON CANCER SCREENING: Primary | ICD-10-CM

## 2018-07-24 ENCOUNTER — TELEPHONE (OUTPATIENT)
Dept: GASTROENTEROLOGY | Facility: CLINIC | Age: 68
End: 2018-07-24

## 2018-07-24 NOTE — TELEPHONE ENCOUNTER
----- Message from Saira Millan sent at 7/24/2018  3:50 PM CDT -----  Contact: patient  Patient requesting call back regarding the results from her upper GI. Please call [phone]

## 2018-07-24 NOTE — TELEPHONE ENCOUNTER
Returned call to pt regarding results. Informed pt that no final results as of yet released from . As soon as results are released, I will contact her.

## 2018-07-27 ENCOUNTER — TELEPHONE (OUTPATIENT)
Dept: FAMILY MEDICINE | Facility: CLINIC | Age: 68
End: 2018-07-27

## 2018-07-27 ENCOUNTER — TELEPHONE (OUTPATIENT)
Dept: GASTROENTEROLOGY | Facility: CLINIC | Age: 68
End: 2018-07-27

## 2018-07-27 NOTE — TELEPHONE ENCOUNTER
----- Message from Nixon Gates sent at 7/27/2018  2:41 PM CDT -----  Contact: Pt  The Pt is requesting a call back regarding a leave of absence and some paper work to be filled out. Please call Pt to advise.     Call Back#: [phone]   Thanks

## 2018-07-27 NOTE — TELEPHONE ENCOUNTER
----- Message from Carmenofelia Olsen sent at 7/27/2018  1:01 PM CDT -----  Patient states that she need to speak to you in regards to paperwork that need to be completed and the timeframe of getting it back.  Please call patient [phone].

## 2018-07-27 NOTE — TELEPHONE ENCOUNTER
Returned call to pt. Gave pt results/recommendations per . Informed pt that the only date that our office can sign for out of work, was to cover the date of her procedure only. Pt stated she needs paperwork for her leave of absence.

## 2018-07-30 ENCOUNTER — TELEPHONE (OUTPATIENT)
Dept: FAMILY MEDICINE | Facility: CLINIC | Age: 68
End: 2018-07-30

## 2018-07-30 ENCOUNTER — PES CALL (OUTPATIENT)
Dept: ADMINISTRATIVE | Facility: CLINIC | Age: 68
End: 2018-07-30

## 2018-07-30 NOTE — TELEPHONE ENCOUNTER
----- Message from Chiara Urena sent at 7/30/2018  7:27 AM CDT -----  Type: Needs Medical Advice    Who Called: Patient  Best Call Back Number: [phone]  Additional Information: Patient left message on Friday, July 27th concerning Leave of Absence paperwork/has question/has not heard from anyone/please call patient back to advise.

## 2018-07-30 NOTE — TELEPHONE ENCOUNTER
Spoke with patient.  She was not see and did not drop off paperwork.  Patient took herself out of work.  Appointment made

## 2018-07-30 NOTE — TELEPHONE ENCOUNTER
----- Message from Mally Gibson sent at 7/30/2018 12:59 PM CDT -----  Contact: pt cell [phone]  Patient called several times since Friday she needs to be seen this Wednesday Thursday or   Friday she states this is for her paperwork and check up to go back to work. Please call back for appointment.

## 2018-08-01 ENCOUNTER — OFFICE VISIT (OUTPATIENT)
Dept: FAMILY MEDICINE | Facility: CLINIC | Age: 68
End: 2018-08-01
Payer: MEDICARE

## 2018-08-01 ENCOUNTER — DOCUMENTATION ONLY (OUTPATIENT)
Dept: FAMILY MEDICINE | Facility: CLINIC | Age: 68
End: 2018-08-01

## 2018-08-01 VITALS
TEMPERATURE: 98 F | SYSTOLIC BLOOD PRESSURE: 134 MMHG | DIASTOLIC BLOOD PRESSURE: 66 MMHG | BODY MASS INDEX: 35.25 KG/M2 | OXYGEN SATURATION: 97 % | WEIGHT: 191.56 LBS | HEIGHT: 62 IN | HEART RATE: 79 BPM | RESPIRATION RATE: 16 BRPM

## 2018-08-01 DIAGNOSIS — F41.9 ANXIETY: Primary | ICD-10-CM

## 2018-08-01 PROCEDURE — 99213 OFFICE O/P EST LOW 20 MIN: CPT | Mod: S$GLB,,, | Performed by: INTERNAL MEDICINE

## 2018-08-01 RX ORDER — SERTRALINE HYDROCHLORIDE 25 MG/1
25 TABLET, FILM COATED ORAL DAILY
Qty: 30 TABLET | Refills: 11 | Status: SHIPPED | OUTPATIENT
Start: 2018-08-01 | End: 2018-09-17

## 2018-08-01 NOTE — PROGRESS NOTES
"Subjective:       Patient ID: Syeda Gonsalves is a 67 y.o. female.    Chief Complaint: paperwork for work    HPI         CHIEF COMPLAINT: Depression: yes.  . Anxiety: yes.   HPI: thinks work is stressing her , paper work for 2 mo leave of abscense given    ONSET/TIMING:  .  ago.  Similar problems in past: .yes.   . # of episodes  of panic per week      0. .    DURATION:  constant    QUALITY/COURSE: unchanged    INTENSITY/SEVERITY: .Severity is #7 (10 point scale)    CONTEXT/WHEN:  Postpartum: no..  Personal loss: no ..  Stress: yes..    MODIFIERS/TREATMENTS: . Taking medications: yes.  Compliance with taking prescribed medications: yes.  alcohol abuse: no ...  Drug abuse: no .  Chronic disease: no.      The following symptoms are positive if BOLD, negative otherwise.        SYMPTOMS/RELATED: Possible medication side effects include:  dry mouth, decreased libido, impotence, GI disturbance, headache, insomnia, weight gain and weight loss.    REVIEW OF SYSTEMS: Sadness . Weakness . Fatigue . Lack_of _enjoyment_in_life . Sleep_disturbances . Anorexia .  Increased_appetite .  Irritability . Crying_spells .  Guilt .  Lost_concentration . Suicidal_ideation .   During panic attacks:  Chest_pain  . Shortness_of_breath  . dizziness . tingling . palpitations .    Review of Systems      Objective:      Vitals:    08/01/18 1403   BP: 134/66   Pulse: 79   Resp: 16   Temp: 98.2 °F (36.8 °C)   TempSrc: Oral   SpO2: 97%   Weight: 86.9 kg (191 lb 9.3 oz)   Height: 5' 2" (1.575 m)   PainSc: 0-No pain     Physical Exam   Constitutional: She appears well-developed and well-nourished.   Cardiovascular: Normal rate, regular rhythm and normal heart sounds.    Pulmonary/Chest: Effort normal and breath sounds normal.   Abdominal: Soft. There is no tenderness.   Neurological: She is alert.   Psychiatric: She has a normal mood and affect. Her behavior is normal. Thought content normal.   Nursing note and vitals reviewed.        Assessment: "       1. Anxiety          Plan:       Anxiety  -     sertraline (ZOLOFT) 25 MG tablet; Take 1 tablet (25 mg total) by mouth once daily.  Dispense: 30 tablet; Refill: 11      Follow-up in about 6 weeks (around 9/12/2018).

## 2018-08-01 NOTE — PROGRESS NOTES
Health Maintenance Due   Topic Date Due    Fecal Occult Blood Test (FOBT)/FitKit  01/22/2018    Influenza Vaccine  08/01/2018

## 2018-08-16 ENCOUNTER — OFFICE VISIT (OUTPATIENT)
Dept: FAMILY MEDICINE | Facility: CLINIC | Age: 68
End: 2018-08-16
Payer: MEDICARE

## 2018-08-16 VITALS
WEIGHT: 191.38 LBS | SYSTOLIC BLOOD PRESSURE: 135 MMHG | DIASTOLIC BLOOD PRESSURE: 75 MMHG | BODY MASS INDEX: 35.22 KG/M2 | HEART RATE: 65 BPM | HEIGHT: 62 IN

## 2018-08-16 DIAGNOSIS — E66.9 OBESITY, CLASS II, BMI 35-39.9: ICD-10-CM

## 2018-08-16 DIAGNOSIS — K58.9 IRRITABLE BOWEL SYNDROME, UNSPECIFIED TYPE: ICD-10-CM

## 2018-08-16 DIAGNOSIS — Z00.00 ENCOUNTER FOR PREVENTIVE HEALTH EXAMINATION: Primary | ICD-10-CM

## 2018-08-16 PROBLEM — R19.4 CHANGE IN BOWEL HABITS: Status: RESOLVED | Noted: 2017-12-21 | Resolved: 2018-08-16

## 2018-08-16 PROBLEM — R10.9 ABDOMINAL PAIN: Status: RESOLVED | Noted: 2018-07-18 | Resolved: 2018-08-16

## 2018-08-16 PROBLEM — E66.812 OBESITY, CLASS II, BMI 35-39.9: Status: ACTIVE | Noted: 2018-08-16

## 2018-08-16 PROCEDURE — 99999 PR PBB SHADOW E&M-EST. PATIENT-LVL IV: CPT | Mod: PBBFAC,,, | Performed by: NURSE PRACTITIONER

## 2018-08-16 PROCEDURE — G0439 PPPS, SUBSEQ VISIT: HCPCS | Mod: S$GLB,,, | Performed by: NURSE PRACTITIONER

## 2018-08-16 NOTE — Clinical Note
Primary Care Providers:Rikki Gaitan MD, MD (General)Your patient was seen today for a HRA visit. Gap(s) in care (HEDIS gaps) have been identified during this visit that require additional testing and possible follow up.No orders of the defined types were placed in this encounter.These orders were placed using [facility] approved protocol and any results will be forwarded to your office for appropriate follow up. I have included a copy of my visit note; please review the note and feel free to contact me with any questions. Thank you for allowing me to participate in the care of your patients.Zully Prajapati NP

## 2018-08-16 NOTE — PATIENT INSTRUCTIONS
Counseling and Referral of Other Preventative  (Italic type indicates deductible and co-insurance are waived)    Patient Name: Syeda Gonsalves  Today's Date: 8/16/2018    Health Maintenance       Date Due Completion Date    Fecal Occult Blood Test (FOBT)/FitKit 08/23/2019 (Originally 1/22/2018) 1/22/2017 (Done)    Override on 1/22/2017: Done (sent to scans,bg)    Lipid Panel 02/28/2019 2/28/2018    DEXA SCAN 02/01/2020 2/1/2017    Mammogram 06/07/2020 6/7/2018    TETANUS VACCINE 05/08/2024 5/8/2014        No orders of the defined types were placed in this encounter.    The following information is provided to all patients.  This information is to help you find resources for any of the problems found today that may be affecting your health:                Living healthy guide: www.[facility].[place].gov      Understanding Diabetes: www.diabetes.org      Eating healthy: www.cdc.gov/healthyweight      [facility] [place] home safety checklist: www.cdc.gov/steadi/patient.html      Agency on Aging: www.goea.[place].[facility]      Alcoholics anonymous (AA): www.aa.org      Physical Activity: www.[place].nih.gov/qf3qffg      Tobacco use: www.quitwithusla.org

## 2018-08-16 NOTE — PROGRESS NOTES
"Syeda Gonsalves presented for a  Medicare AWV and comprehensive Health Risk Assessment today. The following components were reviewed and updated:    · Medical history  · Family History  · Social history  · Allergies and Current Medications  · Health Risk Assessment  · Health Maintenance  · Care Team     ** See Completed Assessments for Annual Wellness Visit within the encounter summary.**       The following assessments were completed:  · Living Situation  · CAGE  · Depression Screening  · Timed Get Up and Go  · Whisper Test  · Cognitive Function Screening  · Nutrition Screening  · ADL Screening  · PAQ Screening    Vitals:    08/16/18 1118   BP: 135/75   Pulse: 65   Weight: 86.8 kg (191 lb 5.8 oz)   Height: 5' 2" (1.575 m)     Body mass index is 35 kg/m².  Physical Exam   Constitutional: She is oriented to person, place, and time. She appears well-developed.   HENT:   Head: Normocephalic and atraumatic.   Eyes: Pupils are equal, round, and reactive to light.   Neck: Normal range of motion.   Cardiovascular: Normal rate, regular rhythm and normal heart sounds.   No murmur heard.  Pulmonary/Chest: Effort normal and breath sounds normal. No respiratory distress. She has no decreased breath sounds. She has no wheezes. She has no rhonchi. She has no rales.   Abdominal: Soft. Bowel sounds are normal.   Musculoskeletal: Normal range of motion. She exhibits no edema.   Neurological: She is alert and oriented to person, place, and time. She exhibits normal muscle tone.   Skin: Skin is warm and dry. She is not diaphoretic. No pallor.   Psychiatric: She has a normal mood and affect. Her speech is normal and behavior is normal.   Nursing note and vitals reviewed.            Diagnoses and health risks identified today and associated recommendations/orders:    Encounter for preventive health examination    Obesity, Class II, BMI 35-39.9  Comments:  uncontrolled; encouraged diet and exercise    Irritable bowel syndrome, " unspecified type  Comments:  stable; continue to monitor          Provided Syeda with a 5-10 year written screening schedule and personal prevention plan. Recommendations were developed using the USPSTF age appropriate recommendations. Education, counseling, and referrals were provided as needed. After Visit Summary printed and given to patient which includes a list of additional screenings\tests needed.    F/U with PCP in 3-4 months    Zully Prajapati NP     Patient readiness: acceptance and barriers:none    During the course of the visit the patient was educated and counseled about the following:     Obesity:   Regular aerobic exercise program discussed.    Goals: Obesity: Reduce calorie intake and BMI    Did patient meet goals/outcomes: No    The following self management tools provided: declined    Patient Instructions (the written plan) was given to the patient/family.     Time spent with patient: 55 minutes    Barriers to medications present (no )    Adverse reactions to current medications (no)    Over the counter medications reviewed (Yes)

## 2018-08-31 DIAGNOSIS — E78.5 HYPERLIPIDEMIA, UNSPECIFIED HYPERLIPIDEMIA TYPE: Primary | ICD-10-CM

## 2018-09-10 ENCOUNTER — OFFICE VISIT (OUTPATIENT)
Dept: CARDIOLOGY | Facility: CLINIC | Age: 68
End: 2018-09-10
Payer: MEDICARE

## 2018-09-10 VITALS
OXYGEN SATURATION: 94 % | DIASTOLIC BLOOD PRESSURE: 75 MMHG | HEIGHT: 62 IN | SYSTOLIC BLOOD PRESSURE: 168 MMHG | BODY MASS INDEX: 34.64 KG/M2 | WEIGHT: 188.25 LBS | HEART RATE: 71 BPM

## 2018-09-10 DIAGNOSIS — Z82.49 FH: PREMATURE CORONARY HEART DISEASE: ICD-10-CM

## 2018-09-10 DIAGNOSIS — E78.5 HYPERLIPIDEMIA, UNSPECIFIED HYPERLIPIDEMIA TYPE: ICD-10-CM

## 2018-09-10 DIAGNOSIS — E66.9 OBESITY, CLASS II, BMI 35-39.9: ICD-10-CM

## 2018-09-10 DIAGNOSIS — R07.9 CHEST PAIN, UNSPECIFIED TYPE: Primary | ICD-10-CM

## 2018-09-10 PROCEDURE — 99204 OFFICE O/P NEW MOD 45 MIN: CPT | Mod: S$PBB,,, | Performed by: INTERNAL MEDICINE

## 2018-09-10 PROCEDURE — 93010 ELECTROCARDIOGRAM REPORT: CPT | Mod: S$PBB,,, | Performed by: INTERNAL MEDICINE

## 2018-09-10 PROCEDURE — 99999 PR PBB SHADOW E&M-EST. PATIENT-LVL IV: CPT | Mod: PBBFAC,,, | Performed by: INTERNAL MEDICINE

## 2018-09-10 PROCEDURE — 1101F PT FALLS ASSESS-DOCD LE1/YR: CPT | Mod: CPTII,,, | Performed by: INTERNAL MEDICINE

## 2018-09-10 PROCEDURE — 93005 ELECTROCARDIOGRAM TRACING: CPT | Mod: PBBFAC,PO | Performed by: INTERNAL MEDICINE

## 2018-09-10 PROCEDURE — 99214 OFFICE O/P EST MOD 30 MIN: CPT | Mod: PBBFAC,PO,25 | Performed by: INTERNAL MEDICINE

## 2018-09-10 NOTE — LETTER
September 11, 2018      Rikki Gaitan MD  [address]  [place] [address]           [facility] [facility] [facility]  [address]  [place] [address]  Phone: [phone]          Patient: Syeda Gonsalves   MR Number: [id number]   YOB: 1950   Date of Visit: 9/10/2018       Dear Dr. Rikki Gaitan:    Thank you for referring Syeda Gonsalves to me for evaluation. Attached you will find relevant portions of my assessment and plan of care.    If you have questions, please do not hesitate to call me. I look forward to following Syeda Gonsalves along with you.    Sincerely,    Tanner Aranda MD    Enclosure  CC:  No Recipients    If you would like to receive this communication electronically, please contact [email or web address] or [phone] to request more information on [employer] access.    For providers and/or their staff who would like to refer a patient to [facility], please contact us through our one-stop-shop provider referral line, [facility], at [phone].    If you feel you have received this communication in error or would no longer like to receive these types of communications, please e-mail [email or web address]

## 2018-09-10 NOTE — PROGRESS NOTES
[facility]    CC:  Chest pain  Chief Complaint   Patient presents with    Consult     Butt//CP       Patient ID: Syeda Gonsalves is a 67 y.o. female with a past medical history of hyperlipidemia, tobacco abuse, GERD  HPI  Patient has Significant past medical history of premature coronary artery disease in her family..  She has been having Central chest pain .  These are both at rest and exertion.  Not associated with shortness of breath.  She thought it is related to her esophagus because she has had it in the past.    Past Medical History:   Diagnosis Date    Abnormal Pap smear 1990    Arthritis     Asthma     Cervical cancer 1990    Had a complete hysterectomy    GERD (gastroesophageal reflux disease)     H/O head and neck radiation 1/11/2017    Hyperlipidemia     Kidney insufficiency     Multinodular goiter 1/11/2017    Smoking 1/11/2017     Past Surgical History:   Procedure Laterality Date    ADRENALECTOMY      CARPAL TUNNEL RELEASE      CHOLECYSTECTOMY      COLONOSCOPY N/A 12/21/2017    Procedure: COLONOSCOPY;  Surgeon: Harriett Pereyra MD;  Location: [facility];  Service: Endoscopy;  Laterality: N/A;    COLONOSCOPY N/A 12/21/2017    Performed by Harriett Pereyra MD at [facility]    EGD (ESOPHAGOGASTRODUODENOSCOPY) N/A 7/18/2018    Performed by [facility]ett [facility]ra [facility] at [facility] ENDO    ESOPHAGOGASTRODUODENOSCOPY N/A 7/18/2018    Procedure: EGD (ESOPHAGOGASTRODUODENOSCOPY);  Surgeon: Harriett Pereyra MD;  Location: [facility];  Service: Endoscopy;  Laterality: N/A;    HYSTERECTOMY      OOPHORECTOMY      TONSILLECTOMY       Social History     Socioeconomic History    Marital status:      Spouse name: Not on file    Number of children: Not on file    Years of education: Not on file    Highest education level: Not on file   Social Needs    Financial resource strain: Not on file    Food insecurity - worry: Not on file    Food insecurity - inability: Not on file     Transportation needs - medical: Not on file    Transportation needs - non-medical: Not on file   Occupational History    Not on file   Tobacco Use    Smoking status: Former Smoker     Packs/day: 0.25     Years: 20.00     Pack years: 5.00     Types: Cigarettes     Last attempt to quit: 2017     Years since quittin.7    Smokeless tobacco: Never Used   Substance and Sexual Activity    Alcohol use: No    Drug use: No    Sexual activity: Yes     Partners: Male   Other Topics Concern    Not on file   Social History Narrative    Not on file     Family History   Problem Relation Age of Onset    Heart disease Mother         MI    Heart disease Father         MI    Hyperlipidemia Brother     ANTHONY disease Daughter     Diabetes Son     ANTHONY disease Maternal Grandmother     Bladder Cancer Paternal Grandmother     Heart disease Paternal Grandfather         MI    Breast cancer Neg Hx     Colon cancer Neg Hx     Ovarian cancer Neg Hx     Cancer Neg Hx        Review of patient's allergies indicates:   Allergen Reactions    Flu vaccine  (36 mos+)(pf) Anaphylaxis    Iodine and iodide containing products     Lipitor [atorvastatin]     Niacin preparations     Welchol [colesevelam] Other (See Comments)     GI upset and insomnia    Zetia [ezetimibe] Nausea And Vomiting          Medication List           Accurate as of 9/10/18 11:59 PM. If you have any questions, ask your nurse or doctor.               CONTINUE taking these medications    albuterol 90 mcg/actuation inhaler  Inhale 2 puffs into the lungs every 6 (six) hours as needed for Wheezing.     cholestyramine 4 gram packet  Commonly known as:  QUESTRAN  Mix in water.  Take 3 times a day orally between meals     co-enzyme Q-10 30 mg capsule     COMBIVENT INHL     * diclofenac sodium 3 % gel  Commonly known as:  SOLARAZE  Apply every 6 hours when necessary to affected areas     * diclofenac sodium 1 % Gel  Commonly known as:  VOLTAREN  Apply 2  "g topically once daily.     ESTROVEN ORAL     fish oil-omega-3 fatty acids 300-1,000 mg capsule     GARLIQUE ORAL     montelukast 5 MG chewable tablet  Commonly known as:  SINGULAIR     MULTIPLE VITAMINS ORAL     nitroGLYCERIN 0.4 MG SL tablet  Commonly known as:  NITROSTAT  Place 1 tablet (0.4 mg total) under the tongue every 5 (five) minutes as needed for Chest pain.     pantoprazole 40 MG tablet  Commonly known as:  PROTONIX     ranitidine 150 MG tablet  Commonly known as:  Acid Control (raNITIdine)  Take 1 tablet (150 mg total) by mouth 2 (two) times daily.     sertraline 25 MG tablet  Commonly known as:  ZOLOFT  Take 1 tablet (25 mg total) by mouth once daily.     sucralfate 1 gram tablet  Commonly known as:  CARAFATE         * This list has 2 medication(s) that are the same as other medications prescribed for you. Read the directions carefully, and ask your doctor or other care provider to review them with you.            STOP taking these medications    aspirin 81 MG Chew  Stopped by:  Tanner Aranda MD     ondansetron 4 MG tablet  Commonly known as:  ZOFRAN  Stopped by:  Tanner Aranda MD            Review of Systems  Constitution: Denies chills, fever, and sweats.  HENT: Denies headaches or blurry vision.  Cardiovascular:  chest pain .  Respiratory: Denies cough or shortness of breath.  Gastrointestinal: Denies abdominal pain, nausea, or vomiting.  Musculoskeletal: Denies muscle cramps.  Neurological: Denies dizziness or focal weakness.  Psychiatric/Behavioral: Normal mental status.  Hematologic/Lymphatic: Denies bleeding problem or easy bruising/bleeding.  Skin: Denies rash or suspicious lesions    Physical Examination  BP (!) 168/75 (BP Location: Right arm, Patient Position: Sitting)   Pulse 71   Ht 5' 2" (1.575 m)   Wt 85.4 kg (188 lb 4.4 oz)   LMP 09/25/1990   SpO2 (!) 94%   BMI 34.44 kg/m²     Constitutional: No acute distress, conversant  HEENT: Sclera anicteric, Pupils equal, round and " reactive to light, extraocular motions intact, Oropharynx clear  Neck: No JVD, no carotid bruits  Cardiovascular: regular rate and rhythm, no murmur, rubs or gallops, normal S1/S2  Pulmonary: Clear to auscultation bilaterally  Abdominal: Abdomen soft, nontender, nondistended, positive bowel sounds  Extremities: No lower extremity edema,   Pulses:  Carotid pulses are 2+ on the right side, and 2+ on the left side.  Radial pulses are 2+ on the right side, and 2+ on the left side.   .    Skin: No ecchymosis, erythema, or ulcers  Psych: Alert and oriented x 3, appropriate affect  Neuro: CNII-XII intact, no focal deficits    Labs:  Most Recent Data  CBC:   Lab Results   Component Value Date    WBC 6.62 02/28/2018    HGB 14.4 02/28/2018    HCT 43.9 02/28/2018     02/28/2018    MCV 88 02/28/2018    RDW 12.7 02/28/2018     BMP:   Lab Results   Component Value Date     02/28/2018    K 4.2 02/28/2018     02/28/2018    CO2 30 (H) 02/28/2018    BUN 14 02/28/2018    CREATININE 0.8 02/28/2018    GLU 81 02/28/2018    CALCIUM 9.6 02/28/2018     LFTS;   Lab Results   Component Value Date    PROT 7.5 02/28/2018    ALBUMIN 3.7 02/28/2018    BILITOT 0.7 02/28/2018    AST 20 02/28/2018    ALKPHOS 71 02/28/2018    ALT 11 02/28/2018     COAGS: No results found for: INR, PROTIME, PTT  FLP:   Lab Results   Component Value Date    CHOL 213 (H) 02/28/2018    HDL 36 (L) 02/28/2018    LDLCALC 145.4 02/28/2018    TRIG 158 (H) 02/28/2018    CHOLHDL 16.9 (L) 02/28/2018     CARDIAC:   Lab Results   Component Value Date    BNP 83 10/01/2015       Imaging:    EKG:  Normal sinus rhythm. No significant ST T wave changes      I have personally reviewed these images and echo data    Assessment/Plan:  Syeda was seen today for consult.    Diagnoses and all orders for this visit:    Chest pain, unspecified type  -     Echocardiogram stress test with CFD; Future    Hyperlipidemia, unspecified hyperlipidemia type  -     EKG  12-lead    Obesity, Class II, BMI 35-39.9    FH: premature coronary heart disease       I went over her medications.  We would really assess her with a stress echocardiography given her presentation and risk factors        Tanner Aranda MD,MRCP,RPVI,FACC,FSCAI.  Interventional Cardiology   Phone [phone]

## 2018-09-11 PROBLEM — Z82.49 FH: PREMATURE CORONARY HEART DISEASE: Status: ACTIVE | Noted: 2018-09-11

## 2018-09-15 ENCOUNTER — NURSE TRIAGE (OUTPATIENT)
Dept: ADMINISTRATIVE | Facility: CLINIC | Age: 68
End: 2018-09-15

## 2018-09-15 NOTE — TELEPHONE ENCOUNTER
Reason for Disposition   Difficulty breathing    Protocols used: ST HEART RATE AND HEARTBEAT [id number]    Pt calling because she feels her heart is beating rapidly. Denies pain in chest but states it feels different. Also c/o SOB while laying flat. ED advised. Pt would like appt with PCP on Monday.

## 2018-09-17 ENCOUNTER — OFFICE VISIT (OUTPATIENT)
Dept: FAMILY MEDICINE | Facility: CLINIC | Age: 68
End: 2018-09-17
Payer: MEDICARE

## 2018-09-17 ENCOUNTER — TELEPHONE (OUTPATIENT)
Dept: FAMILY MEDICINE | Facility: CLINIC | Age: 68
End: 2018-09-17

## 2018-09-17 ENCOUNTER — DOCUMENTATION ONLY (OUTPATIENT)
Dept: FAMILY MEDICINE | Facility: CLINIC | Age: 68
End: 2018-09-17

## 2018-09-17 VITALS
TEMPERATURE: 98 F | HEIGHT: 62 IN | BODY MASS INDEX: 34.8 KG/M2 | OXYGEN SATURATION: 98 % | DIASTOLIC BLOOD PRESSURE: 76 MMHG | HEART RATE: 63 BPM | RESPIRATION RATE: 16 BRPM | SYSTOLIC BLOOD PRESSURE: 120 MMHG | WEIGHT: 189.13 LBS

## 2018-09-17 DIAGNOSIS — R00.2 PALPITATIONS: Primary | ICD-10-CM

## 2018-09-17 PROCEDURE — 93005 ELECTROCARDIOGRAM TRACING: CPT | Mod: S$GLB,,, | Performed by: INTERNAL MEDICINE

## 2018-09-17 PROCEDURE — 99214 OFFICE O/P EST MOD 30 MIN: CPT | Mod: S$GLB,,, | Performed by: INTERNAL MEDICINE

## 2018-09-17 PROCEDURE — 1101F PT FALLS ASSESS-DOCD LE1/YR: CPT | Mod: CPTII,S$GLB,, | Performed by: INTERNAL MEDICINE

## 2018-09-17 PROCEDURE — 93010 ELECTROCARDIOGRAM REPORT: CPT | Mod: S$GLB,,, | Performed by: INTERNAL MEDICINE

## 2018-09-17 NOTE — PATIENT INSTRUCTIONS
No caffeine.    See her cardiologist as scheduled in 1 week.    If you get a chest pain last more than 5 minutes taken nitroglycerin and some Mylanta.  If it lasts more than 20 minutes call 911

## 2018-09-17 NOTE — TELEPHONE ENCOUNTER
Spoke with patient.  Did not go to ED as recommended by on call nurse.  She thinks it is side effects from GERDS medications.  Appointment made for today.

## 2018-09-17 NOTE — TELEPHONE ENCOUNTER
----- Message from Brenden Bazzi sent at 9/17/2018  9:25 AM CDT -----  Type:  Patient Returning Call    Who Called:  Patient  Who Left Message for Patient:  ROSCOE MCWILLIAMS  Does the patient know what this is regarding?:  Appointment  Best Call Back Number:  [phone]

## 2018-09-17 NOTE — PROGRESS NOTES
"Subjective:       Patient ID: Syeda Gonsalves is a 67 y.o. female.    Chief Complaint: Chest Pain (pt says its the pantoprazole,she stopped it); Tachycardia; and leg cramps    HPI     Palpitations  CHIEF :COMPLAINT: palpitations, irregular.   HPI: rapid heart rate.   ONSET: 4 d  DURATION: continuous, .    QUALITY/COURSE: .      INTENSITY/SEVERITY: #  7     /10 (on 1 to 10 scale)  CONTEXT/WHEN:       The below section is positive for the patient ONLY if BOLDED:    MODIFIERS/TREATMENTS: Taking Medications: ? .     Compliant with Taking Prescribed Medications: ? yes  . Prior X-Rays: no  Prior Labs: no    Movements or activities that worsen the problem:   caffeine, decongestants, exercise, stress/anxiety, thyroid medication.    Movements, medications, or activities that ease the problem:  cough, Valsalva, digoxin, beta blocker, calcium channel blocker        SYMPTOMS/RELATED: chest pain, dizziness, a fluttering sensation, shortness of breath, a skipping sensation,syncope, abdominal pain,  leg swelling.           Review of Systems      Objective:      Vitals:    09/17/18 1640   BP: 120/76   Pulse: 63   Resp: 16   Temp: 98 °F (36.7 °C)   TempSrc: Oral   SpO2: 98%   Weight: 85.8 kg (189 lb 2.5 oz)   Height: 5' 2" (1.575 m)   PainSc: 0-No pain     Physical Exam   Constitutional: She appears well-developed and well-nourished.   Cardiovascular: Normal rate, regular rhythm and normal heart sounds.   Pulmonary/Chest: Effort normal and breath sounds normal.   Abdominal: Soft. There is no tenderness.   Neurological: She is alert.   Psychiatric: She has a normal mood and affect. Her behavior is normal. Thought content normal.   Nursing note and vitals reviewed.  ekg nl        Assessment:       1. Palpitations          Plan:       Palpitations  -     Comprehensive metabolic panel; Future; Expected date: 09/17/2018  -     CBC auto differential; Future; Expected date: 09/17/2018  -     Holter monitor - 24 hour; Future  -     TSH; " Future; Expected date: 09/17/2018  -     EKG 12-lead; Future      No Follow-up on file.

## 2018-09-18 ENCOUNTER — CLINICAL SUPPORT (OUTPATIENT)
Dept: FAMILY MEDICINE | Facility: CLINIC | Age: 68
End: 2018-09-18
Payer: MEDICARE

## 2018-09-18 DIAGNOSIS — E78.5 HYPERLIPIDEMIA, UNSPECIFIED HYPERLIPIDEMIA TYPE: ICD-10-CM

## 2018-09-18 DIAGNOSIS — R00.2 PALPITATIONS: ICD-10-CM

## 2018-09-18 LAB
ALBUMIN SERPL BCP-MCNC: 3.6 G/DL
ALBUMIN SERPL BCP-MCNC: 3.6 G/DL
ALP SERPL-CCNC: 61 U/L
ALP SERPL-CCNC: 61 U/L
ALT SERPL W/O P-5'-P-CCNC: 12 U/L
ALT SERPL W/O P-5'-P-CCNC: 12 U/L
ANION GAP SERPL CALC-SCNC: 10 MMOL/L
ANION GAP SERPL CALC-SCNC: 10 MMOL/L
AST SERPL-CCNC: 16 U/L
AST SERPL-CCNC: 16 U/L
BASOPHILS # BLD AUTO: 0 K/UL
BASOPHILS NFR BLD: 0.7 %
BILIRUB SERPL-MCNC: 0.6 MG/DL
BILIRUB SERPL-MCNC: 0.6 MG/DL
BUN SERPL-MCNC: 14 MG/DL
BUN SERPL-MCNC: 14 MG/DL
CALCIUM SERPL-MCNC: 9.6 MG/DL
CALCIUM SERPL-MCNC: 9.6 MG/DL
CHLORIDE SERPL-SCNC: 104 MMOL/L
CHLORIDE SERPL-SCNC: 104 MMOL/L
CHOLEST SERPL-MCNC: 218 MG/DL
CHOLEST/HDLC SERPL: 5.6 {RATIO}
CO2 SERPL-SCNC: 28 MMOL/L
CO2 SERPL-SCNC: 28 MMOL/L
CREAT SERPL-MCNC: 0.8 MG/DL
CREAT SERPL-MCNC: 0.8 MG/DL
DIFFERENTIAL METHOD: ABNORMAL
EOSINOPHIL # BLD AUTO: 0.2 K/UL
EOSINOPHIL NFR BLD: 3.4 %
ERYTHROCYTE [DISTWIDTH] IN BLOOD BY AUTOMATED COUNT: 12.9 %
EST. GFR  (AFRICAN AMERICAN): >60 ML/MIN/1.73 M^2
EST. GFR  (AFRICAN AMERICAN): >60 ML/MIN/1.73 M^2
EST. GFR  (NON AFRICAN AMERICAN): >60 ML/MIN/1.73 M^2
EST. GFR  (NON AFRICAN AMERICAN): >60 ML/MIN/1.73 M^2
GLUCOSE SERPL-MCNC: 90 MG/DL
GLUCOSE SERPL-MCNC: 90 MG/DL
HCT VFR BLD AUTO: 43.5 %
HDLC SERPL-MCNC: 39 MG/DL
HDLC SERPL: 17.9 %
HGB BLD-MCNC: 14.5 G/DL
LDLC SERPL CALC-MCNC: 148.4 MG/DL
LYMPHOCYTES # BLD AUTO: 1.4 K/UL
LYMPHOCYTES NFR BLD: 23.7 %
MCH RBC QN AUTO: 29.1 PG
MCHC RBC AUTO-ENTMCNC: 33.3 G/DL
MCV RBC AUTO: 87 FL
MONOCYTES # BLD AUTO: 0.6 K/UL
MONOCYTES NFR BLD: 10 %
NEUTROPHILS # BLD AUTO: 3.7 K/UL
NEUTROPHILS NFR BLD: 62.2 %
NONHDLC SERPL-MCNC: 179 MG/DL
PLATELET # BLD AUTO: 213 K/UL
PMV BLD AUTO: 8.8 FL
POTASSIUM SERPL-SCNC: 4.4 MMOL/L
POTASSIUM SERPL-SCNC: 4.4 MMOL/L
PROT SERPL-MCNC: 7.1 G/DL
PROT SERPL-MCNC: 7.1 G/DL
RBC # BLD AUTO: 4.97 M/UL
SODIUM SERPL-SCNC: 142 MMOL/L
SODIUM SERPL-SCNC: 142 MMOL/L
TRIGL SERPL-MCNC: 153 MG/DL
TSH SERPL DL<=0.005 MIU/L-ACNC: 1.62 UIU/ML
WBC # BLD AUTO: 5.9 K/UL

## 2018-09-18 PROCEDURE — 84443 ASSAY THYROID STIM HORMONE: CPT

## 2018-09-18 PROCEDURE — 80061 LIPID PANEL: CPT

## 2018-09-18 PROCEDURE — 85025 COMPLETE CBC W/AUTO DIFF WBC: CPT

## 2018-09-18 PROCEDURE — 80053 COMPREHEN METABOLIC PANEL: CPT

## 2018-09-20 ENCOUNTER — CLINICAL SUPPORT (OUTPATIENT)
Dept: CARDIOLOGY | Facility: CLINIC | Age: 68
End: 2018-09-20
Attending: INTERNAL MEDICINE
Payer: MEDICARE

## 2018-09-20 VITALS — BODY MASS INDEX: 34.8 KG/M2 | WEIGHT: 189.13 LBS | HEIGHT: 62 IN

## 2018-09-20 DIAGNOSIS — R07.9 CHEST PAIN, UNSPECIFIED TYPE: ICD-10-CM

## 2018-09-20 DIAGNOSIS — R00.2 PALPITATIONS: ICD-10-CM

## 2018-09-20 PROCEDURE — 93351 STRESS TTE COMPLETE: CPT | Mod: PBBFAC,PO | Performed by: INTERNAL MEDICINE

## 2018-09-20 PROCEDURE — 99211 OFF/OP EST MAY X REQ PHY/QHP: CPT | Mod: PBBFAC,PO

## 2018-09-20 PROCEDURE — 93226 XTRNL ECG REC<48 HR SCAN A/R: CPT | Mod: PBBFAC,PO | Performed by: INTERNAL MEDICINE

## 2018-09-20 PROCEDURE — 93227 XTRNL ECG REC<48 HR R&I: CPT | Mod: S$PBB,,, | Performed by: INTERNAL MEDICINE

## 2018-09-20 PROCEDURE — 99999 PR PBB SHADOW E&M-EST. PATIENT-LVL I: CPT | Mod: PBBFAC,,,

## 2018-09-24 LAB
ASCENDING AORTA: 2.03 CM
AV MEAN GRADIENT: 2.16 MMHG
AV PEAK GRADIENT: 4.33 MMHG
AV VALVE AREA: 1.89 CM2
BSA FOR ECHO PROCEDURE: 1.94 M2
CV ECHO LV RWT: 0.47 CM
CV STRESS BASE HR: 69
CVPEAKDIABP: 63
CVPEAKSYSBP: 151
CVRESTDIABP: 72
CVRESTSYSBP: 118
DOP CALC AO PEAK VEL: 1.04 M/S
DOP CALC AO VTI: 25.58 CM
DOP CALC LVOT AREA: 2.98 CM2
DOP CALC LVOT DIAMETER: 1.95 CM
DOP CALC LVOT STROKE VOLUME: 48.42 CM3
DOP CALCLVOT PEAK VEL VTI: 16.22 CM
E WAVE DECELERATION TIME: 199.34 MSEC
E/A RATIO: 1.25
E/E' RATIO: 12.71
ECHO LV POSTERIOR WALL: 0.86 CM (ref 0.6–1.1)
FRACTIONAL SHORTENING: 33 % (ref 28–44)
INTERVENTRICULAR SEPTUM: 0.91 CM (ref 0.6–1.1)
IVRT: 0.13 MSEC
LA MAJOR: 4.79 CM
LA MINOR: 4.65 CM
LA WIDTH: 3.19 CM
LEFT ATRIUM SIZE: 3.46 CM
LEFT ATRIUM VOLUME INDEX: 22.8 ML/M2
LEFT ATRIUM VOLUME: 44.27 CM3
LEFT INTERNAL DIMENSION IN SYSTOLE: 2.51 CM (ref 2.1–4)
LEFT VENTRICLE MASS INDEX: 50 G/M2
LEFT VENTRICULAR INTERNAL DIMENSION IN DIASTOLE: 3.76 CM (ref 3.5–6)
LEFT VENTRICULAR MASS: 97.07 G
LV LATERAL E/E' RATIO: 9.89
LV SEPTAL E/E' RATIO: 17.8
MV PEAK A VEL: 0.71 M/S
MV PEAK E VEL: 0.89 M/S
MV STENOSIS PRESSURE HALF TIME: 57.81 MS
MV VALVE AREA P 1/2 METHOD: 3.81 CM2
PISA TR MAX VEL: 2.07 M/S
PULM VEIN S/D RATIO: 1.46
PV PEAK D VEL: 0.39 M/S
PV PEAK S VEL: 0.57 M/S
RA MAJOR: 4.19 CM
RA PRESSURE: 3 MMHG
RA WIDTH: 2.55 CM
RIGHT VENTRICULAR END-DIASTOLIC DIMENSION: 2.3 CM
SINUS: 2.07 CM
STJ: 1.81 CM
STRESS ECHO POST ESTIMATED WORKLOAD: 6 METS
STRESS ECHO POST EXERCISE DUR MIN: 4 MIN
STRESS ECHO POST EXERCISE DUR SEC: 9
TDI LATERAL: 0.09
TDI SEPTAL: 0.05
TDI: 0.07
TR MAX PG: 17.14 MMHG
TRICUSPID ANNULAR PLANE SYSTOLIC EXCURSION: 0.02 CM
TV REST PULMONARY ARTERY PRESSURE: 20.14 MMHG

## 2018-09-27 ENCOUNTER — OFFICE VISIT (OUTPATIENT)
Dept: FAMILY MEDICINE | Facility: CLINIC | Age: 68
End: 2018-09-27
Payer: MEDICARE

## 2018-09-27 ENCOUNTER — DOCUMENTATION ONLY (OUTPATIENT)
Dept: FAMILY MEDICINE | Facility: CLINIC | Age: 68
End: 2018-09-27

## 2018-09-27 VITALS
SYSTOLIC BLOOD PRESSURE: 120 MMHG | WEIGHT: 188.94 LBS | BODY MASS INDEX: 34.77 KG/M2 | TEMPERATURE: 98 F | DIASTOLIC BLOOD PRESSURE: 70 MMHG | HEIGHT: 62 IN | RESPIRATION RATE: 16 BRPM | OXYGEN SATURATION: 98 % | HEART RATE: 66 BPM

## 2018-09-27 DIAGNOSIS — E78.5 HYPERLIPIDEMIA, UNSPECIFIED HYPERLIPIDEMIA TYPE: ICD-10-CM

## 2018-09-27 DIAGNOSIS — K21.9 GASTROESOPHAGEAL REFLUX DISEASE, ESOPHAGITIS PRESENCE NOT SPECIFIED: Primary | ICD-10-CM

## 2018-09-27 DIAGNOSIS — R00.2 PALPITATIONS: ICD-10-CM

## 2018-09-27 PROCEDURE — 1101F PT FALLS ASSESS-DOCD LE1/YR: CPT | Mod: CPTII,S$GLB,, | Performed by: INTERNAL MEDICINE

## 2018-09-27 PROCEDURE — 99213 OFFICE O/P EST LOW 20 MIN: CPT | Mod: S$GLB,,, | Performed by: INTERNAL MEDICINE

## 2018-09-27 NOTE — PROGRESS NOTES
Subjective:       Patient ID: Syeda Gonsalves is a 67 y.o. female.    Chief Complaint: Follow-up and esophageal dysphagia (discuss medications)    HPI     CHIEF COMPLAINT: Gastroesophageal Reflux Disease(+) follow up.  HPI: zantac not working.  She hour did not wean off of the Protonix.    ONSET/TIMING:   .    ago.       DURATION:    QUALITY/COURSE:  unchanged    LOCATION:   substernal /  epigastric    INTENSITY/SEVERITY:  .Severity is #   7     (10 point scale).      MODIFIERS/TREATMENTS:  .. Taking medications:      SYMPTOMS/RELATED:   . Possible medication side effects include :     The following symptoms/statements are positive if BOLD, negative otherwise.      CONTEXT/WHEN:  . Activity . Eating . Stooping . Lying .       REVIEW OF SYMPTOMS:  . Sharp_pain . Dull_pain . Burning_pain .Regurgitating_acid .  . Bloating .  Hematemesis . .  . Hematochezia . Melena . Flatulence . Steatorrhea . Acholic_Stools .  Weight_gain . Weight_Loss . Anorexia . Icterus . Jaundice .     The patient was having palpitations and was referred to her cardiologist.  Since that time she has had a stress echo.  She was taken off of the Protonix because she thought that was causing the palpitations.  Palpitations stopped after stopping protonix.         CHIEF COMPLAINT: Hyperlipidemia. cholesterol screening: no.   HPI:     ONSET:    MODIFIERS/TREATMENTS: . Taking medications: yes. . Non-compliance with following diet: no. .     SYMPTOMS/RELATED:Possible medication side effects include:   Myalgia: no.  .     REVIEW OF SYMPTOMS: past weights:   Wt Readings from Last 1 Encounters:   09/27/18 1040 85.7 kg (188 lb 15 oz)                                                     Last lipids: total   Lab Results   Component Value Date    CHOL 218 (H) 09/18/2018    CHOL 213 (H) 02/28/2018    CHOL 165 08/21/2017                                                                     HDL   Lab Results   Component Value Date    HDL 39 (L) 09/18/2018    HDL  "36 (L) 02/28/2018    HDL 36 (L) 08/21/2017                                                                     LDL   Lab Results   Component Value Date    LDLCALC 148.4 09/18/2018    LDLCALC 145.4 02/28/2018    LDLCALC 95.6 08/21/2017                                                                     TRIG   Lab Results   Component Value Date    TRIG 153 (H) 09/18/2018    TRIG 158 (H) 02/28/2018    TRIG 167 (H) 08/21/2017                                                                             Review of Systems      Objective:      Vitals:    09/27/18 1040   BP: 120/70   Pulse: 66   Resp: 16   Temp: 98.1 °F (36.7 °C)   TempSrc: Oral   SpO2: 98%   Weight: 85.7 kg (188 lb 15 oz)   Height: 5' 2" (1.575 m)   PainSc:   6   PainLoc: Back     Physical Exam   Constitutional: She appears well-developed and well-nourished.   Cardiovascular: Normal rate, regular rhythm and normal heart sounds.   Pulmonary/Chest: Effort normal and breath sounds normal.   Abdominal: Soft. There is no tenderness.   Neurological: She is alert.   Psychiatric: She has a normal mood and affect. Her behavior is normal. Thought content normal.   Nursing note and vitals reviewed.      The 10-year ASCVD risk score (Clarence JACINTA Jr., et al., 2013) is: 7.7%    Values used to calculate the score:      Age: 67 years      Sex: Female      Is Non- : No      Diabetic: No      Tobacco smoker: No      Systolic Blood Pressure: 120 mmHg      Is BP treated: No      HDL Cholesterol: 39 mg/dL      Total Cholesterol: 218 mg/dL    Assessment:       1. Gastroesophageal reflux disease, esophagitis presence not specified    2. Palpitations    3. Hyperlipidemia, unspecified hyperlipidemia type          Plan:       Gastroesophageal reflux disease, esophagitis presence not specified    Palpitations    Hyperlipidemia, unspecified hyperlipidemia type  -     Comprehensive metabolic panel; Future; Expected date: 09/27/2018  -     Lipid panel; Future; " Expected date: 09/27/2018      Follow-up in about 3 months (around 12/27/2018).

## 2018-09-27 NOTE — PATIENT INSTRUCTIONS
Restart the Protonix.  If you get the palpitations back again this time wean off by taking the Protonix every other day and taking the Zantac on the off days.  Do this for 1 week and then can try going to just the Zantac.    Lose 5 pounds.  Suggest [employer] the

## 2018-09-29 LAB
OHS CV HOLTER LENGTH DECIMAL HOURS: 23.98
OHS CV HOLTER LENGTH HOURS: 23
OHS CV HOLTER LENGTH MINUTES: 59

## 2018-10-02 ENCOUNTER — TELEPHONE (OUTPATIENT)
Dept: CARDIOLOGY | Facility: CLINIC | Age: 68
End: 2018-10-02

## 2018-10-02 NOTE — TELEPHONE ENCOUNTER
Called pt and stated that her ECHO looked fine and that it was no changes made that they would do anything about perez Hernandes the ECHO tech. Pt stated that Dr. Rikki Gaitan told her that from the looks of her Lab work that her heart was stiff and wants  Dr. Aranda to tell her what that means. Pt was also told that she would need to be on beta blockers and asked what is a beta blocker. Pt was told that a beta blocker is used to help with abnormal heart rhymes. Pt wants to know why would she need to on the beta blocker and wanted to know why Dr. Gaitan does not want to start her on it now. Pt was told that I would send a message and let her know when I have some answers for her. Pt verbalized understanding that It would not be until some time tomorrow when I call her back. No further issues noted.

## 2018-10-02 NOTE — TELEPHONE ENCOUNTER
----- Message from Beth Cortez sent at 10/2/2018 11:27 AM CDT -----  Contact: Syeda  Type:  Test Results    Who Called:  patient  Name of Test (Lab/Mammo/Etc):  Stress Test  Date of Test:  9/20/18  Ordering Provider:  Dr Mary Jane Aranda  Where the test was performed:  [facility]  Best Call Back Number:  [phone]  Additional Information:  na

## 2018-10-07 DIAGNOSIS — R10.13 EPIGASTRIC PAIN: ICD-10-CM

## 2018-10-09 ENCOUNTER — TELEPHONE (OUTPATIENT)
Dept: FAMILY MEDICINE | Facility: CLINIC | Age: 68
End: 2018-10-09

## 2018-10-09 NOTE — TELEPHONE ENCOUNTER
----- Message from Paula Melton sent at 10/9/2018 10:21 AM CDT -----  Contact: self  Type:  RX Refill Request    Who Called:  self  Refill or New Rx:  refill  RX Name and Strength:  Pantoprazole 40 Mg   How is the patient currently taking it? (ex. 1XDay):  1Xday  Is this a 30 day or 90 day RX:  30  Preferred Pharmacy with phone number:  [facility]  Local or Mail Order:  local  Ordering Provider:  Dr Arnie Corbett Call Back Number:  [phone]  Additional Information:  Patient states pharmacy has sent previous request. Please call patient. Thanks!    [facility] - [place], [place] - [address]  [address] [address] [facility] [address]  Phone: [phone] Fax: [phone]

## 2018-10-14 RX ORDER — PANTOPRAZOLE SODIUM 40 MG/1
TABLET, DELAYED RELEASE ORAL
Qty: 30 TABLET | Refills: 0 | Status: SHIPPED | OUTPATIENT
Start: 2018-10-14 | End: 2019-09-12 | Stop reason: SDUPTHER

## 2018-10-17 ENCOUNTER — TELEPHONE (OUTPATIENT)
Dept: FAMILY MEDICINE | Facility: CLINIC | Age: 68
End: 2018-10-17

## 2018-10-17 NOTE — TELEPHONE ENCOUNTER
----- Message from Yoni Fortune sent at 10/17/2018 10:01 AM CDT -----  Type:  Same Day Appointment Request    Caller is requesting a same day appointment.  Caller declined first available appointment listed below.      Name of Caller:  Self   When is the first available appointment? Symptoms:  Hip pain  Best Call Back Number:  [phone]  Additional Information:

## 2018-10-17 NOTE — TELEPHONE ENCOUNTER
Spoke with patient.  States she has had hip pain for over a month.  Denies fall or injury.  First available appointment made.  Placed on wait list.

## 2018-10-22 ENCOUNTER — OFFICE VISIT (OUTPATIENT)
Dept: FAMILY MEDICINE | Facility: CLINIC | Age: 68
End: 2018-10-22
Payer: MEDICARE

## 2018-10-22 VITALS
OXYGEN SATURATION: 98 % | SYSTOLIC BLOOD PRESSURE: 130 MMHG | RESPIRATION RATE: 16 BRPM | BODY MASS INDEX: 35.26 KG/M2 | TEMPERATURE: 99 F | WEIGHT: 191.63 LBS | DIASTOLIC BLOOD PRESSURE: 76 MMHG | HEIGHT: 62 IN | HEART RATE: 69 BPM

## 2018-10-22 DIAGNOSIS — M70.61 TROCHANTERIC BURSITIS OF RIGHT HIP: Primary | ICD-10-CM
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
## 2018-10-22 DIAGNOSIS — E78.5 HYPERLIPIDEMIA, UNSPECIFIED HYPERLIPIDEMIA TYPE: ICD-10-CM

## 2018-10-22 DIAGNOSIS — M54.2 NECK PAIN: ICD-10-CM

## 2018-10-22 PROCEDURE — 20610 DRAIN/INJ JOINT/BURSA W/O US: CPT | Mod: RT,S$GLB,, | Performed by: INTERNAL MEDICINE

## 2018-10-22 PROCEDURE — 99499 UNLISTED E&M SERVICE: CPT | Mod: S$GLB,,, | Performed by: INTERNAL MEDICINE

## 2018-10-22 PROCEDURE — 99213 OFFICE O/P EST LOW 20 MIN: CPT | Mod: 25,S$GLB,, | Performed by: INTERNAL MEDICINE

## 2018-10-22 PROCEDURE — 1101F PT FALLS ASSESS-DOCD LE1/YR: CPT | Mod: CPTII,S$GLB,, | Performed by: INTERNAL MEDICINE

## 2018-10-22 RX ORDER — METHYLPREDNISOLONE ACETATE 40 MG/ML
40 INJECTION, SUSPENSION INTRA-ARTICULAR; INTRALESIONAL; INTRAMUSCULAR; SOFT TISSUE
Status: DISCONTINUED | OUTPATIENT
Start: 2018-10-22 | End: 2018-10-24 | Stop reason: HOSPADM

## 2018-10-22 RX ADMIN — METHYLPREDNISOLONE ACETATE 40 MG: 40 INJECTION, SUSPENSION INTRA-ARTICULAR; INTRALESIONAL; INTRAMUSCULAR; SOFT TISSUE at 10:10
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
## 2018-10-22 NOTE — PROCEDURES
Large Joint Aspiration/Injection  Date/Time: 10/22/2018 10:51 AM  Performed by: Rikki Gaitan MD  Authorized by: Rikki Gaitan MD     Consent Done?:  Yes (Written)  Indications:  Pain  Timeout: Prior to procedure the correct patient, procedure, and site was verified    Prep: Patient was prepped and draped in usual sterile fashion    Medications:  40 mg methylPREDNISolone acetate 40 mg/mL  Patient tolerance:  Patient tolerated the procedure well with no immediate complications

## 2018-10-24 NOTE — PROGRESS NOTES
Patient, Syeda Gonsalves (MRN #[id number]), presented with a recorded BMI of 35.04 kg/m^2 and a documented comorbidity(s):  - Hyperlipidemia  to which the severe obesity is a contributing factor. This is consistent with the definition of severe obesity (BMI 35.0-35.9) with comorbidity (ICD-10 E66.01, Z68.35). The patient's severe obesity was monitored, evaluated, addressed and/or treated. This addendum to the medical record is made on 10/24/2018.

## 2018-11-13 ENCOUNTER — TELEPHONE (OUTPATIENT)
Dept: FAMILY MEDICINE | Facility: CLINIC | Age: 68
End: 2018-11-13

## 2018-11-13 NOTE — TELEPHONE ENCOUNTER
----- Message from Janeth Erickson sent at 11/13/2018  1:24 PM CST -----  Contact: Pt  Pt is returning callback says she need clearance for first of two surgeries to get the cataract removed    Pt is requesting a callback at [phone]    Thanks

## 2018-11-29 ENCOUNTER — OFFICE VISIT (OUTPATIENT)
Dept: FAMILY MEDICINE | Facility: CLINIC | Age: 68
End: 2018-11-29
Payer: MEDICARE

## 2018-11-29 ENCOUNTER — DOCUMENTATION ONLY (OUTPATIENT)
Dept: FAMILY MEDICINE | Facility: CLINIC | Age: 68
End: 2018-11-29

## 2018-11-29 VITALS
HEART RATE: 71 BPM | OXYGEN SATURATION: 97 % | SYSTOLIC BLOOD PRESSURE: 124 MMHG | DIASTOLIC BLOOD PRESSURE: 60 MMHG | TEMPERATURE: 98 F | HEIGHT: 62 IN | WEIGHT: 190.94 LBS | BODY MASS INDEX: 35.14 KG/M2 | RESPIRATION RATE: 16 BRPM

## 2018-11-29 DIAGNOSIS — Z12.11 COLON CANCER SCREENING: ICD-10-CM

## 2018-11-29 DIAGNOSIS — R30.0 DYSURIA: ICD-10-CM

## 2018-11-29 DIAGNOSIS — Z01.818 PRE-OP EVALUATION: Primary | ICD-10-CM

## 2018-11-29 LAB
BILIRUB SERPL-MCNC: ABNORMAL MG/DL
BLOOD URINE, POC: ABNORMAL
COLOR, POC UA: YELLOW
GLUCOSE UR QL STRIP: NORMAL
KETONES UR QL STRIP: ABNORMAL
LEUKOCYTE ESTERASE URINE, POC: ABNORMAL
NITRITE, POC UA: ABNORMAL
PH, POC UA: 6
PROTEIN, POC: ABNORMAL
SPECIFIC GRAVITY, POC UA: 1.01
UROBILINOGEN, POC UA: NORMAL

## 2018-11-29 PROCEDURE — 99213 OFFICE O/P EST LOW 20 MIN: CPT | Mod: 25,S$GLB,, | Performed by: NURSE PRACTITIONER

## 2018-11-29 PROCEDURE — 81002 URINALYSIS NONAUTO W/O SCOPE: CPT | Mod: S$GLB,,, | Performed by: NURSE PRACTITIONER

## 2018-11-29 RX ORDER — PHENAZOPYRIDINE HYDROCHLORIDE 200 MG/1
200 TABLET, FILM COATED ORAL 3 TIMES DAILY PRN
Qty: 30 TABLET | Refills: 1 | Status: SHIPPED | OUTPATIENT
Start: 2018-11-29 | End: 2018-12-02

## 2018-11-29 NOTE — PROGRESS NOTES
Subjective:       Patient ID: Syeda Gonsalves is a 67 y.o. female.    Chief Complaint: Cataract surgery clearance  Syeda Gonsalves is a 67 y.o. female who presents to the office today for a preoperative consultation at the request of surgeon Dr. Ghassan Knight who plans on performing left then right cataract surgery on December 4th then 18th. This consultation is requested for the specific conditions prompting preoperative evaluation (i.e. because of potential affect on operative risk): hyperlipidemia. Planned anesthesia: local. The patient has the following known anesthesia issues: none. Patient denies any chest pain, palpitations and shortness of breath. The patient has never had blood clots or pulmonary embolism.  Patients bleeding risk: no recent abnormal bleeding.The following portions of the patient's history were reviewed and updated as appropriate: allergies, current medications, past family history, past medical history, past social history, past surgical history and problem list.      Review of Systems  Pertinent items are noted in HPI.    Urinary Tract Infection    This is a recurrent problem. The current episode started more than 1 month ago. The problem occurs intermittently. The quality of the pain is described as burning. There has been no fever. She is sexually active. Pertinent negatives include no bubble bath use. She has tried increased fluids (pyridium) for the symptoms. The treatment provided mild relief.     Review of Systems    Objective:    U/A normal  Physical Exam   Constitutional: She is oriented to person, place, and time. She appears well-developed and well-nourished. No distress.   HENT:   Head: Normocephalic and atraumatic.   Eyes: Conjunctivae are normal. Right eye exhibits no discharge. Left eye exhibits no discharge. No scleral icterus.   Cardiovascular: Normal rate, regular rhythm and normal heart sounds. Exam reveals no gallop and no friction rub.   No murmur  heard.  Pulmonary/Chest: Effort normal and breath sounds normal. No respiratory distress. She has no wheezes. She has no rales.   Abdominal: Soft. Bowel sounds are normal. She exhibits no distension and no mass. There is no tenderness. There is no guarding.   Neurological: She is alert and oriented to person, place, and time.   Skin: Skin is warm and dry. She is not diaphoretic.   Psychiatric: She has a normal mood and affect. Her behavior is normal.   Nursing note and vitals reviewed.      Assessment:       1. Pre-op evaluation    2. Dysuria        Plan:       Pre-op evaluation    Dysuria  -     POCT urine dipstick without microscope    cleared for surgery

## 2018-12-07 ENCOUNTER — TELEPHONE (OUTPATIENT)
Dept: FAMILY MEDICINE | Facility: CLINIC | Age: 68
End: 2018-12-07

## 2018-12-07 NOTE — TELEPHONE ENCOUNTER
Had eye surgery.  Noted redness on face after using drops, states she also has rash on abd.  Instructed to call eye doctor.  Call back for appointment if needed.

## 2018-12-07 NOTE — TELEPHONE ENCOUNTER
----- Message from Artemio Lombardi sent at 12/7/2018 10:52 AM CST -----  Contact: same  Patient called in and stated she needed to know if she had to get the second series of her shingles shot soon?  Patient call back number is [phone]

## 2019-01-06 DIAGNOSIS — R10.13 EPIGASTRIC PAIN: ICD-10-CM

## 2019-01-18 ENCOUNTER — LAB VISIT (OUTPATIENT)
Dept: LAB | Facility: HOSPITAL | Age: 69
End: 2019-01-18
Attending: INTERNAL MEDICINE
Payer: MEDICARE

## 2019-01-18 DIAGNOSIS — E78.5 HYPERLIPIDEMIA, UNSPECIFIED HYPERLIPIDEMIA TYPE: ICD-10-CM

## 2019-01-18 LAB
ALBUMIN SERPL BCP-MCNC: 3.5 G/DL
ALP SERPL-CCNC: 65 U/L
ALT SERPL W/O P-5'-P-CCNC: 12 U/L
ANION GAP SERPL CALC-SCNC: 8 MMOL/L
AST SERPL-CCNC: 15 U/L
BILIRUB SERPL-MCNC: 0.5 MG/DL
BUN SERPL-MCNC: 14 MG/DL
CALCIUM SERPL-MCNC: 9.6 MG/DL
CHLORIDE SERPL-SCNC: 105 MMOL/L
CHOLEST SERPL-MCNC: 200 MG/DL
CHOLEST SERPL-MCNC: 200 MG/DL
CHOLEST/HDLC SERPL: 4.9 {RATIO}
CHOLEST/HDLC SERPL: 4.9 {RATIO}
CO2 SERPL-SCNC: 28 MMOL/L
CREAT SERPL-MCNC: 0.8 MG/DL
EST. GFR  (AFRICAN AMERICAN): >60 ML/MIN/1.73 M^2
EST. GFR  (NON AFRICAN AMERICAN): >60 ML/MIN/1.73 M^2
GLUCOSE SERPL-MCNC: 93 MG/DL
HDLC SERPL-MCNC: 41 MG/DL
HDLC SERPL-MCNC: 41 MG/DL
HDLC SERPL: 20.5 %
HDLC SERPL: 20.5 %
LDLC SERPL CALC-MCNC: 131.8 MG/DL
LDLC SERPL CALC-MCNC: 131.8 MG/DL
NONHDLC SERPL-MCNC: 159 MG/DL
NONHDLC SERPL-MCNC: 159 MG/DL
POTASSIUM SERPL-SCNC: 4.2 MMOL/L
PROT SERPL-MCNC: 7.2 G/DL
SODIUM SERPL-SCNC: 141 MMOL/L
TRIGL SERPL-MCNC: 136 MG/DL
TRIGL SERPL-MCNC: 136 MG/DL

## 2019-01-18 PROCEDURE — 80053 COMPREHEN METABOLIC PANEL: CPT

## 2019-01-18 PROCEDURE — 80061 LIPID PANEL: CPT

## 2019-01-18 PROCEDURE — 36415 COLL VENOUS BLD VENIPUNCTURE: CPT | Mod: PO

## 2019-02-08 PROCEDURE — 82274 ASSAY TEST FOR BLOOD FECAL: CPT | Mod: HCNC

## 2019-02-12 ENCOUNTER — LAB VISIT (OUTPATIENT)
Dept: LAB | Facility: HOSPITAL | Age: 69
End: 2019-02-12
Attending: NURSE PRACTITIONER
Payer: MEDICARE

## 2019-02-12 DIAGNOSIS — Z12.11 COLON CANCER SCREENING: ICD-10-CM

## 2019-02-12 LAB — HEMOCCULT STL QL IA: NEGATIVE

## 2019-02-25 ENCOUNTER — OFFICE VISIT (OUTPATIENT)
Dept: FAMILY MEDICINE | Facility: CLINIC | Age: 69
End: 2019-02-25
Payer: MEDICARE

## 2019-02-25 ENCOUNTER — DOCUMENTATION ONLY (OUTPATIENT)
Dept: FAMILY MEDICINE | Facility: CLINIC | Age: 69
End: 2019-02-25

## 2019-02-25 VITALS
HEIGHT: 62 IN | HEART RATE: 67 BPM | BODY MASS INDEX: 35.22 KG/M2 | RESPIRATION RATE: 16 BRPM | DIASTOLIC BLOOD PRESSURE: 86 MMHG | OXYGEN SATURATION: 99 % | WEIGHT: 191.38 LBS | SYSTOLIC BLOOD PRESSURE: 126 MMHG

## 2019-02-25 DIAGNOSIS — J30.2 SEASONAL ALLERGIC RHINITIS, UNSPECIFIED TRIGGER: Primary | ICD-10-CM

## 2019-02-25 DIAGNOSIS — E78.5 HYPERLIPIDEMIA, UNSPECIFIED HYPERLIPIDEMIA TYPE: ICD-10-CM

## 2019-02-25 PROCEDURE — 99213 OFFICE O/P EST LOW 20 MIN: CPT | Mod: S$GLB,,, | Performed by: INTERNAL MEDICINE

## 2019-02-25 PROCEDURE — 1101F PR PT FALLS ASSESS DOC 0-1 FALLS W/OUT INJ PAST YR: ICD-10-PCS | Mod: CPTII,S$GLB,, | Performed by: INTERNAL MEDICINE

## 2019-02-25 PROCEDURE — 99499 RISK ADDL DX/OHS AUDIT: ICD-10-PCS | Mod: S$GLB,,, | Performed by: INTERNAL MEDICINE

## 2019-02-25 PROCEDURE — 99213 PR OFFICE/OUTPT VISIT, EST, LEVL III, 20-29 MIN: ICD-10-PCS | Mod: S$GLB,,, | Performed by: INTERNAL MEDICINE

## 2019-02-25 PROCEDURE — 1101F PT FALLS ASSESS-DOCD LE1/YR: CPT | Mod: CPTII,S$GLB,, | Performed by: INTERNAL MEDICINE

## 2019-02-25 PROCEDURE — 99499 UNLISTED E&M SERVICE: CPT | Mod: S$GLB,,, | Performed by: INTERNAL MEDICINE

## 2019-02-25 RX ORDER — IPRATROPIUM BROMIDE 42 UG/1
2 SPRAY, METERED NASAL 4 TIMES DAILY
Qty: 15 ML | Refills: 5 | Status: SHIPPED | OUTPATIENT
Start: 2019-02-25 | End: 2020-02-27

## 2019-02-25 RX ORDER — FLUTICASONE PROPIONATE 50 MCG
2 SPRAY, SUSPENSION (ML) NASAL DAILY
Qty: 16 G | Refills: 11 | Status: SHIPPED | OUTPATIENT
Start: 2019-02-25 | End: 2022-08-16

## 2019-02-25 NOTE — PATIENT INSTRUCTIONS
Red yeast rice      Zyrtec at bedtime  Before using Flonase or the Atrovent nasal sprays wash nose out with saline.  Steam treatments

## 2019-02-25 NOTE — PROGRESS NOTES
Subjective:       Patient ID: Syeda Gonsalves is a 68 y.o. female.    Chief Complaint: Hyperlipidemia (labs) and Sinus Problem    HPI     CHIEF COMPLAINT: Sinusitis(+).  HPI:     ONSET/TIMING:  Onset    2 wks    ago. Similar problems in past: no. .   DURATION:   continuous    QUALITY/COURSE:    better    LOCATION:  Facial/Sinus pain: no. .     INTENSITY/SEVERITY: # 5 / 10 (on 1 to 10 scale)     The following symptoms/statements  are positive if BOLD, negative otherwise.      CONTEXT/WHEN: . Similar_problems.. Seasonal_pattern. Allergies/Hayfever.  Tobacco_use. .  Irritant_Exposure(smoke/dust/fumes). . Exposure_to_others_with_similar_symptoms. .               MODIFIERS/TREATMENTS: Antihistamines: . Rx-Nasal_Spray: . OTC Nasal Spray Use. Decongestants. . Antibiotics. -.      SYMPTOMS/RELATED:  Sinus_pain. .Rhinorrhea/Purulent. .    Dentalgia. Lymphadenopathy.  Swelling-Neck.           CHIEF COMPLAINT: Hyperlipidemia. cholesterol screening: no.   HPI:  Patient is allergic to statins and Zetia and cholestyramine    ONSET:    MODIFIERS/TREATMENTS: . Taking medications: no. . Non-compliance with following diet: no. .     SYMPTOMS/RELATED:Possible medication side effects include:   Myalgia: no.  .     REVIEW OF SYMPTOMS: past weights:   Wt Readings from Last 1 Encounters:   02/25/19 1032 86.8 kg (191 lb 5.8 oz)                                                     Last lipids: total   Lab Results   Component Value Date    CHOL 200 (H) 01/18/2019    CHOL 200 (H) 01/18/2019    CHOL 218 (H) 09/18/2018                                                                     HDL   Lab Results   Component Value Date    HDL 41 01/18/2019    HDL 41 01/18/2019    HDL 39 (L) 09/18/2018                                                                     LDL   Lab Results   Component Value Date    LDLCALC 131.8 01/18/2019    LDLCALC 131.8 01/18/2019    LDLCALC 148.4 09/18/2018                                                                 "     TRIG   Lab Results   Component Value Date    TRIG 136 01/18/2019    TRIG 136 01/18/2019    TRIG 153 (H) 09/18/2018                                                                         Review of Systems   Constitutional: Negative for chills and fever.   HENT: Positive for ear pain (Right sided with swallowing) and sinus pressure. Negative for nosebleeds, sneezing and sore throat.    Eyes: Negative for itching.   Musculoskeletal: Negative for neck pain.         Objective:      Vitals:    02/25/19 1032   BP: 126/86   Pulse: 67   Resp: 16   SpO2: 99%   Weight: 86.8 kg (191 lb 5.8 oz)   Height: 5' 2" (1.575 m)   PainSc:   4   PainLoc: Hip     Physical Exam   Constitutional: She appears well-developed and well-nourished.   HENT:   Right Ear: External ear normal.   Left Ear: External ear normal.   Mouth/Throat: Oropharynx is clear and moist. No oropharyngeal exudate.   Boggy pale turbinates on right only   Cardiovascular: Normal rate, regular rhythm and normal heart sounds. Exam reveals no friction rub.   No murmur heard.  Pulmonary/Chest: Effort normal and breath sounds normal. No respiratory distress. She has no wheezes. She has no rales. She exhibits no tenderness.   Abdominal: Soft. Bowel sounds are normal. There is no tenderness.   Musculoskeletal: She exhibits no edema.   Lymphadenopathy:     She has no cervical adenopathy.   Neurological: She is alert.   Psychiatric: She has a normal mood and affect. Her behavior is normal. Thought content normal.   Nursing note and vitals reviewed.      The 10-year ASCVD risk score (Clarence JACINTA Jr., et al., 2013) is: 8.9%    Values used to calculate the score:      Age: 68 years      Sex: Female      Is Non- : No      Diabetic: No      Tobacco smoker: No      Systolic Blood Pressure: 126 mmHg      Is BP treated: No      HDL Cholesterol: 41 mg/dL      Total Cholesterol: 200 mg/dL    Assessment:       1. Seasonal allergic rhinitis, unspecified trigger  "   2. Hyperlipidemia, unspecified hyperlipidemia type          Plan:       Seasonal allergic rhinitis, unspecified trigger  -     fluticasone (FLONASE) 50 mcg/actuation nasal spray; 2 sprays (100 mcg total) by Each Nare route once daily.  Dispense: 16 g; Refill: 11  -     ipratropium (ATROVENT) 42 mcg (0.06 %) nasal spray; 2 sprays by Nasal route 4 (four) times daily.  Dispense: 15 mL; Refill: 5    Hyperlipidemia, unspecified hyperlipidemia type      Follow-up in about 6 months (around 8/25/2019) for if you are not better return in one month.

## 2019-02-26 NOTE — PROGRESS NOTES
Patient, Syeda Gonsalves (MRN #[id number]), presented with a recorded BMI of 35 kg/m^2 and a documented comorbidity(s):  - Hyperlipidemia  to which the severe obesity is a contributing factor. This is consistent with the definition of severe obesity (BMI 35.0-39.9) with comorbidity (ICD-10 E66.01, Z68.35). The patient's severe obesity was monitored, evaluated, addressed and/or treated. This addendum to the medical record is made on 02/25/2019.

## 2019-03-07 ENCOUNTER — OFFICE VISIT (OUTPATIENT)
Dept: GASTROENTEROLOGY | Facility: CLINIC | Age: 69
End: 2019-03-07
Payer: MEDICARE

## 2019-03-07 VITALS
HEART RATE: 70 BPM | BODY MASS INDEX: 34.84 KG/M2 | WEIGHT: 190.5 LBS | SYSTOLIC BLOOD PRESSURE: 131 MMHG | DIASTOLIC BLOOD PRESSURE: 71 MMHG

## 2019-03-07 DIAGNOSIS — R53.83 OTHER FATIGUE: ICD-10-CM

## 2019-03-07 DIAGNOSIS — R41.9 UNSPECIFIED SYMPTOMS AND SIGNS INVOLVING COGNITIVE FUNCTIONS AND AWARENESS: ICD-10-CM

## 2019-03-07 DIAGNOSIS — R11.0 CHRONIC NAUSEA: Primary | ICD-10-CM

## 2019-03-07 PROCEDURE — 99213 PR OFFICE/OUTPT VISIT, EST, LEVL III, 20-29 MIN: ICD-10-PCS | Mod: S$GLB,,, | Performed by: INTERNAL MEDICINE

## 2019-03-07 PROCEDURE — 99999 PR PBB SHADOW E&M-EST. PATIENT-LVL III: ICD-10-PCS | Mod: PBBFAC,HCNC,, | Performed by: INTERNAL MEDICINE

## 2019-03-07 PROCEDURE — 1101F PT FALLS ASSESS-DOCD LE1/YR: CPT | Mod: CPTII,S$GLB,, | Performed by: INTERNAL MEDICINE

## 2019-03-07 PROCEDURE — 1101F PR PT FALLS ASSESS DOC 0-1 FALLS W/OUT INJ PAST YR: ICD-10-PCS | Mod: CPTII,S$GLB,, | Performed by: INTERNAL MEDICINE

## 2019-03-07 PROCEDURE — 99999 PR PBB SHADOW E&M-EST. PATIENT-LVL III: CPT | Mod: PBBFAC,HCNC,, | Performed by: INTERNAL MEDICINE

## 2019-03-07 PROCEDURE — 99213 OFFICE O/P EST LOW 20 MIN: CPT | Mod: S$GLB,,, | Performed by: INTERNAL MEDICINE

## 2019-03-07 NOTE — PROGRESS NOTES
[facility] Note    CC: Nausea    HPI 68 y.o. female presents for follow up of chronic, moderate nausea and epigastric pain, not associated with alarm symptoms. She had EGD July 2018 notable for gastritis and duodenitis (negative for H.pylori). At that time she was changed from Ranitidine to Pantoprazole with improvement in symptoms. If she takes Pantoprazole daily she feels very well, however, when she tries to wean off medication her nausea and pain return.     Past Medical History:   Diagnosis Date    Abnormal Pap smear 1990    Arthritis     Asthma     Cervical cancer 1990    Had a complete hysterectomy    GERD (gastroesophageal reflux disease)     H/O head and neck radiation 1/11/2017    Hyperlipidemia     Kidney insufficiency     Multinodular goiter 1/11/2017    Smoking 1/11/2017         Review of Systems  General ROS: negative for - chills, fever or weight loss  Cardiovascular ROS: no chest pain or dyspnea on exertion  Gastrointestinal ROS: + nausea, + abdominal pain, no hematemesis    Physical Examination  /71   Pulse 70   Wt 86.4 kg (190 lb 7.6 oz)   LMP 09/25/1990   BMI 34.84 kg/m²   General appearance: alert, cooperative, no distress  HENT: Normocephalic, atraumatic, neck symmetrical, no nasal discharge, sclera anicteric   Lungs: clear to auscultation bilaterally, symmetric chest wall expansion bilaterally  Heart: regular rate and rhythm without rub; no displacement of the PMI   Abdomen: soft, mild ttp in epigastrium and RUQ without rebound or guarding, BS normoactive  Extremities: extremities symmetric; no clubbing, cyanosis, or edema        Labs:  Lab Results   Component Value Date    WBC 5.90 09/18/2018    HGB 14.5 09/18/2018    HCT 43.5 09/18/2018    MCV 87 09/18/2018     09/18/2018         Assessment:   68 y.o. female presents for follow up of chronic intermittent nausea and abdominal pain with endoscopic findings of gastritis and duodenitis. Her symptoms are  resolved with daily PPI use, however she had questions regarding long term use of this medication.    Plan:  -Extensive discussion about chronic PPI use and possible side effects. We discussed that in her case, especially since she has labs monitored frequently as is compliant with bone help, that the benefit of daily PPI use outweighs the potential risk. She will intermittently have trials off medication and monitor symptoms. We discussed she may stop medication if she feels well.   -Check Vitamin B12 with next blood draw (intermittent fatigue, concern over affect of PPI on levels)  -RTC 6 months    Harriett Pereyra MD  [facility]  [address], [address]  [place] [address]  Office: [phone]  Fax: [phone]

## 2019-05-16 ENCOUNTER — PES CALL (OUTPATIENT)
Dept: ADMINISTRATIVE | Facility: CLINIC | Age: 69
End: 2019-05-16

## 2019-05-29 ENCOUNTER — TELEPHONE (OUTPATIENT)
Dept: FAMILY MEDICINE | Facility: CLINIC | Age: 69
End: 2019-05-29

## 2019-05-29 ENCOUNTER — NURSE TRIAGE (OUTPATIENT)
Dept: ADMINISTRATIVE | Facility: CLINIC | Age: 69
End: 2019-05-29

## 2019-05-29 ENCOUNTER — CLINICAL SUPPORT (OUTPATIENT)
Dept: URGENT CARE | Facility: CLINIC | Age: 69
End: 2019-05-29
Payer: MEDICARE

## 2019-05-29 VITALS
HEIGHT: 62 IN | OXYGEN SATURATION: 97 % | HEART RATE: 73 BPM | WEIGHT: 181.63 LBS | BODY MASS INDEX: 33.43 KG/M2 | SYSTOLIC BLOOD PRESSURE: 132 MMHG | DIASTOLIC BLOOD PRESSURE: 72 MMHG | TEMPERATURE: 99 F

## 2019-05-29 DIAGNOSIS — F41.8 SITUATIONAL ANXIETY: Primary | ICD-10-CM

## 2019-05-29 PROCEDURE — 99204 PR OFFICE/OUTPT VISIT, NEW, LEVL IV, 45-59 MIN: ICD-10-PCS | Mod: S$GLB,,, | Performed by: NURSE PRACTITIONER

## 2019-05-29 PROCEDURE — 99204 OFFICE O/P NEW MOD 45 MIN: CPT | Mod: S$GLB,,, | Performed by: NURSE PRACTITIONER

## 2019-05-29 RX ORDER — ALPRAZOLAM 0.25 MG/1
0.25 TABLET ORAL 3 TIMES DAILY PRN
Qty: 21 TABLET | Refills: 0 | Status: SHIPPED | OUTPATIENT
Start: 2019-05-29 | End: 2020-02-27 | Stop reason: ALTCHOICE

## 2019-05-29 NOTE — PROGRESS NOTES
"Subjective:       Patient ID: Syeda Gonsalves is a 68 y.o. female.    Vitals:  height is 5' 2" (1.575 m) and weight is 82.4 kg (181 lb 9.6 oz). Her oral temperature is 99 °F (37.2 °C). Her blood pressure is 132/72 and her pulse is 73. Her oxygen saturation is 97%.     Chief Complaint: Panic Attack    Patient reports feeling of anxiousness and reports she had a "bad panic attack last night." Patient reports she has been taking depression medication for the past week. Reports she has had these feelings since her boyfriend committed suicide 2 weeks ago. Crying. Denies SI/HI, chest pain. States she called her PCP Dr. Viveros today, but he is out of town for 1 week. States "I just need something to calm me down."      Constitution: Negative for chills, fatigue and fever.   HENT: Negative for congestion and sore throat.    Neck: Negative for painful lymph nodes.   Cardiovascular: Negative for chest pain and leg swelling.   Eyes: Negative for double vision and blurred vision.   Respiratory: Negative for cough and shortness of breath.    Gastrointestinal: Negative for nausea, vomiting and diarrhea.   Genitourinary: Negative for dysuria, frequency, urgency and history of kidney stones.   Musculoskeletal: Negative for joint pain, joint swelling, muscle cramps and muscle ache.   Skin: Negative for color change, pale, rash and bruising.   Allergic/Immunologic: Negative for seasonal allergies.   Neurological: Negative for dizziness, history of vertigo, light-headedness, passing out, headaches and disorientation.   Hematologic/Lymphatic: Negative for swollen lymph nodes.   Psychiatric/Behavioral: Positive for nervous/anxious and depression. Negative for disorientation, confusion, sleep disturbance, hallucinations, homicidal ideas, suicidal ideas, self-injury and substance abuse. The patient is nervous/anxious.        Objective:      Physical Exam   Constitutional: She is oriented to person, place, and time. Vital signs are " normal. She appears well-developed and well-nourished. She is cooperative.  Non-toxic appearance. She does not have a sickly appearance. She does not appear ill. No distress.   Crying.    HENT:   Head: Normocephalic and atraumatic.   Right Ear: Hearing, tympanic membrane, external ear and ear canal normal.   Left Ear: Hearing, tympanic membrane, external ear and ear canal normal.   Nose: Nose normal. No mucosal edema, rhinorrhea or nasal deformity. No epistaxis. Right sinus exhibits no maxillary sinus tenderness and no frontal sinus tenderness. Left sinus exhibits no maxillary sinus tenderness and no frontal sinus tenderness.   Mouth/Throat: Uvula is midline, oropharynx is clear and moist and mucous membranes are normal. No trismus in the jaw. Normal dentition. No uvula swelling. No posterior oropharyngeal erythema.   Eyes: Conjunctivae and lids are normal. Right eye exhibits no discharge. Left eye exhibits no discharge. No scleral icterus.   Sclera clear bilat   Neck: Trachea normal, normal range of motion, full passive range of motion without pain and phonation normal. Neck supple.   Cardiovascular: Normal rate, regular rhythm, normal heart sounds, intact distal pulses and normal pulses.   Pulmonary/Chest: Effort normal and breath sounds normal. No respiratory distress.   Abdominal: Soft. Normal appearance and bowel sounds are normal. She exhibits no distension, no pulsatile midline mass and no mass. There is no tenderness.   Musculoskeletal: Normal range of motion. She exhibits no edema or deformity.   Neurological: She is alert and oriented to person, place, and time. She exhibits normal muscle tone. Coordination normal.   Skin: Skin is warm, dry and intact. She is not diaphoretic. No pallor.   Psychiatric: She has a normal mood and affect. Her speech is normal and behavior is normal. Judgment and thought content normal. Cognition and memory are normal.   Nursing note and vitals reviewed.      Assessment:        1. Situational anxiety        Plan:       Denies SI/HI. Will refer to Psychiatry. Advised patient to go to ER for any feelings or thoughts of harming herself or other or feelings of hopelessness, verbalized understanding.   Situational anxiety  -     Ambulatory referral to Psychiatry    Other orders  -     ALPRAZolam (XANAX) 0.25 MG tablet; Take 1 tablet (0.25 mg total) by mouth 3 (three) times daily as needed for Anxiety.  Dispense: 21 tablet; Refill: 0

## 2019-05-29 NOTE — TELEPHONE ENCOUNTER
Spoke with patient to schedule, she would like to see Dr. Gaitan and is not willing to wait until there is an opening.  Patient will call to get an appointment with someone else.

## 2019-05-29 NOTE — PATIENT INSTRUCTIONS
Please go to the ER for any thought of harming yourself or others.       Anxiety Reaction  Anxiety is the feeling we all get when we think something bad might happen. It is a normal response to stress and usually causes only a mild reaction. When anxiety becomes more severe, it can interfere with daily life. In some cases, you may not even be aware of what it is youre anxious about. There may also be a genetic link or it may be a learned behavior in the home.  Both psychological and physical triggers cause stress reaction. It's often a response to fear or emotional stress, real or imagined. This stress may come from home, family, work, or social relationships.  During an anxiety reaction, you may feel:  · Helpless  · Nervous  · Depressed  · Irritable  Your body may show signs of anxiety in many ways. You may experience:  · Dry mouth  · Shakiness  · Dizziness  · Weakness  · Trouble breathing  · Breathing fast (hyperventilating)  · Chest pressure  · Sweating  · Headache  · Nausea  · Diarrhea  · Tiredness  · Inability to sleep  · Sexual problems  Home care  · Try to locate the sources of stress in your life. They may not be obvious. These may include:  ¨ Daily hassles of life (traffic jams, missed appointments, car troubles, etc.)  ¨ Major life changes, both good (new baby, job promotion) and bad (loss of job, loss of loved one)  ¨ Overload: feeling that you have too many responsibilities and can't take care of all of them at once  ¨ Feeling helpless, feeling that your problems are beyond what youre able to solve  · Notice how your body reacts to stress. Learn to listen to your body signals. This will help you take action before the stress becomes severe.  · When you can, do something about the source of your stress. (Avoid hassles, limit the amount of change that happens in your life at one time and take a break when you feel overloaded).  · Unfortunately, many stressful situations can't be avoided. It is necessary  to learn how to better manage stress. There are many proven methods that will reduce your anxiety. These include simple things like exercise, good nutrition and adequate rest. Also, there are certain techniques that are helpful:  ¨ Relaxation  ¨ Breathing exercises  ¨ Visualization  ¨ Biofeedback  ¨ Meditation  For more information about this, consult your doctor or go to a local bookstore and review the many books and tapes available on this subject.  Follow-up care  If you feel that your anxiety is not responding to self-help measures, contact your doctor or make an appointment with a counselor. You may need short-term psychological counseling and temporary medicine to help you manage stress.  Call 911  Call your healthcare provider right away if any of these occur:  · Trouble breathing  · Confusion  · Drowsiness or trouble wakening  · Fainting or loss of consciousness  · Rapid heart rate  · Seizure  · New chest pain that becomes more severe, lasts longer, or spreads into your shoulder, arm, neck, jaw, or back  When to seek medical advice  Call your healthcare provider right away if any of these occur:  · Your symptoms get worse  · Severe headache not relieved by rest and mild pain reliever  Date Last Reviewed: 9/29/2015  © 3037-7466 [employer]. [address] [place] [address], [place] [address]. All rights reserved. This information is not intended as a substitute for professional medical care. Always follow your healthcare professional's instructions.

## 2019-05-29 NOTE — TELEPHONE ENCOUNTER
Reason for Disposition   Patient sounds very sick or weak to the triager    Protocols used: ANXIETY AND PANIC ATTACK-A-OH    Patient states she has been having panic attacks. She lost a loved one to suicide and she lives alone. She states that nights are the hardest. She is on 25 mg PO Sertraline, but states she needs something stronger. She denies suicidal ideation and does not want to harm anyone else, but she states she feels like she is dying. She was calm throughout the conversation. Supportive communication given. Patient advised to go to the ED for help and she states she will get someone to bring her.

## 2019-05-29 NOTE — TELEPHONE ENCOUNTER
----- Message from Janeth Gill sent at 5/29/2019  9:15 AM CDT -----  Contact: Patient  Type: Needs Medical Advice    Who Called:  Patient  Best Call Back Number: [phone] (home) Additional Information: Patient she needs an appointment today, she is having bad nerve problems about a life event. Would like a call back.

## 2019-06-03 ENCOUNTER — TELEPHONE (OUTPATIENT)
Dept: FAMILY MEDICINE | Facility: CLINIC | Age: 69
End: 2019-06-03

## 2019-06-03 NOTE — TELEPHONE ENCOUNTER
----- Message from Yoni Fortune sent at 6/3/2019  1:27 PM CDT -----  Type:  Sooner Apoointment Request    Caller is requesting a sooner appointment.  Caller declined first available appointment listed below.  Caller will not accept being placed on the waitlist and is requesting a message be sent to doctor.    Name of Caller:  Self When is the first available appointment?  06/25/2019  Symptoms:   Best Call Back Number:  [phone] Additional Information:  Patient states a family member passed, asking to be seen to get rx for depression.

## 2019-06-04 ENCOUNTER — OFFICE VISIT (OUTPATIENT)
Dept: URGENT CARE | Facility: CLINIC | Age: 69
End: 2019-06-04
Payer: MEDICARE

## 2019-06-04 VITALS
SYSTOLIC BLOOD PRESSURE: 109 MMHG | BODY MASS INDEX: 33.31 KG/M2 | HEIGHT: 62 IN | OXYGEN SATURATION: 97 % | TEMPERATURE: 98 F | HEART RATE: 58 BPM | RESPIRATION RATE: 16 BRPM | DIASTOLIC BLOOD PRESSURE: 56 MMHG | WEIGHT: 181 LBS

## 2019-06-04 DIAGNOSIS — J20.9 ACUTE BRONCHITIS, UNSPECIFIED ORGANISM: Primary | ICD-10-CM

## 2019-06-04 DIAGNOSIS — H66.91 ACUTE OTITIS MEDIA, RIGHT: ICD-10-CM

## 2019-06-04 DIAGNOSIS — J02.9 ACUTE PHARYNGITIS, UNSPECIFIED ETIOLOGY: ICD-10-CM

## 2019-06-04 LAB
CTP QC/QA: YES
S PYO RRNA THROAT QL PROBE: NEGATIVE

## 2019-06-04 PROCEDURE — 96372 THER/PROPH/DIAG INJ SC/IM: CPT | Mod: S$GLB,,, | Performed by: NURSE PRACTITIONER

## 2019-06-04 PROCEDURE — 87880 STREP A ASSAY W/OPTIC: CPT | Mod: QW,S$GLB,, | Performed by: NURSE PRACTITIONER

## 2019-06-04 PROCEDURE — 99214 PR OFFICE/OUTPT VISIT, EST, LEVL IV, 30-39 MIN: ICD-10-PCS | Mod: 25,S$GLB,, | Performed by: NURSE PRACTITIONER

## 2019-06-04 PROCEDURE — 1101F PT FALLS ASSESS-DOCD LE1/YR: CPT | Mod: CPTII,S$GLB,, | Performed by: NURSE PRACTITIONER

## 2019-06-04 PROCEDURE — 96372 PR INJECTION,THERAP/PROPH/DIAG2ST, IM OR SUBCUT: ICD-10-PCS | Mod: S$GLB,,, | Performed by: NURSE PRACTITIONER

## 2019-06-04 PROCEDURE — 87880 POCT RAPID STREP A: ICD-10-PCS | Mod: QW,S$GLB,, | Performed by: NURSE PRACTITIONER

## 2019-06-04 PROCEDURE — 1101F PR PT FALLS ASSESS DOC 0-1 FALLS W/OUT INJ PAST YR: ICD-10-PCS | Mod: CPTII,S$GLB,, | Performed by: NURSE PRACTITIONER

## 2019-06-04 PROCEDURE — 71046 XR CHEST PA AND LATERAL: ICD-10-PCS | Mod: S$GLB,,, | Performed by: RADIOLOGY

## 2019-06-04 PROCEDURE — 71046 X-RAY EXAM CHEST 2 VIEWS: CPT | Mod: S$GLB,,, | Performed by: RADIOLOGY

## 2019-06-04 PROCEDURE — 99214 OFFICE O/P EST MOD 30 MIN: CPT | Mod: 25,S$GLB,, | Performed by: NURSE PRACTITIONER

## 2019-06-04 RX ORDER — ALBUTEROL SULFATE 90 UG/1
2 AEROSOL, METERED RESPIRATORY (INHALATION) EVERY 6 HOURS PRN
Qty: 18 G | Refills: 0 | Status: SHIPPED | OUTPATIENT
Start: 2019-06-04 | End: 2022-11-22

## 2019-06-04 RX ORDER — DEXAMETHASONE SODIUM PHOSPHATE 100 MG/10ML
8 INJECTION INTRAMUSCULAR; INTRAVENOUS
Status: COMPLETED | OUTPATIENT
Start: 2019-06-04 | End: 2019-06-04

## 2019-06-04 RX ORDER — DOXYCYCLINE 100 MG/1
100 CAPSULE ORAL EVERY 12 HOURS
Qty: 20 CAPSULE | Refills: 0 | Status: SHIPPED | OUTPATIENT
Start: 2019-06-04 | End: 2019-06-14

## 2019-06-04 RX ADMIN — DEXAMETHASONE SODIUM PHOSPHATE 8 MG: 100 INJECTION INTRAMUSCULAR; INTRAVENOUS at 12:06

## 2019-06-04 NOTE — PATIENT INSTRUCTIONS
Bronchitis, Antibiotic Treatment (Adult)    Bronchitis is an infection of the air passages (bronchial tubes) in your lungs. It often occurs when you have a cold. This illness is contagious during the first few days and is spread through the air by coughing and sneezing, or by direct contact (touching the sick person and then touching your own eyes, nose, or mouth).  Symptoms of bronchitis include cough with mucus (phlegm) and low-grade fever. Bronchitis usually lasts 7 to 14 days. Mild cases can be treated with simple home remedies. More severe infection is treated with an antibiotic.  Home care  Follow these guidelines when caring for yourself at home:  · If your symptoms are severe, rest at home for the first 2 to 3 days. When you go back to your usual activities, don't let yourself get too tired.  · Do not smoke. Also avoid being exposed to secondhand smoke.  · You may use over-the-counter medicines to control fever or pain, unless another medicine was prescribed. (Note: If you have chronic liver or kidney disease or have ever had a stomach ulcer or gastrointestinal bleeding, talk with your healthcare provider before using these medicines. Also talk to your provider if you are taking medicine to prevent blood clots.) Aspirin should never be given to anyone younger than 18 years of age who is ill with a viral infection or fever. It may cause severe liver or brain damage.  · Your appetite may be poor, so a light diet is fine. Avoid dehydration by drinking 6 to 8 glasses of fluids per day (such as water, soft drinks, sports drinks, juices, tea, or soup). Extra fluids will help loosen secretions in the nose and lungs.  · Over-the-counter cough, cold, and sore-throat medicines will not shorten the length of the illness, but they may be helpful to reduce symptoms. (Note: Do not use decongestants if you have high blood pressure.)  · Finish all antibiotic medicine. Do this even if you are feeling better after only a  few days.  Follow-up care  Follow up with your healthcare provider, or as advised. If you had an X-ray or ECG (electrocardiogram), a specialist will review it. You will be notified of any new findings that may affect your care.  Note: If you are age 65 or older, or if you have a chronic lung disease or condition that affects your immune system, or you smoke, talk to your healthcare provider about having pneumococcal vaccinations and a yearly influenza vaccination (flu shot).  When to seek medical advice  Call your healthcare provider right away if any of these occur:  · Fever of 100.4°F (38°C) or higher  · Coughing up increased amounts of colored sputum  · Weakness, drowsiness, headache, facial pain, ear pain, or a stiff neck  Call 911, or get immediate medical care  Contact emergency services right away if any of these occur.  · Coughing up blood  · Worsening weakness, drowsiness, headache, or stiff neck  · Trouble breathing, wheezing, or pain with breathing  Date Last Reviewed: 9/13/2015 © 2000-2017 [employer]. [address] [place] [address]. All rights reserved. This information is not intended as a substitute for professional medical care. Always follow your healthcare professional's instructions.      1.  Take all medications as directed. If you have been prescribed antibiotics, make sure to complete them.   2.  Make sure to get plenty of rest. (This is the best way to help your immune system fight illness.)   3.  Keep yourself/patient well hydrated. For adults, it is recommended to drink at least 8-10 glasses of water daily.   4.  Perform warm, salt water gargles to help reduce inflammation and throat discomfort. Chloraseptic sprays and throat lozenges will also help with your throat pain.  5. Change your toothbrush 24 hours after starting antibiotics to prevent reinfection.  6.  For patients above 6 months of age who are not allergic to and are not on anticoagulants, you can  alternate Tylenol and Motrin every 4-6 hours for fever above 100.4F and/or pain.  For patients less than 6 months of age, allergic to or intolerant to NSAIDS, have gastritis, gastric ulcers, or history of GI bleeds, are pregnant, or are on anticoagulant therapy, you can take Tylenol every 4 hours as needed for fever above 100.4F and/or pain.   7. You should schedule a follow-up appointment with your Primary Care Provider for recheck in 2-3 days or as directed at this visit.   8.  If your condition fails to improve in a timely manner, you should receive another evaluation by your Primary Care Provider to discuss your concerns or return to urgent care for a recheck.      If your condition worsens at any time, you should report immediately to your nearest Emergency Department for further evaluation. **You must understand that you have received Urgent Care treatment only and that you may be released before all of your medical problems are known or treated. You, the patient, are responsible to arrange for follow-up care as instructed.

## 2019-06-04 NOTE — PROGRESS NOTES
"Subjective:       Patient ID: Syeda Gonsalves is a 68 y.o. female.    Vitals:  height is 5' 2" (1.575 m) and weight is 82.1 kg (181 lb). Her temperature is 98.2 °F (36.8 °C). Her blood pressure is 109/56 (abnormal) and her pulse is 58 (abnormal). Her respiration is 16 and oxygen saturation is 97%.     Chief Complaint: Cough    69 y/o female presents with worsening cough and congestion.  Symptoms began about 1 month ago and worsening over the past week. Initially coughing up yellow sputum but now difficult to cough up and having tightness in chest from congestion. Reports associated sore throat and wants to be checked for strep.  States she is also under a lot of stress.  Her boyfriend recently committed suicide. She is under the care of mental health for depression.  PMH includes hospitalization for asthma exacerbation and severe bronchitis.      Cough   This is a recurrent problem. The current episode started more than 1 month ago. The cough is productive of purulent sputum. Associated symptoms include ear pain (AD), headaches, a sore throat and shortness of breath. Pertinent negatives include no chest pain, chills, eye redness, fever, hemoptysis, myalgias, rash or wheezing. She has tried OTC cough suppressant (nasal spray ) for the symptoms. The treatment provided no relief. Her past medical history is significant for asthma, bronchitis and pneumonia.       Constitution: Negative for chills, sweating, fatigue and fever.   HENT: Positive for ear pain (AD), congestion and sore throat. Negative for sinus pain, sinus pressure and voice change.    Neck: Positive for painful lymph nodes (right side).   Cardiovascular: Negative for chest pain and palpitations.   Eyes: Negative for eye discharge and eye redness.   Respiratory: Positive for chest tightness, cough (chest congestion), sputum production, shortness of breath and asthma. Negative for bloody sputum, COPD, stridor and wheezing.    Gastrointestinal: Negative " for nausea and vomiting.   Genitourinary: Negative for dysuria, frequency and urgency.   Musculoskeletal: Negative for joint pain, abnormal ROM of joint and muscle ache.   Skin: Negative for rash and hives.   Allergic/Immunologic: Positive for asthma. Negative for seasonal allergies and hives.   Neurological: Positive for headaches. Negative for light-headedness, passing out, disorientation and altered mental status.   Hematologic/Lymphatic: Positive for swollen lymph nodes (right side).   Psychiatric/Behavioral: Positive for depression (patient's boyfriend recently commited suicide ). Negative for altered mental status, disorientation and confusion.       Objective:      Physical Exam   Constitutional: She is oriented to person, place, and time. She appears well-developed and well-nourished. She is cooperative.  Non-toxic appearance. She does not appear ill. No distress.   HENT:   Head: Normocephalic and atraumatic.   Right Ear: Hearing, external ear and ear canal normal. Tympanic membrane is erythematous and bulging. A middle ear effusion is present.   Left Ear: Hearing, external ear and ear canal normal. A middle ear effusion is present.   Nose: Mucosal edema present. No rhinorrhea or nasal deformity. No epistaxis. Right sinus exhibits maxillary sinus tenderness and frontal sinus tenderness. Left sinus exhibits no maxillary sinus tenderness and no frontal sinus tenderness.   Mouth/Throat: Uvula is midline and mucous membranes are normal. No trismus in the jaw. Normal dentition. No uvula swelling. No posterior oropharyngeal erythema.   Eyes: Conjunctivae and lids are normal. No scleral icterus.   Sclera clear bilat   Neck: Trachea normal, full passive range of motion without pain and phonation normal. Neck supple.   Cardiovascular: Normal rate, regular rhythm, normal heart sounds, intact distal pulses and normal pulses.   Pulmonary/Chest: Effort normal and breath sounds normal. No respiratory distress. She has no  decreased breath sounds. She has no wheezes. She has no rhonchi. She has no rales.   Abdominal: Soft. Normal appearance and bowel sounds are normal. She exhibits no distension. There is no tenderness.   Musculoskeletal: Normal range of motion. She exhibits no edema or deformity.   Neurological: She is alert and oriented to person, place, and time. She exhibits normal muscle tone. Coordination normal.   Skin: Skin is warm, dry and intact. She is not diaphoretic. No pallor.   Psychiatric: She has a normal mood and affect. Her speech is normal and behavior is normal. Judgment and thought content normal. Cognition and memory are normal.   Nursing note and vitals reviewed.      Assessment:       1. Acute bronchitis, unspecified organism    2. Acute otitis media, right    3. Acute pharyngitis, unspecified etiology        Plan:         Acute bronchitis, unspecified organism  -     X-Ray Chest PA And Lateral; Future; Expected date: 06/04/2019  -     dexamethasone injection 8 mg  -     doxycycline (VIBRAMYCIN) 100 MG Cap; Take 1 capsule (100 mg total) by mouth every 12 (twelve) hours. for 10 days  Dispense: 20 capsule; Refill: 0  -     albuterol (VENTOLIN HFA) 90 mcg/actuation inhaler; Inhale 2 puffs into the lungs every 6 (six) hours as needed for Wheezing. Rescue  Dispense: 18 g; Refill: 0    Acute otitis media, right  -     dexamethasone injection 8 mg  -     doxycycline (VIBRAMYCIN) 100 MG Cap; Take 1 capsule (100 mg total) by mouth every 12 (twelve) hours. for 10 days  Dispense: 20 capsule; Refill: 0    Acute pharyngitis, unspecified etiology  -     POCT rapid strep A  -     dexamethasone injection 8 mg  -     doxycycline (VIBRAMYCIN) 100 MG Cap; Take 1 capsule (100 mg total) by mouth every 12 (twelve) hours. for 10 days  Dispense: 20 capsule; Refill: 0    X-ray Chest Pa And Lateral    Result Date: 6/4/2019  EXAMINATION: XR CHEST PA AND LATERAL CLINICAL HISTORY: Acute bronchitis, unspecified FINDINGS: Heart mediastinum  are normal lungs are clear and the bones show no abnormality.  There are clips right upper quadrant.     No acute process seen. Electronically signed by: Norm Jones MD Date:    06/04/2019 Time:    12:24      Results for orders placed or performed in visit on 06/04/19   POCT rapid strep A   Result Value Ref Range    Rapid Strep A Screen Negative Negative     Acceptable Yes      Patient Instructions     Bronchitis, Antibiotic Treatment (Adult)    Bronchitis is an infection of the air passages (bronchial tubes) in your lungs. It often occurs when you have a cold. This illness is contagious during the first few days and is spread through the air by coughing and sneezing, or by direct contact (touching the sick person and then touching your own eyes, nose, or mouth).  Symptoms of bronchitis include cough with mucus (phlegm) and low-grade fever. Bronchitis usually lasts 7 to 14 days. Mild cases can be treated with simple home remedies. More severe infection is treated with an antibiotic.  Home care  Follow these guidelines when caring for yourself at home:  · If your symptoms are severe, rest at home for the first 2 to 3 days. When you go back to your usual activities, don't let yourself get too tired.  · Do not smoke. Also avoid being exposed to secondhand smoke.  · You may use over-the-counter medicines to control fever or pain, unless another medicine was prescribed. (Note: If you have chronic liver or kidney disease or have ever had a stomach ulcer or gastrointestinal bleeding, talk with your healthcare provider before using these medicines. Also talk to your provider if you are taking medicine to prevent blood clots.) Aspirin should never be given to anyone younger than 18 years of age who is ill with a viral infection or fever. It may cause severe liver or brain damage.  · Your appetite may be poor, so a light diet is fine. Avoid dehydration by drinking 6 to 8 glasses of fluids per day (such as  water, soft drinks, sports drinks, juices, tea, or soup). Extra fluids will help loosen secretions in the nose and lungs.  · Over-the-counter cough, cold, and sore-throat medicines will not shorten the length of the illness, but they may be helpful to reduce symptoms. (Note: Do not use decongestants if you have high blood pressure.)  · Finish all antibiotic medicine. Do this even if you are feeling better after only a few days.  Follow-up care  Follow up with your healthcare provider, or as advised. If you had an X-ray or ECG (electrocardiogram), a specialist will review it. You will be notified of any new findings that may affect your care.  Note: If you are age 65 or older, or if you have a chronic lung disease or condition that affects your immune system, or you smoke, talk to your healthcare provider about having pneumococcal vaccinations and a yearly influenza vaccination (flu shot).  When to seek medical advice  Call your healthcare provider right away if any of these occur:  · Fever of 100.4°F (38°C) or higher  · Coughing up increased amounts of colored sputum  · Weakness, drowsiness, headache, facial pain, ear pain, or a stiff neck  Call 911, or get immediate medical care  Contact emergency services right away if any of these occur.  · Coughing up blood  · Worsening weakness, drowsiness, headache, or stiff neck  · Trouble breathing, wheezing, or pain with breathing  Date Last Reviewed: 9/13/2015  © 7979-1452 [employer] [address] [place] [address]. All rights reserved. This information is not intended as a substitute for professional medical care. Always follow your healthcare professional's instructions.      1.  Take all medications as directed. If you have been prescribed antibiotics, make sure to complete them.   2.  Make sure to get plenty of rest. (This is the best way to help your immune system fight illness.)   3.  Keep yourself/patient well hydrated. For adults, it is  recommended to drink at least 8-10 glasses of water daily.   4.  Perform warm, salt water gargles to help reduce inflammation and throat discomfort. Chloraseptic sprays and throat lozenges will also help with your throat pain.  5. Change your toothbrush 24 hours after starting antibiotics to prevent reinfection.  6.  For patients above 6 months of age who are not allergic to and are not on anticoagulants, you can alternate Tylenol and Motrin every 4-6 hours for fever above 100.4F and/or pain.  For patients less than 6 months of age, allergic to or intolerant to NSAIDS, have gastritis, gastric ulcers, or history of GI bleeds, are pregnant, or are on anticoagulant therapy, you can take Tylenol every 4 hours as needed for fever above 100.4F and/or pain.   7. You should schedule a follow-up appointment with your Primary Care Provider for recheck in 2-3 days or as directed at this visit.   8.  If your condition fails to improve in a timely manner, you should receive another evaluation by your Primary Care Provider to discuss your concerns or return to urgent care for a recheck.      If your condition worsens at any time, you should report immediately to your nearest Emergency Department for further evaluation. **You must understand that you have received Urgent Care treatment only and that you may be released before all of your medical problems are known or treated. You, the patient, are responsible to arrange for follow-up care as instructed.

## 2019-06-06 ENCOUNTER — TELEPHONE (OUTPATIENT)
Dept: URGENT CARE | Facility: CLINIC | Age: 69
End: 2019-06-06

## 2019-06-07 ENCOUNTER — TELEPHONE (OUTPATIENT)
Dept: FAMILY MEDICINE | Facility: CLINIC | Age: 69
End: 2019-06-07

## 2019-06-07 ENCOUNTER — DOCUMENTATION ONLY (OUTPATIENT)
Dept: FAMILY MEDICINE | Facility: CLINIC | Age: 69
End: 2019-06-07

## 2019-06-07 ENCOUNTER — HOSPITAL ENCOUNTER (OUTPATIENT)
Dept: RADIOLOGY | Facility: HOSPITAL | Age: 69
Discharge: HOME | End: 2019-06-07
Attending: INTERNAL MEDICINE
Payer: MEDICARE

## 2019-06-07 ENCOUNTER — OFFICE VISIT (OUTPATIENT)
Dept: FAMILY MEDICINE | Facility: CLINIC | Age: 69
End: 2019-06-07
Payer: MEDICARE

## 2019-06-07 VITALS
SYSTOLIC BLOOD PRESSURE: 134 MMHG | WEIGHT: 183.63 LBS | DIASTOLIC BLOOD PRESSURE: 76 MMHG | RESPIRATION RATE: 16 BRPM | TEMPERATURE: 98 F | HEIGHT: 62 IN | OXYGEN SATURATION: 97 % | BODY MASS INDEX: 33.79 KG/M2 | HEART RATE: 67 BPM

## 2019-06-07 DIAGNOSIS — R51.9 NONINTRACTABLE HEADACHE, UNSPECIFIED CHRONICITY PATTERN, UNSPECIFIED HEADACHE TYPE: ICD-10-CM

## 2019-06-07 DIAGNOSIS — Z71.89 GRIEF COUNSELING: ICD-10-CM

## 2019-06-07 DIAGNOSIS — F41.9 ANXIETY AND DEPRESSION: Primary | ICD-10-CM

## 2019-06-07 DIAGNOSIS — F32.A ANXIETY AND DEPRESSION: Primary | ICD-10-CM

## 2019-06-07 DIAGNOSIS — R10.11 RIGHT UPPER QUADRANT ABDOMINAL PAIN: ICD-10-CM

## 2019-06-07 LAB
ALBUMIN SERPL BCP-MCNC: 3.5 G/DL (ref 3.5–5.2)
ALP SERPL-CCNC: 68 U/L (ref 55–135)
ALT SERPL W/O P-5'-P-CCNC: 15 U/L (ref 10–44)
ANION GAP SERPL CALC-SCNC: 10 MMOL/L (ref 8–16)
AST SERPL-CCNC: 15 U/L (ref 10–40)
BASOPHILS # BLD AUTO: 0 K/UL (ref 0–0.2)
BASOPHILS NFR BLD: 0.7 % (ref 0–1.9)
BILIRUB SERPL-MCNC: 0.4 MG/DL (ref 0.1–1)
BUN SERPL-MCNC: 17 MG/DL (ref 8–23)
CALCIUM SERPL-MCNC: 9.4 MG/DL (ref 8.7–10.5)
CHLORIDE SERPL-SCNC: 105 MMOL/L (ref 95–110)
CO2 SERPL-SCNC: 24 MMOL/L (ref 23–29)
CREAT SERPL-MCNC: 0.8 MG/DL (ref 0.5–1.4)
DIFFERENTIAL METHOD: ABNORMAL
EOSINOPHIL # BLD AUTO: 0.1 K/UL (ref 0–0.5)
EOSINOPHIL NFR BLD: 1.1 % (ref 0–8)
ERYTHROCYTE [DISTWIDTH] IN BLOOD BY AUTOMATED COUNT: 13.2 % (ref 11.5–14.5)
EST. GFR  (AFRICAN AMERICAN): >60 ML/MIN/1.73 M^2
EST. GFR  (NON AFRICAN AMERICAN): >60 ML/MIN/1.73 M^2
GLUCOSE SERPL-MCNC: 126 MG/DL (ref 70–110)
HCT VFR BLD AUTO: 43.6 % (ref 37–48.5)
HGB BLD-MCNC: 14.8 G/DL (ref 12–16)
LIPASE SERPL-CCNC: 22 U/L (ref 4–60)
LYMPHOCYTES # BLD AUTO: 1.5 K/UL (ref 1–4.8)
LYMPHOCYTES NFR BLD: 20.9 % (ref 18–48)
MCH RBC QN AUTO: 29.3 PG (ref 27–31)
MCHC RBC AUTO-ENTMCNC: 34.1 G/DL (ref 32–36)
MCV RBC AUTO: 86 FL (ref 82–98)
MONOCYTES # BLD AUTO: 0.6 K/UL (ref 0.3–1)
MONOCYTES NFR BLD: 8.6 % (ref 4–15)
NEUTROPHILS # BLD AUTO: 4.9 K/UL (ref 1.8–7.7)
NEUTROPHILS NFR BLD: 68.7 % (ref 38–73)
PLATELET # BLD AUTO: 209 K/UL (ref 150–350)
PMV BLD AUTO: 8.7 FL (ref 9.2–12.9)
POTASSIUM SERPL-SCNC: 3.6 MMOL/L (ref 3.5–5.1)
PROT SERPL-MCNC: 6.9 G/DL (ref 6–8.4)
RBC # BLD AUTO: 5.07 M/UL (ref 4–5.4)
SODIUM SERPL-SCNC: 139 MMOL/L (ref 136–145)
WBC # BLD AUTO: 7.1 K/UL (ref 3.9–12.7)

## 2019-06-07 PROCEDURE — 1101F PT FALLS ASSESS-DOCD LE1/YR: CPT | Mod: CPTII,S$GLB,, | Performed by: INTERNAL MEDICINE

## 2019-06-07 PROCEDURE — 70450 CT HEAD/BRAIN W/O DYE: CPT | Mod: 26,HCNC,, | Performed by: RADIOLOGY

## 2019-06-07 PROCEDURE — 85025 COMPLETE CBC W/AUTO DIFF WBC: CPT | Mod: HCNC

## 2019-06-07 PROCEDURE — 83690 ASSAY OF LIPASE: CPT | Mod: HCNC

## 2019-06-07 PROCEDURE — 70450 CT HEAD WITHOUT CONTRAST: ICD-10-PCS | Mod: 26,HCNC,, | Performed by: RADIOLOGY

## 2019-06-07 PROCEDURE — 99215 OFFICE O/P EST HI 40 MIN: CPT | Mod: S$GLB,,, | Performed by: INTERNAL MEDICINE

## 2019-06-07 PROCEDURE — 99499 RISK ADDL DX/OHS AUDIT: ICD-10-PCS | Mod: S$GLB,,, | Performed by: INTERNAL MEDICINE

## 2019-06-07 PROCEDURE — 99215 PR OFFICE/OUTPT VISIT, EST, LEVL V, 40-54 MIN: ICD-10-PCS | Mod: S$GLB,,, | Performed by: INTERNAL MEDICINE

## 2019-06-07 PROCEDURE — 1101F PR PT FALLS ASSESS DOC 0-1 FALLS W/OUT INJ PAST YR: ICD-10-PCS | Mod: CPTII,S$GLB,, | Performed by: INTERNAL MEDICINE

## 2019-06-07 PROCEDURE — 80053 COMPREHEN METABOLIC PANEL: CPT | Mod: HCNC

## 2019-06-07 PROCEDURE — 70450 CT HEAD/BRAIN W/O DYE: CPT | Mod: TC,HCNC

## 2019-06-07 PROCEDURE — 99499 UNLISTED E&M SERVICE: CPT | Mod: S$GLB,,, | Performed by: INTERNAL MEDICINE

## 2019-06-07 RX ORDER — METOCLOPRAMIDE 5 MG/1
TABLET ORAL
Qty: 30 TABLET | Refills: 1 | Status: SHIPPED | OUTPATIENT
Start: 2019-06-07 | End: 2020-02-27

## 2019-06-07 RX ORDER — AMITRIPTYLINE HYDROCHLORIDE 10 MG/1
10 TABLET, FILM COATED ORAL NIGHTLY PRN
Qty: 30 TABLET | Refills: 1 | Status: SHIPPED | OUTPATIENT
Start: 2019-06-07 | End: 2023-08-18

## 2019-06-07 RX ORDER — SERTRALINE HYDROCHLORIDE 50 MG/1
50 TABLET, FILM COATED ORAL DAILY
Qty: 90 TABLET | Refills: 1 | Status: SHIPPED | OUTPATIENT
Start: 2019-06-07 | End: 2020-05-12

## 2019-06-07 RX ORDER — SERTRALINE HYDROCHLORIDE 25 MG/1
25 TABLET, FILM COATED ORAL DAILY
COMMUNITY
End: 2019-06-07 | Stop reason: SDUPTHER

## 2019-06-07 NOTE — TELEPHONE ENCOUNTER
----- Message from Lev Nelson sent at 6/7/2019 12:16 PM CDT -----  Contact: Paty with [facility]  [phone]  Type: Needs Medical Advice    Who Called:  Paty with [facility]  [phone]    Additional Information:  Advised needs clarification on these Rxs due to interaction on medications: ZOLOFT and ELAVIL. Please call to advise.

## 2019-06-07 NOTE — PROGRESS NOTES
Subjective:       Patient ID: Syeda Gonsalves is a 68 y.o. female.    Chief Complaint: Headache (refills); Dizziness; Abdominal Pain (feels like ulcer); and Depression (boyfriend )    HPI     CHIEF COMPLAINT: Headache  HPI:   presently has headache: no..    Severity is #  10  (10 point scale).  has a headache     30      days per month.  ONSET:   2 wks     ago.    QUALITY/COURSE:    worse  DURATION: hrs    The following symptoms/statements  are positive if BOLD, negative otherwise.     CHARACTERISTICS: sudden. atypical.   Awakening_the_patient_from_sleep. Worse_on_awakening.. .  Pulsating .     Squeezing.  Stabbing.  LOCATION:  . Right.  Left:. . Forehead. Face. Jaw. Eyes. Frontal. Temporal. Top/Vertex. Posterior. Radiation:   AGGRAVATING FACTORS: head trauma . FH of headache.   anxiety .  pressure points of the face . placing the head lower than the trunk .  PAST TREATMENT OR EVALUATION: medications:   CT scan .  MRI.  ASSOCIATED SYMPTOMS:  N/V .  Changes_in_memory .  Change_in_mentation .  Motor_changes . Sensation_changes.  . Stiff_neck . fever. phonophobia .  . Prodrome.neck pain  Medical History: Any_neurological_disease . migraines . Emotional_problems . Sinus_disease .        Abdominal Pain.   Hx of irritable bowel.  She has had a cholecystectomy in the past.   ONSET:    3 wks ago.    DURATION:      QUALITY/COURSE: . unchanged    LOCATION:   ruq  .--Radiation:      INTENSITY/SEVERITY: .Severity is #  10   (10 point scale).  Character:  crampy  CONTEXT/WHEN: .--Similar problems: no. Trauma: no.    The following symptoms/statements  are positive if BOLD, negative otherwise.    AGGRAVATING FACTORS: food . medications. alcohol. movement. position . bowel movements . emotional stress .  RELIEF WITH: food or milk, antacids . medications .  position . bowel movements . Eructation.  passing gas .  PAST TREATMENT OR EVALUATION: barium+_enema . Upper_gastrointestinal_series . CT_scans . sonograms .  Endoscopic_procedures .  ASSOCIATED SYMPTOMS:      Weight_loss . jaundice .   FEMALE: relation_of_pain_to_menstrual_periods .      . Possibility_of_pregnancy . . dyspareunia .  HISTORY OF: Diabetes. CAD. prior abdomina surgery. Kidney_stones.  Gallbladder_disease. Hiatal_hernia. Peptic_ulcer. colitis. Liver_disease.  FH  Colitis . . enteritis .     Last labs:  Lab Results   Component Value Date    WBC 5.90 09/18/2018    HGB 14.5 09/18/2018    HCT 43.5 09/18/2018     09/18/2018    CHOL 200 (H) 01/18/2019    CHOL 200 (H) 01/18/2019    TRIG 136 01/18/2019    TRIG 136 01/18/2019    HDL 41 01/18/2019    HDL 41 01/18/2019    ALT 12 01/18/2019    AST 15 01/18/2019     01/18/2019    K 4.2 01/18/2019     01/18/2019    CREATININE 0.8 01/18/2019    BUN 14 01/18/2019    CO2 28 01/18/2019    TSH 1.619 09/18/2018                         CHIEF COMPLAINT: Depression: yes.  . Anxiety: yes.   HPI: boyfreind killed himself 3 wks ago    ONSET/TIMING:  3 wks.  ago.  Similar problems in past: .yes.   . # of episodes  of panic per week      1 .    DURATION:  constant    QUALITY/COURSE: worse    INTENSITY/SEVERITY: .Severity is #10 (10 point scale)    CONTEXT/WHEN:  Postpartum: no..  Personal loss: yes..  Stress: yes..    MODIFIERS/TREATMENTS: . Taking medications: yes.  Compliance with taking prescribed medications: yes.  alcohol abuse: no ...  Drug abuse: no .  Chronic disease: no.      The following symptoms are positive if BOLD, negative otherwise.        SYMPTOMS/RELATED: Possible medication side effects include:  dry mouth, decreased libido, impotence, GI disturbance, headache, insomnia, weight gain and weight loss.    REVIEW OF SYSTEMS: Sadness . Weakness . Fatigue . Lack_of _enjoyment_in_life . Sleep_disturbances . Anorexia .  Increased_appetite .  Irritability . Crying_spells .  Guilt .  Lost_concentration . Suicidal_ideation .   During panic attacks:  Chest_pain  . Shortness_of_breath  . dizziness . tingling .  palpitations .    Past Medical History:   Diagnosis Date    Abnormal Pap smear     Arthritis     Asthma     Cervical cancer     Had a complete hysterectomy    GERD (gastroesophageal reflux disease)     H/O head and neck radiation 2017    Hyperlipidemia     Kidney insufficiency     Multinodular goiter 2017    Smoking 2017     Social History     Socioeconomic History    Marital status:      Spouse name: Not on file    Number of children: Not on file    Years of education: Not on file    Highest education level: Not on file   Occupational History    Not on file   Social Needs    Financial resource strain: Not on file    Food insecurity:     Worry: Not on file     Inability: Not on file    Transportation needs:     Medical: Not on file     Non-medical: Not on file   Tobacco Use    Smoking status: Former Smoker     Packs/day: 0.25     Years: 20.00     Pack years: 5.00     Types: Cigarettes     Last attempt to quit: 2017     Years since quittin.4    Smokeless tobacco: Never Used   Substance and Sexual Activity    Alcohol use: No    Drug use: No    Sexual activity: Yes     Partners: Male   Lifestyle    Physical activity:     Days per week: Not on file     Minutes per session: Not on file    Stress: Not on file   Relationships    Social connections:     Talks on phone: Not on file     Gets together: Not on file     Attends Taoism service: Not on file     Active member of club or organization: Not on file     Attends meetings of clubs or organizations: Not on file     Relationship status: Not on file   Other Topics Concern    Not on file   Social History Narrative    Not on file     Family History   Problem Relation Age of Onset    Heart disease Mother         MI    Heart disease Father         MI    Hyperlipidemia Brother     ANTHONY disease Daughter     Diabetes Son     ANTHONY disease Maternal Grandmother     Bladder Cancer Paternal Grandmother      "Heart disease Paternal Grandfather         MI    Breast cancer Neg Hx     Colon cancer Neg Hx     Ovarian cancer Neg Hx     Cancer Neg Hx            Review of Systems   Constitutional: Negative for appetite change, fever and unexpected weight change.   HENT: Negative for ear pain and sinus pressure.    Eyes: Negative for photophobia and visual disturbance.   Respiratory: Negative for cough, chest tightness and shortness of breath.    Gastrointestinal: Positive for abdominal pain. Negative for blood in stool, constipation, diarrhea, nausea and vomiting.   Genitourinary: Negative for dysuria, hematuria, vaginal bleeding and vaginal discharge.   Musculoskeletal: Negative for back pain and myalgias.   Neurological: Positive for headaches.   Psychiatric/Behavioral: Positive for dysphoric mood. The patient is nervous/anxious.          Objective:      Vitals:    06/07/19 1028   BP: 134/76   Pulse: 67   Resp: 16   Temp: 98.3 °F (36.8 °C)   TempSrc: Oral   SpO2: 97%   Weight: 83.3 kg (183 lb 10.3 oz)   Height: 5' 2" (1.575 m)   PainSc:   8   PainLoc: Head     Physical Exam   Constitutional: She appears well-developed and well-nourished.   Neck: Normal range of motion. Neck supple.   Cardiovascular: Normal rate, regular rhythm and normal heart sounds.   Pulmonary/Chest: Effort normal and breath sounds normal.   Abdominal: Soft. Bowel sounds are normal. There is no tenderness.   Neurological: She is alert.   Psychiatric: She has a normal mood and affect. Her behavior is normal. Thought content normal.   Nursing note and vitals reviewed.        Assessment:       1. Anxiety and depression    2. Grief counseling    3. Nonintractable headache, unspecified chronicity pattern, unspecified headache type    4. Right upper quadrant abdominal pain          Plan:   She is suicidal without a plan .  Severe exascerbation of depression.     Anxiety and depression  -     amitriptyline (ELAVIL) 10 MG tablet; Take 1 tablet (10 mg total) by " mouth nightly as needed for Insomnia.  Dispense: 30 tablet; Refill: 1  -     sertraline (ZOLOFT) 50 MG tablet; Take 1 tablet (50 mg total) by mouth once daily.  Dispense: 90 tablet; Refill: 1  -     Ambulatory consult to Psychiatry    Grief counseling    Nonintractable headache, unspecified chronicity pattern, unspecified headache type  -     CT Head Without Contrast; Future; Expected date: 06/07/2019  -     metoclopramide HCl (REGLAN) 5 MG tablet; 1 by mouth with 3 Elsi-Weston's at the start of a headache  Dispense: 30 tablet; Refill: 1    Right upper quadrant abdominal pain  -     CBC auto differential; Future; Expected date: 06/07/2019  -     Comprehensive metabolic panel; Future; Expected date: 06/07/2019  -     Lipase; Future; Expected date: 06/07/2019  -     US Abdomen Complete; Future; Expected date: 06/07/2019      Follow up in about 6 weeks (around 7/19/2019) for if you are not better return in one week.

## 2019-06-11 ENCOUNTER — HOSPITAL ENCOUNTER (OUTPATIENT)
Dept: RADIOLOGY | Facility: HOSPITAL | Age: 69
Discharge: HOME | End: 2019-06-11
Attending: INTERNAL MEDICINE
Payer: MEDICARE

## 2019-06-11 DIAGNOSIS — R10.11 RIGHT UPPER QUADRANT ABDOMINAL PAIN: ICD-10-CM

## 2019-06-11 PROCEDURE — 76700 US ABDOMEN COMPLETE: ICD-10-PCS | Mod: 26,HCNC,, | Performed by: RADIOLOGY

## 2019-06-11 PROCEDURE — 76700 US EXAM ABDOM COMPLETE: CPT | Mod: TC,HCNC

## 2019-06-11 PROCEDURE — 76700 US EXAM ABDOM COMPLETE: CPT | Mod: 26,HCNC,, | Performed by: RADIOLOGY

## 2019-06-28 ENCOUNTER — TELEPHONE (OUTPATIENT)
Dept: FAMILY MEDICINE | Facility: CLINIC | Age: 69
End: 2019-06-28

## 2019-06-28 NOTE — TELEPHONE ENCOUNTER
----- Message from Artemio Lombardi sent at 6/28/2019  3:10 PM CDT -----  Contact: same  Patient called in and stated she had a steroid injection in her hip at the end of May and wanted to know if it was too soon to get another one?      Patient call back number is [phone]

## 2019-07-01 ENCOUNTER — TELEPHONE (OUTPATIENT)
Dept: FAMILY MEDICINE | Facility: CLINIC | Age: 69
End: 2019-07-01

## 2019-07-01 NOTE — TELEPHONE ENCOUNTER
----- Message from Beth Cortez sent at 7/1/2019 12:41 PM CDT -----  Contact: Syeda  Type: Needs Medical Advice    Who Called:  Patient   Best Call Back Number: [phone]  Additional Information:  Calling to set up appointment for steroid injection; states spoke Jazzy. Thanks!

## 2019-07-03 ENCOUNTER — OFFICE VISIT (OUTPATIENT)
Dept: FAMILY MEDICINE | Facility: CLINIC | Age: 69
End: 2019-07-03
Payer: MEDICARE

## 2019-07-03 ENCOUNTER — DOCUMENTATION ONLY (OUTPATIENT)
Dept: FAMILY MEDICINE | Facility: CLINIC | Age: 69
End: 2019-07-03

## 2019-07-03 VITALS
BODY MASS INDEX: 33.23 KG/M2 | TEMPERATURE: 98 F | HEIGHT: 62 IN | SYSTOLIC BLOOD PRESSURE: 100 MMHG | RESPIRATION RATE: 16 BRPM | WEIGHT: 180.56 LBS | DIASTOLIC BLOOD PRESSURE: 64 MMHG | HEART RATE: 58 BPM | OXYGEN SATURATION: 97 %

## 2019-07-03 DIAGNOSIS — M70.61 TROCHANTERIC BURSITIS OF RIGHT HIP: Primary | ICD-10-CM

## 2019-07-03 DIAGNOSIS — R73.9 HYPERGLYCEMIA: ICD-10-CM

## 2019-07-03 PROCEDURE — 1101F PR PT FALLS ASSESS DOC 0-1 FALLS W/OUT INJ PAST YR: ICD-10-PCS | Mod: CPTII,S$GLB,, | Performed by: INTERNAL MEDICINE

## 2019-07-03 PROCEDURE — 99213 OFFICE O/P EST LOW 20 MIN: CPT | Mod: 25,S$GLB,, | Performed by: INTERNAL MEDICINE

## 2019-07-03 PROCEDURE — 99213 PR OFFICE/OUTPT VISIT, EST, LEVL III, 20-29 MIN: ICD-10-PCS | Mod: 25,S$GLB,, | Performed by: INTERNAL MEDICINE

## 2019-07-03 PROCEDURE — 20610 DRAIN/INJ JOINT/BURSA W/O US: CPT | Mod: RT,S$GLB,, | Performed by: INTERNAL MEDICINE

## 2019-07-03 PROCEDURE — 20610 LARGE JOINT ASPIRATION/INJECTION: R GREATER TROCHANTERIC BURSA: ICD-10-PCS | Mod: RT,S$GLB,, | Performed by: INTERNAL MEDICINE

## 2019-07-03 PROCEDURE — 1101F PT FALLS ASSESS-DOCD LE1/YR: CPT | Mod: CPTII,S$GLB,, | Performed by: INTERNAL MEDICINE

## 2019-07-03 RX ORDER — CYCLOBENZAPRINE HCL 10 MG
10 TABLET ORAL 3 TIMES DAILY PRN
COMMUNITY
End: 2020-02-27

## 2019-07-03 RX ORDER — METHYLPREDNISOLONE ACETATE 40 MG/ML
40 INJECTION, SUSPENSION INTRA-ARTICULAR; INTRALESIONAL; INTRAMUSCULAR; SOFT TISSUE
Status: DISCONTINUED | OUTPATIENT
Start: 2019-07-03 | End: 2019-07-03 | Stop reason: HOSPADM

## 2019-07-03 RX ADMIN — METHYLPREDNISOLONE ACETATE 40 MG: 40 INJECTION, SUSPENSION INTRA-ARTICULAR; INTRALESIONAL; INTRAMUSCULAR; SOFT TISSUE at 11:07

## 2019-07-03 NOTE — PROGRESS NOTES
"Subjective:       Patient ID: Syeda Gonsalves is a 68 y.o. female.    Chief Complaint: Hip Pain    HPI         Lower_Extremity_Problems(+). Pain: yes. . Injury: no.   HPI:     ONSET/TIMING: . Onset   6 days   ago.     DURATION:   Intermittent         QUALITY/COURSE:    Worse,. .     LOCATION:  Right hip      . Radiation:  Right knee     INTENSITY/SEVERITY:. Severity is #   7   (10 point scale).        MODIFIERS/TREATMENTS: Current_Limitations_are:  none..   Taking medications: no    Physical_Therapy: no.  Chiropractor: no.     SYMPTOMS/RELATED: . --Possible medication side effects include:.     The following symptoms/statements  are positive if BOLD, negative otherwise.      CONTEXT/WHEN: . Activity . weight bearing. Inactivity.  Sudden  Work related.  Similar_problems_in past.   . Litigation_pending . X-rays. CT . MRI      REVIEW OF SYMPTOMS:   Numbness. Weakness. Muscle_Problems. Fever. Joint_Problems. Swelling knee. . Erythema. Weight_loss. Instability if severe. .      .             Review of Systems      Objective:      Vitals:    07/03/19 1046   BP: 100/64   Pulse: (!) 58   Resp: 16   Temp: 97.8 °F (36.6 °C)   TempSrc: Oral   SpO2: 97%   Weight: 81.9 kg (180 lb 8.9 oz)   Height: 5' 2" (1.575 m)   PainSc:   7   PainLoc: Hip     Physical Exam   Constitutional: She appears well-developed and well-nourished.   Cardiovascular: Normal rate, regular rhythm and normal heart sounds.   Pulmonary/Chest: Effort normal and breath sounds normal.   Abdominal: Soft. There is no tenderness.   Musculoskeletal: She exhibits tenderness (Right greater trochanter).   Range of motion at the waist: Limited  Straight leg raising: Normal  Gait: Normal  Strength: Normal  Sensation: Normal   Neurological: She is alert.   Psychiatric: She has a normal mood and affect. Her behavior is normal. Thought content normal.   Nursing note and vitals reviewed.      last fasting glucose was 126.  Assessment:       1. Trochanteric bursitis of " right hip (for procedure only)      2. Hyperglycemia          Plan:       Trochanteric bursitis of right hip  -     Large Joint Aspiration/Injection: R greater trochanteric bursa  -     methylPREDNISolone acetate injection 40 mg    Hyperglycemia  -     Basic metabolic panel; Future; Expected date: 07/03/2019  -     Hemoglobin A1c; Future; Expected date: 07/03/2019      Follow up for if you are not better return in 2 weeks.

## 2019-07-03 NOTE — PROCEDURES
Large Joint Aspiration/Injection: R greater trochanteric bursa  Date/Time: 7/3/2019 11:41 AM  Performed by: Rikki Gaitan MD  Authorized by: Rikki Gaitan MD     Consent Done?:  Yes (Written)  Indications:  Pain  Timeout: Prior to procedure the correct patient, procedure, and site was verified      Location:  Hip  Site:  R greater trochanteric bursa  Needle size:  25 G  Approach:  Lateral  Medications:  40 mg methylPREDNISolone acetate 40 mg/mL  Patient tolerance:  Patient tolerated the procedure well with no immediate complications

## 2019-07-09 ENCOUNTER — CLINICAL SUPPORT (OUTPATIENT)
Dept: FAMILY MEDICINE | Facility: CLINIC | Age: 69
End: 2019-07-09
Payer: MEDICARE

## 2019-07-09 ENCOUNTER — APPOINTMENT (OUTPATIENT)
Dept: LAB | Facility: HOSPITAL | Age: 69
End: 2019-07-09
Attending: INTERNAL MEDICINE
Payer: MEDICARE

## 2019-07-09 DIAGNOSIS — R73.9 HYPERGLYCEMIA: ICD-10-CM

## 2019-07-09 LAB
ANION GAP SERPL CALC-SCNC: 9 MMOL/L (ref 8–16)
BUN SERPL-MCNC: 16 MG/DL (ref 8–23)
CALCIUM SERPL-MCNC: 9.4 MG/DL (ref 8.7–10.5)
CHLORIDE SERPL-SCNC: 104 MMOL/L (ref 95–110)
CO2 SERPL-SCNC: 28 MMOL/L (ref 23–29)
CREAT SERPL-MCNC: 0.8 MG/DL (ref 0.5–1.4)
EST. GFR  (AFRICAN AMERICAN): >60 ML/MIN/1.73 M^2
EST. GFR  (NON AFRICAN AMERICAN): >60 ML/MIN/1.73 M^2
GLUCOSE SERPL-MCNC: 77 MG/DL (ref 70–110)
POTASSIUM SERPL-SCNC: 3.8 MMOL/L (ref 3.5–5.1)
SODIUM SERPL-SCNC: 141 MMOL/L (ref 136–145)

## 2019-07-09 PROCEDURE — 80048 BASIC METABOLIC PNL TOTAL CA: CPT | Mod: HCNC

## 2019-07-09 PROCEDURE — 83036 HEMOGLOBIN GLYCOSYLATED A1C: CPT | Mod: HCNC

## 2019-07-09 NOTE — PROGRESS NOTES
ID patient by name and date of birth.  Specimen collected with 21g butterfly to right AC  using aseptic technique. Patient tolerated well.

## 2019-07-10 LAB
ESTIMATED AVG GLUCOSE: 97 MG/DL (ref 68–131)
HBA1C MFR BLD HPLC: 5 % (ref 4–5.6)

## 2019-07-15 ENCOUNTER — TELEPHONE (OUTPATIENT)
Dept: FAMILY MEDICINE | Facility: CLINIC | Age: 69
End: 2019-07-15

## 2019-07-15 NOTE — TELEPHONE ENCOUNTER
----- Message from Dalia Rush sent at 7/15/2019 11:50 AM CDT -----  Contact: pt  ..Type: Needs Lab Results    Who Called:  pt  Best Call Back Number: [phone]  Additional Information: Pt would like to speak with a nurse to get her A1C lab results  Please call to advise  Thanks

## 2019-08-13 ENCOUNTER — TELEPHONE (OUTPATIENT)
Dept: FAMILY MEDICINE | Facility: CLINIC | Age: 69
End: 2019-08-13

## 2019-08-13 DIAGNOSIS — Z12.39 BREAST CANCER SCREENING: Primary | ICD-10-CM

## 2019-08-13 NOTE — TELEPHONE ENCOUNTER
----- Message from Bebe Murcia sent at 8/13/2019 10:37 AM CDT -----  Contact: [phone]  Type:  Mammogram    Caller is requesting to schedule their annual mammogram appointment.  Order is not listed in EPIC.  Please enter order and contact patient to schedule.    Name of Caller:  ZULEIKA APARICIO [[id number]]  Where would they like the mammogram performed?  [facility]  Best Call Back Number:  [phone]

## 2019-08-15 ENCOUNTER — HOSPITAL ENCOUNTER (OUTPATIENT)
Dept: RADIOLOGY | Facility: CLINIC | Age: 69
Discharge: HOME | End: 2019-08-15
Attending: INTERNAL MEDICINE
Payer: MEDICARE

## 2019-08-15 DIAGNOSIS — Z12.39 BREAST CANCER SCREENING: ICD-10-CM

## 2019-08-15 PROCEDURE — 77063 BREAST TOMOSYNTHESIS BI: CPT | Mod: 26,HCNC,, | Performed by: RADIOLOGY

## 2019-08-15 PROCEDURE — 77063 MAMMO DIGITAL SCREENING BILAT WITH TOMOSYNTHESIS_CAD: ICD-10-PCS | Mod: 26,HCNC,, | Performed by: RADIOLOGY

## 2019-08-15 PROCEDURE — 77067 SCR MAMMO BI INCL CAD: CPT | Mod: 26,HCNC,, | Performed by: RADIOLOGY

## 2019-08-15 PROCEDURE — 77067 MAMMO DIGITAL SCREENING BILAT WITH TOMOSYNTHESIS_CAD: ICD-10-PCS | Mod: 26,HCNC,, | Performed by: RADIOLOGY

## 2019-08-15 PROCEDURE — 77067 SCR MAMMO BI INCL CAD: CPT | Mod: TC,HCNC,PO

## 2019-09-05 ENCOUNTER — OFFICE VISIT (OUTPATIENT)
Dept: GASTROENTEROLOGY | Facility: CLINIC | Age: 69
End: 2019-09-05
Payer: MEDICARE

## 2019-09-05 VITALS
HEART RATE: 63 BPM | BODY MASS INDEX: 33.59 KG/M2 | SYSTOLIC BLOOD PRESSURE: 117 MMHG | WEIGHT: 183.63 LBS | DIASTOLIC BLOOD PRESSURE: 58 MMHG

## 2019-09-05 DIAGNOSIS — R11.0 CHRONIC NAUSEA: Primary | ICD-10-CM

## 2019-09-05 PROCEDURE — 99212 PR OFFICE/OUTPT VISIT, EST, LEVL II, 10-19 MIN: ICD-10-PCS | Mod: HCNC,S$GLB,, | Performed by: INTERNAL MEDICINE

## 2019-09-05 PROCEDURE — 1101F PT FALLS ASSESS-DOCD LE1/YR: CPT | Mod: HCNC,CPTII,S$GLB, | Performed by: INTERNAL MEDICINE

## 2019-09-05 PROCEDURE — 99999 PR PBB SHADOW E&M-EST. PATIENT-LVL III: ICD-10-PCS | Mod: PBBFAC,HCNC,, | Performed by: INTERNAL MEDICINE

## 2019-09-05 PROCEDURE — 1101F PR PT FALLS ASSESS DOC 0-1 FALLS W/OUT INJ PAST YR: ICD-10-PCS | Mod: HCNC,CPTII,S$GLB, | Performed by: INTERNAL MEDICINE

## 2019-09-05 PROCEDURE — 99999 PR PBB SHADOW E&M-EST. PATIENT-LVL III: CPT | Mod: PBBFAC,HCNC,, | Performed by: INTERNAL MEDICINE

## 2019-09-05 PROCEDURE — 99212 OFFICE O/P EST SF 10 MIN: CPT | Mod: HCNC,S$GLB,, | Performed by: INTERNAL MEDICINE

## 2019-09-05 NOTE — PROGRESS NOTES
[facility] Note    CC: Nausea    HPI 68 y.o. female presents for follow up of chronic, moderate nausea and epigastric pain, not associated with alarm symptoms. She had EGD July 2018 notable for gastritis and duodenitis (negative for H.pylori). She was taking Pantoprazole which resolved her symptoms however is now off of this medication. Unfortunately, her boyfriend committed suicide in May and since that time she has suffered with depression, currently on Zoloft and Xanax which have improved her chronic GI issues.     Past Medical History:   Diagnosis Date    Abnormal Pap smear 1990    Arthritis     Asthma     Cervical cancer 1990    Had a complete hysterectomy    GERD (gastroesophageal reflux disease)     H/O head and neck radiation 1/11/2017    Hyperlipidemia     Kidney insufficiency     Multinodular goiter 1/11/2017    Smoking 1/11/2017         Review of Systems  General ROS: negative for - chills, fever or weight loss  Cardiovascular ROS: no chest pain or dyspnea on exertion  Gastrointestinal ROS: resolved nausea, no abdominal pain, no vomiting    Physical Examination  BP (!) 117/58   Pulse 63   Wt 83.3 kg (183 lb 10.3 oz)   LMP 09/25/1990   BMI 33.59 kg/m²   General appearance: alert, cooperative, tearful at times  HENT: Normocephalic, atraumatic, neck symmetrical, no nasal discharge, sclera anicteric   Lungs: clear to auscultation bilaterally, symmetric chest wall expansion bilaterally  Heart: regular rate and rhythm without rub; no displacement of the PMI   Abdomen: soft, nontender,nondistended, BS active   Extremities: extremities symmetric; no clubbing, cyanosis, or edema    Labs:  Lab Results   Component Value Date    WBC 7.10 06/07/2019    HGB 14.8 06/07/2019    HCT 43.6 06/07/2019    MCV 86 06/07/2019     06/07/2019         Assessment:   68 y.o. female with chronic nausea and upper GI symptoms that have resolved after addition of Zoloft and Xanax due to boyfriend's  suicide.     Plan:  -Ok to take Pantoprazole as needed  -RTC PRN    Harriett Pereyra MD  [facility]  [address], [address]  [place] [address]  Office: [phone]  Fax: [phone]

## 2019-09-12 DIAGNOSIS — R10.13 EPIGASTRIC PAIN: ICD-10-CM

## 2019-09-16 ENCOUNTER — DOCUMENTATION ONLY (OUTPATIENT)
Dept: FAMILY MEDICINE | Facility: CLINIC | Age: 69
End: 2019-09-16

## 2019-09-17 ENCOUNTER — TELEPHONE (OUTPATIENT)
Dept: GASTROENTEROLOGY | Facility: CLINIC | Age: 69
End: 2019-09-17

## 2019-09-17 RX ORDER — PANTOPRAZOLE SODIUM 40 MG/1
TABLET, DELAYED RELEASE ORAL
Qty: 30 TABLET | Refills: 0 | Status: SHIPPED | OUTPATIENT
Start: 2019-09-17 | End: 2020-05-12

## 2019-09-17 NOTE — TELEPHONE ENCOUNTER
Called Pantoprazole 40mg into the pt's pharmacy w/refills.     Called pt no answer, left msg to inform pt the medication refill was called into the pharmacy.

## 2019-09-17 NOTE — TELEPHONE ENCOUNTER
----- Message from Yoni Fortune sent at 9/17/2019 11:56 AM CDT -----  Type:  RX Refill Request    Who Called: self   Refill or New Rx: refill   RX Name and Strength:  pantoprazole (PROTONIX) 40 MG tablet  How is the patient currently taking it? (ex. 1XDay):   Is this a 30 day or 90 day RX: 30 day supply Preferred Pharm[facility] with phone number:  [facility]   Local or Mail Order: local Ordering Provider:  Dr Pereyra   Best Call Back Number: [phone]  Additional Information:  Pt states she really need the rx, pt says the pharmacy need approval for the rx.

## 2019-10-21 NOTE — PROGRESS NOTES
Endocrinology Clinic Progress Note  PCP: Elizabeth Varma M.D.    HPI:  Kat Scott is a 61 y.o. old patient who comes in today for review of her endocrine problems.   1 Hypothyroid:   Currently on brand name Synthroid 175 mcg per day   Most recent Thyroid labs on 10/21/19 show TSH 0.01, T4 1.9, T3 110 and Free T3 3.32  2. Vitamin D deficiency   Currently on Vitamin D supplementation of 1000 iu per day   Her most recent Vitamin D level on 10/21/2019 was 37  3. Menopause   Currently on estrogen 1 mg per day and Progesterone 100 mg per day      ROS:  Constitutional: No weight loss  Cardiac: No palpitations or racing heart  Resp: No shortness of breath  Neuro: No numbness or tinging in feet  Endo: No heat or cold intolerance, no polyuria or polydipsia  All other systems were reviewed and were negative.    Past Medical History:  Patient Active Problem List    Diagnosis Date Noted   • Acquired hypothyroidism 10/21/2019   • Mild vitamin D deficiency 10/21/2019   • Menopause 10/21/2019       Past Surgical History:  Past Surgical History:   Procedure Laterality Date   • ABDOMINAL EXPLORATION      tummy tuck   • BREAST RECONSTRUCTION      implants 1996   • THYROIDECTOMY         Allergies:  Patient has no known allergies.    Social History:  Social History     Socioeconomic History   • Marital status: Unknown     Spouse name: Not on file   • Number of children: Not on file   • Years of education: Not on file   • Highest education level: Not on file   Occupational History   • Not on file   Social Needs   • Financial resource strain: Not on file   • Food insecurity:     Worry: Not on file     Inability: Not on file   • Transportation needs:     Medical: Not on file     Non-medical: Not on file   Tobacco Use   • Smoking status: Never Smoker   • Smokeless tobacco: Never Used   Substance and Sexual Activity   • Alcohol use: Yes     Alcohol/week: 3.6 oz     Types: 6 Shots of liquor per week   • Drug use: No   • Sexual  Subjective:       Patient ID: Syeda Gonsalves is a 66 y.o. female.    Chief Complaint: Hyperlipidemia (needs labs ordered)    HPI     CHIEF COMPLAINT: Gastroesophageal Reflux Disease(+) follow up.  HPI:     ONSET/TIMING:   .  Near  ago.       DURATION:    QUALITY/COURSE:  Improved    LOCATION:   substernal    INTENSITY/SEVERITY:  .Severity is #  2     (10 point scale).      MODIFIERS/TREATMENTS:  .. Taking medications:  Omeprazole    SYMPTOMS/RELATED:   . Possible medication side effects include :     The following symptoms/statements are positive if BOLD, negative otherwise.      CONTEXT/WHEN:  . Activity . Eating . Stooping . Lying .       REVIEW OF SYMPTOMS:  . Sharp_pain . Dull_pain . Burning_pain .Regurgitating_acid .  . Bloating .  Hematemesis . .  . Hematochezia . Melena . Flatulence . Steatorrhea . Acholic_Stools .  Weight_gain . Weight_Loss . Anorexia . Icterus . Jaundice .         CHIEF COMPLAINT: Hyperlipidemia. cholesterol screening: no.   HPI: Taking garlic and fish oil.  Prefers to use herbs rather than medicines.    ONSET:    MODIFIERS/TREATMENTS: . Taking medications: yes. . Non-compliance with following diet: no. .     SYMPTOMS/RELATED:Possible medication side effects include:   Myalgia: no.  .     REVIEW OF SYMPTOMS: past weights:   Wt Readings from Last 1 Encounters:   05/29/17 0940 85.9 kg (189 lb 6 oz)                                                     Last lipids: total   Lab Results   Component Value Date    CHOL 211 (H) 02/10/2017    CHOL 192 12/14/2015                                                                     HDL   Lab Results   Component Value Date    HDL 40 02/10/2017    HDL 36 (L) 12/14/2015                                                                     LDL   Lab Results   Component Value Date    LDLCALC 137.8 02/10/2017    LDLCALC 118.4 12/14/2015                                                                     TRIG   Lab Results   Component Value Date    TRIG 166  "(H) 02/10/2017    TRIG 188 (H) 12/14/2015                                                                         Review of Systems    Objective:      Vitals:    05/29/17 0940   BP: 131/82   Pulse: (!) 58   Resp: 16   Temp: 98.1 °F (36.7 °C)   TempSrc: Oral   SpO2: 98%   Weight: 85.9 kg (189 lb 6 oz)   Height: 5' 2" (1.575 m)   PainSc: 0-No pain     Physical Exam   Constitutional: She appears well-developed and well-nourished.   Eyes: Pupils are equal, round, and reactive to light.   Cardiovascular: Normal rate, regular rhythm and normal heart sounds.    Pulmonary/Chest: Effort normal and breath sounds normal.   Abdominal: Soft. There is no tenderness.   Neurological: She is alert.   Psychiatric: She has a normal mood and affect. Her behavior is normal. Thought content normal.   Nursing note and vitals reviewed.      calculated 10 year risk of heart attack is 8.2   Assessment:       1. Hyperlipidemia, unspecified hyperlipidemia type    2. Gastroesophageal reflux disease, esophagitis presence not specified    3. Weight gain    4. Colon cancer screening          Plan:     Hyperlipidemia, unspecified hyperlipidemia type  -     Lipid panel; Future; Expected date: 05/29/2017    Gastroesophageal reflux disease, esophagitis presence not specified    Weight gain  -     TSH; Future; Expected date: 05/29/2017    Colon cancer screening  -     Ambulatory referral to Gastroenterology      Return in about 3 months (around 8/29/2017).      " "activity: Not on file   Lifestyle   • Physical activity:     Days per week: Not on file     Minutes per session: Not on file   • Stress: Not on file   Relationships   • Social connections:     Talks on phone: Not on file     Gets together: Not on file     Attends Jainism service: Not on file     Active member of club or organization: Not on file     Attends meetings of clubs or organizations: Not on file     Relationship status: Not on file   • Intimate partner violence:     Fear of current or ex partner: Not on file     Emotionally abused: Not on file     Physically abused: Not on file     Forced sexual activity: Not on file   Other Topics Concern   • Not on file   Social History Narrative   • Not on file       Family History:  Family History   Problem Relation Age of Onset   • Cancer Mother    • Cancer Father        Medications:    Current Outpatient Medications:   •  SYNTHROID 175 MCG Tab, Take 1 Tab by mouth Every morning on an empty stomach. Synthroid brand medically ncecessary, Disp: 90 Tab, Rfl: 3  •  estradiol (ESTRACE) 1 MG Tab, TAKE ONE TABLET BY MOUTH ONE TIME DAILY , Disp: 90 Tab, Rfl: 2  •  progesterone (PROMETRIUM) 100 MG Cap, TAKE ONE CAPSULE BY MOUTH ONE TIME DAILY , Disp: 90 Cap, Rfl: 2  •  B Complex Vitamins (VITAMIN-B COMPLEX) Tab, Take  by mouth., Disp: , Rfl:   •  vitamin D (CHOLECALCIFEROL) 1000 UNIT Tab, Take 1,000 Units by mouth every day., Disp: , Rfl:   •  omeprazole (PRILOSEC) 40 MG delayed-release capsule, Take 40 mg by mouth every day., Disp: , Rfl:   •  therapeutic multivitamin-minerals (THERAGRAN-M) Tab, Take 1 Tab by mouth every day., Disp: , Rfl:   •  Probiotic Product (PROBIOTIC DAILY PO), Take  by mouth., Disp: , Rfl:   •  Biotin 1000 MCG Tab, Take  by mouth., Disp: , Rfl:     Labs: Reviewed    Physical Examination:  Vital signs: BP (!) 98/66 (BP Location: Left arm, Patient Position: Sitting, BP Cuff Size: Adult)   Pulse 86   Ht 1.575 m (5' 2\")   Wt 69.7 kg (153 lb 9.6 oz)   " SpO2 99%   BMI 28.09 kg/m²  Body mass index is 28.09 kg/m².  General: No apparent distress, cooperative  Eyes: No scleral icterus or discharge  ENMT: Normal on external inspection of nose, lips, normal thyroid exam  Neck: No abnormal masses on inspection  Resp: Normal effort, clear to auscultation bilaterally   CVS: Regular rate and rhythm, S1 S2 normal, no murmur   Extremities: No edema  Abdomen: abdominal obesity present  Neuro: Alert and oriented  Skin: No rash  Psych: Normal mood and affect, intact memory and able to make informed decisions    Assessment and Plan:  1. Acquired hypothyroidism  Continue current dose of levothyroxine.     2. vitamin D deficiency  Cont vit D.     3. Menopause  Doing well on estradiol and progesterone.     Return in about 6 months (around 4/23/2020).    Thank you for allowing me to participate in the care of this patient.    Jabari Lopez M.D.  10/21/19    CC:   Elizabeth Varma M.D.    This note was created using voice recognition software (Dragon). The accuracy of the dictation is limited by the abilities of the software. I have reviewed the note prior to signing, however some errors in grammar and context are still possible. If you have any questions related to this note please do not hesitate to contact our office.   This note was scribed by Amira Cody RN, CDE

## 2019-11-21 ENCOUNTER — OFFICE VISIT (OUTPATIENT)
Dept: FAMILY MEDICINE | Facility: CLINIC | Age: 69
End: 2019-11-21
Payer: MEDICARE

## 2019-11-21 DIAGNOSIS — Z53.21 PROC/TRTMT NOT CRD OUT D/T PT LV BEF SEEN BY HLTH CARE PROV: Primary | ICD-10-CM

## 2019-11-21 PROCEDURE — 99499 UNLISTED E&M SERVICE: CPT | Mod: S$GLB,,, | Performed by: NURSE PRACTITIONER

## 2019-11-21 PROCEDURE — 99499 NO LOS: ICD-10-PCS | Mod: S$GLB,,, | Performed by: NURSE PRACTITIONER

## 2019-12-02 NOTE — PROGRESS NOTES
Patient was inadvertently put on my schedule for a joint injection, which I am not qualified to do. When she found out that I could not give her an injection, she left.

## 2019-12-26 ENCOUNTER — OFFICE VISIT (OUTPATIENT)
Dept: FAMILY MEDICINE | Facility: CLINIC | Age: 69
End: 2019-12-26
Payer: MEDICARE

## 2019-12-26 VITALS
HEIGHT: 62 IN | BODY MASS INDEX: 33.87 KG/M2 | TEMPERATURE: 98 F | SYSTOLIC BLOOD PRESSURE: 126 MMHG | OXYGEN SATURATION: 97 % | HEART RATE: 64 BPM | WEIGHT: 184.06 LBS | DIASTOLIC BLOOD PRESSURE: 64 MMHG | RESPIRATION RATE: 18 BRPM

## 2019-12-26 DIAGNOSIS — M77.11 LATERAL EPICONDYLITIS OF RIGHT ELBOW: Primary | ICD-10-CM

## 2019-12-26 DIAGNOSIS — G56.21 ENTRAPMENT OF RIGHT ULNAR NERVE: ICD-10-CM

## 2019-12-26 PROCEDURE — 1101F PR PT FALLS ASSESS DOC 0-1 FALLS W/OUT INJ PAST YR: ICD-10-PCS | Mod: CPTII,S$GLB,, | Performed by: INTERNAL MEDICINE

## 2019-12-26 PROCEDURE — 99213 PR OFFICE/OUTPT VISIT, EST, LEVL III, 20-29 MIN: ICD-10-PCS | Mod: S$GLB,,, | Performed by: INTERNAL MEDICINE

## 2019-12-26 PROCEDURE — 1159F PR MEDICATION LIST DOCUMENTED IN MEDICAL RECORD: ICD-10-PCS | Mod: S$GLB,,, | Performed by: INTERNAL MEDICINE

## 2019-12-26 PROCEDURE — 1101F PT FALLS ASSESS-DOCD LE1/YR: CPT | Mod: CPTII,S$GLB,, | Performed by: INTERNAL MEDICINE

## 2019-12-26 PROCEDURE — 1125F PR PAIN SEVERITY QUANTIFIED, PAIN PRESENT: ICD-10-PCS | Mod: S$GLB,,, | Performed by: INTERNAL MEDICINE

## 2019-12-26 PROCEDURE — 99213 OFFICE O/P EST LOW 20 MIN: CPT | Mod: S$GLB,,, | Performed by: INTERNAL MEDICINE

## 2019-12-26 PROCEDURE — 1159F MED LIST DOCD IN RCRD: CPT | Mod: S$GLB,,, | Performed by: INTERNAL MEDICINE

## 2019-12-26 PROCEDURE — 1125F AMNT PAIN NOTED PAIN PRSNT: CPT | Mod: S$GLB,,, | Performed by: INTERNAL MEDICINE

## 2019-12-26 RX ORDER — GABAPENTIN 100 MG/1
CAPSULE ORAL
Qty: 150 CAPSULE | Refills: 11 | Status: SHIPPED | OUTPATIENT
Start: 2019-12-26 | End: 2021-04-23

## 2019-12-26 NOTE — PATIENT INSTRUCTIONS
Tennis elbow band    Always lift with the palm up    Do not put weight on the right elbow.      Thank you for choosing [facility].     Please fill out the patient experience survey.

## 2019-12-26 NOTE — PROGRESS NOTES
"Subjective:      12:08 PM     Patient ID: Syeda Gonsalves is a 68 y.o. female.    Chief Complaint: Hand Pain (right)    HPI         CHIEF COMPLAINT: Upper_Extremity_Problems. Pain: yes.. Weakness: no . Injury: no .  HPI:    ONSET/TIMING: . Onset  1 month ago.  Gradual. Work related: no.  Similar_problems_in past: no.     DURATION: .  Better         Paroxysmal: no .    QUALITY/COURSE:  unchanged     LOCATION:   Numbness in right  ring and little finger .  Also pain it in the right elbow radial side . Radiation: no.    INTENSITY/SEVERITY:. Severity is # 7   (10 point scale).    CONTEXT/WHEN: . Date_Expected_Work_Return is . Activity: yes. . Abduction greater than 90 degrees: no.. Inactivity: no      MODIFIERS/TREATMENTS:  Current_Limitations_are.        Taking medications: no.  Physical_Therapy: no .  Chiropractor: no . . Litigation_pending: no. . X-rays: no.. CT: no.. MRI: no.    SYMPTOMS/RELATED: . --Possible medication side effects include:.     The following symptoms are positive if BOLD, negative otherwise.       REVIEW OF SYMPTOMS:   Numbness. Weakness weak . Muscle_Problems. Fever. Joint_Problems. Swelling. Erythema. Weight_loss.    Review of Systems      Objective:      Vitals:    12/26/19 1138   BP: 126/64   Pulse: 64   Resp: 18   Temp: 98 °F (36.7 °C)   TempSrc: Oral   SpO2: 97%   Weight: 83.5 kg (184 lb 1.4 oz)   Height: 5' 2" (1.575 m)   PainSc:   7   PainLoc: Hand     Physical Exam   Constitutional: She appears well-developed and well-nourished.   Cardiovascular: Normal rate, regular rhythm and normal heart sounds.   Pulmonary/Chest: Effort normal and breath sounds normal.   Abdominal: Soft. There is no tenderness.   Musculoskeletal: She exhibits tenderness (Tender over the right lateral epicondyle).   Positive Tinel's sign at the elbow over the ulnar nerve.  Interosseous muscles weak in the right hand..   Neurological: She is alert.   Psychiatric: She has a normal mood and affect. Her behavior is " normal. Thought content normal.   Nursing note and vitals reviewed.        Assessment:       1. Lateral epicondylitis of right elbow    2. Entrapment of right ulnar nerve          Plan:       Lateral epicondylitis of right elbow  -     Ambulatory referral/consult to Orthopedics; Future; Expected date: 12/26/2019    Entrapment of right ulnar nerve  -     Ambulatory referral/consult to Orthopedics; Future; Expected date: 12/26/2019  -     gabapentin (NEURONTIN) 100 MG capsule; 1 by mouth in the morning 1 by mouth in the afternoon and 3 by mouth at bedtime  Dispense: 150 capsule; Refill: 11      Follow up for if you are not better return in one month.

## 2019-12-30 ENCOUNTER — TELEPHONE (OUTPATIENT)
Dept: ORTHOPEDICS | Facility: CLINIC | Age: 69
End: 2019-12-30

## 2019-12-31 ENCOUNTER — TELEPHONE (OUTPATIENT)
Dept: ORTHOPEDICS | Facility: CLINIC | Age: 69
End: 2019-12-31

## 2020-02-27 ENCOUNTER — OFFICE VISIT (OUTPATIENT)
Dept: FAMILY MEDICINE | Facility: CLINIC | Age: 70
End: 2020-02-27
Payer: MEDICARE

## 2020-02-27 VITALS
WEIGHT: 181.88 LBS | SYSTOLIC BLOOD PRESSURE: 122 MMHG | HEIGHT: 62 IN | HEART RATE: 68 BPM | OXYGEN SATURATION: 99 % | DIASTOLIC BLOOD PRESSURE: 64 MMHG | TEMPERATURE: 98 F | BODY MASS INDEX: 33.47 KG/M2

## 2020-02-27 DIAGNOSIS — Z13.29 SCREENING FOR ENDOCRINE, NUTRITIONAL, METABOLIC AND IMMUNITY DISORDER: ICD-10-CM

## 2020-02-27 DIAGNOSIS — R63.4 WEIGHT LOSS, ABNORMAL: ICD-10-CM

## 2020-02-27 DIAGNOSIS — E78.2 MIXED HYPERLIPIDEMIA: ICD-10-CM

## 2020-02-27 DIAGNOSIS — F32.4 MAJOR DEPRESSIVE DISORDER IN PARTIAL REMISSION, UNSPECIFIED WHETHER RECURRENT: ICD-10-CM

## 2020-02-27 DIAGNOSIS — Z13.21 SCREENING FOR ENDOCRINE, NUTRITIONAL, METABOLIC AND IMMUNITY DISORDER: ICD-10-CM

## 2020-02-27 DIAGNOSIS — Z13.0 SCREENING FOR ENDOCRINE, NUTRITIONAL, METABOLIC AND IMMUNITY DISORDER: ICD-10-CM

## 2020-02-27 DIAGNOSIS — Z88.7 HISTORY OF INFLUENZA VACCINE ALLERGY: ICD-10-CM

## 2020-02-27 DIAGNOSIS — Z13.228 SCREENING FOR ENDOCRINE, NUTRITIONAL, METABOLIC AND IMMUNITY DISORDER: ICD-10-CM

## 2020-02-27 DIAGNOSIS — Z78.0 POSTMENOPAUSAL: ICD-10-CM

## 2020-02-27 DIAGNOSIS — Z00.00 ENCOUNTER FOR PREVENTIVE HEALTH EXAMINATION: Primary | ICD-10-CM

## 2020-02-27 DIAGNOSIS — Z13.0 SCREENING FOR DEFICIENCY ANEMIA: ICD-10-CM

## 2020-02-27 PROBLEM — E66.9 OBESITY, CLASS II, BMI 35-39.9: Status: RESOLVED | Noted: 2018-08-16 | Resolved: 2020-02-27

## 2020-02-27 PROBLEM — E66.812 OBESITY, CLASS II, BMI 35-39.9: Status: RESOLVED | Noted: 2018-08-16 | Resolved: 2020-02-27

## 2020-02-27 PROCEDURE — G0439 PPPS, SUBSEQ VISIT: HCPCS | Mod: S$GLB,,, | Performed by: NURSE PRACTITIONER

## 2020-02-27 PROCEDURE — G0439 PR MEDICARE ANNUAL WELLNESS SUBSEQUENT VISIT: ICD-10-PCS | Mod: S$GLB,,, | Performed by: NURSE PRACTITIONER

## 2020-02-27 NOTE — PATIENT INSTRUCTIONS
Get regular exercise on days you are not working. Work on losing 10 more pounds.  Counseling and Referral of Other Preventative  (Italic type indicates deductible and co-insurance are waived)    Patient Name: Syeda Gonsalves  Today's Date: 2/27/2020    Health Maintenance       Date Due Completion Date    Influenza Vaccine (1) 09/01/2019 12/20/2001    Lipid Panel 01/18/2020 1/18/2019    DEXA SCAN 02/01/2020 2/1/2017    Mammogram 08/15/2021 8/15/2019    TETANUS VACCINE 05/08/2024 5/8/2014    Colonoscopy 12/21/2027 12/21/2017        No orders of the defined types were placed in this encounter.    The following information is provided to all patients.  This information is to help you find resources for any of the problems found today that may be affecting your health:                Living healthy guide: www.[facility].[place].gov      Understanding Diabetes: www.diabetes.org      Eating healthy: www.cdc.gov/healthyweight      CDC home safety checklist: www.cdc.gov/steadi/patient.html      Agency on Aging: www.goea.[place].[facility]      Alcoholics anonymous (AA): www.aa.org      Physical Activity: www.[place]ain.nih.gov/oy9tvda      Tobacco use: www.quitwithusla.org

## 2020-02-27 NOTE — PROGRESS NOTES
"Syeda Gonsalves presented for a  Medicare AWV and comprehensive Health Risk Assessment today. The following components were reviewed and updated:    · Medical history  · Family History  · Social history  · Allergies and Current Medications  · Health Risk Assessment  · Health Maintenance  · Care Team     ** See Completed Assessments for Annual Wellness Visit within the encounter summary.**       The following assessments were completed:  · Living Situation  · CAGE  · Depression Screening is positive, is seeing psych and getting counseling, has grief as well due to significant other committing suicide last year.   · Timed Get Up and Go  · Whisper Test  · Cognitive Function Screening  · Nutrition Screening  · ADL Screening  · PAQ Screening        Vitals:    02/27/20 1312   BP: 122/64   BP Location: Left arm   Patient Position: Sitting   BP Method: Medium (Manual)   Pulse: 68   Temp: 98 °F (36.7 °C)   TempSrc: Oral   SpO2: 99%   Weight: 82.5 kg (181 lb 14.1 oz)   Height: 5' 2" (1.575 m)     Body mass index is 33.27 kg/m².  Physical Exam   Constitutional: She is oriented to person, place, and time. She appears well-developed and well-nourished. No distress.   HENT:   Head: Normocephalic and atraumatic.   Eyes: Conjunctivae are normal. Right eye exhibits no discharge. Left eye exhibits no discharge. No scleral icterus.   Cardiovascular: Normal rate and intact distal pulses.   Pulmonary/Chest: No respiratory distress.   Neurological: She is alert and oriented to person, place, and time.   Skin: Skin is warm and dry. She is not diaphoretic.   Psychiatric: She has a normal mood and affect. Her behavior is normal.   Nursing note and vitals reviewed.        Diagnoses and health risks identified today and associated recommendations/orders:    Encounter for preventive health examination    Weight loss, abnormal  Comments:  Lost weight after loved one committed suicide. Weight is currently stable. Will continue to monitor. "     Major depressive disorder in partial remission, unspecified whether recurrent  Comments:  Continue with psych and counseling. Will continue to monitor.     Postmenopausal  -     DXA Bone Density Spine And Hip; Future; Expected date: 02/27/2020    Mixed hyperlipidemia  -     Lipid panel; Future; Expected date: 02/27/2020  -     TSH; Future; Expected date: 02/27/2020    Screening for deficiency anemia  -     CBC auto differential; Future; Expected date: 02/27/2020    Screening for endocrine, nutritional, metabolic and immunity disorder  -     Comprehensive metabolic panel; Future; Expected date: 02/27/2020    Body mass index (bmi) 33.0-33.9, adult   -     CBC auto differential; Future; Expected date: 02/27/2020    History of influenza vaccine allergy  Comments:  Avoid anyone who is coughing or running fever, call if symptoms start. Will continue to monitor.       Get regular exercise on days you are not working. Work on losing 10 more pounds.   Provided Syeda with a 5-10 year written screening schedule and personal prevention plan. Recommendations were developed using the USPSTF age appropriate recommendations. Education, counseling, and referrals were provided as needed. After Visit Summary printed and given to patient which includes a list of additional screenings\tests needed.  Follow up with Dr. Gaitan as scheduled.   CLEOPATRA Hi  I offered to discuss end of life issues, including information on how to make advance directives that the patient could use to name someone who would make medical decisions on their behalf if they became too ill to make themselves.    _X_Patient declined, already has on file  ___Patient is interested, I provided paper work and offered to discuss.

## 2020-02-27 NOTE — Clinical Note
I saw your patient for their annual wellness visit. I found the following issues:  Allergy to flu vaccine, needs dexa and annual labs. Boyfriend committed suicide last year, she is seeing psych and getting counseling. I ordered: dexa and labs, instructed to avoid sick people and call if she has fever and have referred them back to you for their regular care in 6 months. I hope this is helpful to you. CLEOPATRA Dasilva, FNP

## 2020-05-07 ENCOUNTER — TELEPHONE (OUTPATIENT)
Dept: FAMILY MEDICINE | Facility: CLINIC | Age: 70
End: 2020-05-07

## 2020-05-07 NOTE — TELEPHONE ENCOUNTER
----- Message from Chiara Madden sent at 5/7/2020 10:34 AM CDT -----  Name of Caller patient    Reason for Visit/Symptoms  Pain in hip, requsting injection  Best Contact Number or Confirm if Mychart Preferred [phone]  Preferred Date/Time of Appointment today   Interested in Virtual Visit (yes/no) no  Additional Information

## 2020-05-12 ENCOUNTER — OFFICE VISIT (OUTPATIENT)
Dept: FAMILY MEDICINE | Facility: CLINIC | Age: 70
End: 2020-05-12
Payer: MEDICARE

## 2020-05-12 VITALS
DIASTOLIC BLOOD PRESSURE: 74 MMHG | HEIGHT: 62 IN | TEMPERATURE: 98 F | WEIGHT: 182.56 LBS | RESPIRATION RATE: 16 BRPM | BODY MASS INDEX: 33.6 KG/M2 | OXYGEN SATURATION: 97 % | SYSTOLIC BLOOD PRESSURE: 132 MMHG | HEART RATE: 69 BPM

## 2020-05-12 DIAGNOSIS — E66.01 MORBID (SEVERE) OBESITY DUE TO EXCESS CALORIES: ICD-10-CM

## 2020-05-12 DIAGNOSIS — M70.61 TROCHANTERIC BURSITIS OF RIGHT HIP: Primary | ICD-10-CM

## 2020-05-12 DIAGNOSIS — K21.9 GASTROESOPHAGEAL REFLUX DISEASE, ESOPHAGITIS PRESENCE NOT SPECIFIED: ICD-10-CM

## 2020-05-12 PROCEDURE — 99213 PR OFFICE/OUTPT VISIT, EST, LEVL III, 20-29 MIN: ICD-10-PCS | Mod: 25,S$GLB,, | Performed by: INTERNAL MEDICINE

## 2020-05-12 PROCEDURE — 20610 LARGE JOINT ASPIRATION/INJECTION: R GREATER TROCHANTERIC BURSA: ICD-10-PCS | Mod: RT,S$GLB,, | Performed by: INTERNAL MEDICINE

## 2020-05-12 PROCEDURE — 1125F AMNT PAIN NOTED PAIN PRSNT: CPT | Mod: S$GLB,,, | Performed by: INTERNAL MEDICINE

## 2020-05-12 PROCEDURE — 1159F PR MEDICATION LIST DOCUMENTED IN MEDICAL RECORD: ICD-10-PCS | Mod: S$GLB,,, | Performed by: INTERNAL MEDICINE

## 2020-05-12 PROCEDURE — 1100F PTFALLS ASSESS-DOCD GE2>/YR: CPT | Mod: CPTII,S$GLB,, | Performed by: INTERNAL MEDICINE

## 2020-05-12 PROCEDURE — 1159F MED LIST DOCD IN RCRD: CPT | Mod: S$GLB,,, | Performed by: INTERNAL MEDICINE

## 2020-05-12 PROCEDURE — 1125F PR PAIN SEVERITY QUANTIFIED, PAIN PRESENT: ICD-10-PCS | Mod: S$GLB,,, | Performed by: INTERNAL MEDICINE

## 2020-05-12 PROCEDURE — 99499 UNLISTED E&M SERVICE: CPT | Mod: S$GLB,,, | Performed by: INTERNAL MEDICINE

## 2020-05-12 PROCEDURE — 3288F PR FALLS RISK ASSESSMENT DOCUMENTED: ICD-10-PCS | Mod: CPTII,S$GLB,, | Performed by: INTERNAL MEDICINE

## 2020-05-12 PROCEDURE — 20610 DRAIN/INJ JOINT/BURSA W/O US: CPT | Mod: RT,S$GLB,, | Performed by: INTERNAL MEDICINE

## 2020-05-12 PROCEDURE — 99213 OFFICE O/P EST LOW 20 MIN: CPT | Mod: 25,S$GLB,, | Performed by: INTERNAL MEDICINE

## 2020-05-12 PROCEDURE — 99499 RISK ADDL DX/OHS AUDIT: ICD-10-PCS | Mod: S$GLB,,, | Performed by: INTERNAL MEDICINE

## 2020-05-12 PROCEDURE — 3288F FALL RISK ASSESSMENT DOCD: CPT | Mod: CPTII,S$GLB,, | Performed by: INTERNAL MEDICINE

## 2020-05-12 PROCEDURE — 1100F PR PT FALLS ASSESS DOC 2+ FALLS/FALL W/INJURY/YR: ICD-10-PCS | Mod: CPTII,S$GLB,, | Performed by: INTERNAL MEDICINE

## 2020-05-12 RX ORDER — METHYLPREDNISOLONE ACETATE 40 MG/ML
40 INJECTION, SUSPENSION INTRA-ARTICULAR; INTRALESIONAL; INTRAMUSCULAR; SOFT TISSUE
Status: DISCONTINUED | OUTPATIENT
Start: 2020-05-12 | End: 2020-05-12 | Stop reason: HOSPADM

## 2020-05-12 RX ORDER — PANTOPRAZOLE SODIUM 40 MG/1
40 TABLET, DELAYED RELEASE ORAL DAILY
Qty: 30 TABLET | Refills: 11 | Status: SHIPPED | OUTPATIENT
Start: 2020-05-12 | End: 2021-04-22

## 2020-05-12 RX ORDER — SERTRALINE HYDROCHLORIDE 100 MG/1
1.5 TABLET, FILM COATED ORAL DAILY
COMMUNITY
Start: 2020-04-09

## 2020-05-12 RX ORDER — PANTOPRAZOLE SODIUM 40 MG/1
40 TABLET, DELAYED RELEASE ORAL DAILY
Qty: 30 TABLET | Refills: 11 | Status: SHIPPED | OUTPATIENT
Start: 2020-05-12 | End: 2020-05-12

## 2020-05-12 RX ADMIN — METHYLPREDNISOLONE ACETATE 40 MG: 40 INJECTION, SUSPENSION INTRA-ARTICULAR; INTRALESIONAL; INTRAMUSCULAR; SOFT TISSUE at 02:05

## 2020-05-12 NOTE — PROGRESS NOTES
Subjective:      2:30 PM     Patient ID: Syeda Gonsalves is a 69 y.o. female.    Chief Complaint: Hip Pain (no refills needed) and Gastroesophageal Reflux    HPI         Lower_Extremity_Problems(+). Pain: yes. . Injury: no.   HPI:  Similar problems in the past responded to injections    ONSET/TIMING: . Onset    1 week  ago.     DURATION:   Intermittent         QUALITY/COURSE:    unchanged ,. .     LOCATION:  Right hip      . Radiation:  Down to right calf.      INTENSITY/SEVERITY:. Severity is #   9/10 for 4 days now 5/10  (10 point scale).        MODIFIERS/TREATMENTS: Current_Limitations_are:  none..   Taking medications: no    Physical_Therapy: no.  Chiropractor: no.     SYMPTOMS/RELATED: . --Possible medication side effects include:.     The following symptoms/statements  are positive if BOLD, negative otherwise.      CONTEXT/WHEN: . Activity . weight bearing. Inactivity.  Sudden  Work related.  Similar_problems_in past.   . Litigation_pending . X-rays. CT . MRI      REVIEW OF SYMPTOMS:   Numbness. Weakness. Muscle_Problems. Fever. Joint_Problems. Swelling. Erythema. Weight_loss. Instability        CHIEF COMPLAINT: Gastroesophageal Reflux Disease(+) follow up.  HPI:     ONSET/TIMIN year   ago.       DURATION:    QUALITY/COURSE:  unchanged    LOCATION:   substernal /  epigastric    INTENSITY/SEVERITY:  .Severity is #   5     (10 point scale).  The gap the the the she these the me we get another sh:  Free the office obesity lot the suction  MODIFIERS/TREATMENTS:  .. Taking medications:      SYMPTOMS/RELATED:   . Possible medication side effects include :     The following symptoms/statements are positive if BOLD, negative otherwise.      CONTEXT/WHEN:  . Activity . Eating 30 min later . Stooping . Lying .       REVIEW OF SYMPTOMS:  . Sharp_pain . Dull_pain . Burning_pain .Regurgitating_acid rare .  . Bloating .  Hematemesis . .  . Hematochezia . Melena . Flatulence . Steatorrhea . Acholic_Stools .   "Weight_gain . Weight_Loss . Anorexia . Icterus . Jaundice .     Recently increase sertraline to 100 mg due to continue depression.    Review of Systems      Objective:      Vitals:    05/12/20 1419   BP: 132/74   Pulse: 69   Resp: 16   Temp: 98.2 °F (36.8 °C)   TempSrc: Oral   SpO2: 97%   Weight: 82.8 kg (182 lb 8.7 oz)   Height: 5' 2" (1.575 m)   PainSc:   5   PainLoc: Hip     Physical Exam   Constitutional: She appears well-developed and well-nourished.   Cardiovascular: Normal rate, regular rhythm and normal heart sounds.   Pulmonary/Chest: Effort normal and breath sounds normal.   Abdominal: Soft. There is tenderness (Mild epigastric).   Musculoskeletal: She exhibits tenderness ( over over right greater trochanter).   Range of motion at the waist: Limited  Straight leg raising: Normal  Gait: Normal  Strength: Normal  Sensation: Normal   Neurological: She is alert.   Psychiatric: She has a normal mood and affect. Her behavior is normal. Thought content normal.   Nursing note and vitals reviewed.    No results found for this or any previous visit (from the past 1008 hour(s)).       Assessment:       1. Trochanteric bursitis of right hip (for procedure only)      2. Gastroesophageal reflux disease, esophagitis presence not specified    3. Morbid (severe) obesity due to excess calories          Plan:       Trochanteric bursitis of right hip  -     Large Joint Aspiration/Injection: R greater trochanteric bursa  -     methylPREDNISolone acetate injection 40 mg    Gastroesophageal reflux disease, esophagitis presence not specified  -     Discontinue: pantoprazole (PROTONIX) 40 MG tablet; Take 1 tablet (40 mg total) by mouth once daily.  Dispense: 30 tablet; Refill: 11  -     pantoprazole (PROTONIX) 40 MG tablet; Take 1 tablet (40 mg total) by mouth once daily.  Dispense: 30 tablet; Refill: 11    Morbid (severe) obesity due to excess calories      Follow up in about 3 months (around 8/12/2020).      "

## 2020-05-12 NOTE — PATIENT INSTRUCTIONS
Take Protonix daily a 0.5 hr before meals.  You need to see a gastroenterologist if not better in a week.    Get lab work soon.    Thank you for choosing [facility].     Please fill out the patient experience survey.

## 2020-05-12 NOTE — PROCEDURES
Large Joint Aspiration/Injection: R greater trochanteric bursa  Date/Time: 5/12/2020 2:20 PM  Performed by: Rikki Gaitan MD  Authorized by: Rikki Gaitan MD     Consent Done?:  Yes (Written)  Indications:  Pain  Timeout: prior to procedure the correct patient, procedure, and site was verified    Prep: patient was prepped and draped in usual sterile fashion      Local anesthesia used?: Yes    Anesthesia:  Local infiltration  Local anesthetic:  Bupivacaine 0.5% without epinephrine  Anesthetic total (ml):  1      Details:  Needle Size:  22 G  Approach:  Lateral  Location:  Hip  Site:  R greater trochanteric bursa  Medications:  40 mg methylPREDNISolone acetate 40 mg/mL  Patient tolerance:  Patient tolerated the procedure well with no immediate complications

## 2020-05-22 ENCOUNTER — HOSPITAL ENCOUNTER (OUTPATIENT)
Dept: RADIOLOGY | Facility: CLINIC | Age: 70
Discharge: HOME | End: 2020-05-22
Attending: NURSE PRACTITIONER
Payer: MEDICARE

## 2020-05-22 DIAGNOSIS — Z78.0 POSTMENOPAUSAL: ICD-10-CM

## 2020-05-22 PROCEDURE — 77080 DXA BONE DENSITY AXIAL: CPT | Mod: 26,HCNC,, | Performed by: RADIOLOGY

## 2020-05-22 PROCEDURE — 77080 DXA BONE DENSITY AXIAL: CPT | Mod: TC,HCNC,PO

## 2020-05-22 PROCEDURE — 77080 DEXA BONE DENSITY SPINE HIP: ICD-10-PCS | Mod: 26,HCNC,, | Performed by: RADIOLOGY

## 2020-05-25 DIAGNOSIS — R89.9 ABNORMAL LABORATORY TEST: Primary | ICD-10-CM

## 2020-05-25 NOTE — PROGRESS NOTES
Normal study    This was reviewed by Dr. Gaitan.  If you have any questions needs or problems let us know

## 2020-07-02 ENCOUNTER — LAB VISIT (OUTPATIENT)
Dept: LAB | Facility: HOSPITAL | Age: 70
End: 2020-07-02
Attending: NURSE PRACTITIONER
Payer: MEDICARE

## 2020-07-02 DIAGNOSIS — R89.9 ABNORMAL LABORATORY TEST: ICD-10-CM

## 2020-07-02 LAB
BASOPHILS # BLD AUTO: 0.04 K/UL (ref 0–0.2)
BASOPHILS NFR BLD: 0.5 % (ref 0–1.9)
DIFFERENTIAL METHOD: ABNORMAL
EOSINOPHIL # BLD AUTO: 0.1 K/UL (ref 0–0.5)
EOSINOPHIL NFR BLD: 1.9 % (ref 0–8)
ERYTHROCYTE [DISTWIDTH] IN BLOOD BY AUTOMATED COUNT: 13.2 % (ref 11.5–14.5)
HCT VFR BLD AUTO: 47.7 % (ref 37–48.5)
HGB BLD-MCNC: 14.9 G/DL (ref 12–16)
IMM GRANULOCYTES # BLD AUTO: 0.03 K/UL (ref 0–0.04)
IMM GRANULOCYTES NFR BLD AUTO: 0.4 % (ref 0–0.5)
LYMPHOCYTES # BLD AUTO: 1.4 K/UL (ref 1–4.8)
LYMPHOCYTES NFR BLD: 18.4 % (ref 18–48)
MCH RBC QN AUTO: 28.5 PG (ref 27–31)
MCHC RBC AUTO-ENTMCNC: 31.2 G/DL (ref 32–36)
MCV RBC AUTO: 91 FL (ref 82–98)
MONOCYTES # BLD AUTO: 0.8 K/UL (ref 0.3–1)
MONOCYTES NFR BLD: 10.5 % (ref 4–15)
NEUTROPHILS # BLD AUTO: 5.1 K/UL (ref 1.8–7.7)
NEUTROPHILS NFR BLD: 68.3 % (ref 38–73)
NRBC BLD-RTO: 0 /100 WBC
PLATELET # BLD AUTO: 181 K/UL (ref 150–350)
PMV BLD AUTO: 10.6 FL (ref 9.2–12.9)
RBC # BLD AUTO: 5.23 M/UL (ref 4–5.4)
WBC # BLD AUTO: 7.44 K/UL (ref 3.9–12.7)

## 2020-07-02 PROCEDURE — 36415 COLL VENOUS BLD VENIPUNCTURE: CPT | Mod: HCNC,PO

## 2020-07-02 PROCEDURE — 85025 COMPLETE CBC W/AUTO DIFF WBC: CPT | Mod: HCNC

## 2020-07-09 ENCOUNTER — OFFICE VISIT (OUTPATIENT)
Dept: FAMILY MEDICINE | Facility: CLINIC | Age: 70
End: 2020-07-09
Payer: MEDICARE

## 2020-07-09 VITALS
RESPIRATION RATE: 16 BRPM | WEIGHT: 183.63 LBS | HEIGHT: 62 IN | OXYGEN SATURATION: 96 % | SYSTOLIC BLOOD PRESSURE: 132 MMHG | DIASTOLIC BLOOD PRESSURE: 62 MMHG | TEMPERATURE: 99 F | BODY MASS INDEX: 33.79 KG/M2 | HEART RATE: 67 BPM

## 2020-07-09 DIAGNOSIS — E78.2 MIXED HYPERLIPIDEMIA: ICD-10-CM

## 2020-07-09 DIAGNOSIS — L71.9 ACNE ROSACEA: Primary | ICD-10-CM

## 2020-07-09 PROCEDURE — 1126F AMNT PAIN NOTED NONE PRSNT: CPT | Mod: S$GLB,,, | Performed by: INTERNAL MEDICINE

## 2020-07-09 PROCEDURE — 1126F PR PAIN SEVERITY QUANTIFIED, NO PAIN PRESENT: ICD-10-PCS | Mod: S$GLB,,, | Performed by: INTERNAL MEDICINE

## 2020-07-09 PROCEDURE — 99499 RISK ADDL DX/OHS AUDIT: ICD-10-PCS | Mod: S$GLB,,, | Performed by: INTERNAL MEDICINE

## 2020-07-09 PROCEDURE — 1159F MED LIST DOCD IN RCRD: CPT | Mod: S$GLB,,, | Performed by: INTERNAL MEDICINE

## 2020-07-09 PROCEDURE — 1101F PR PT FALLS ASSESS DOC 0-1 FALLS W/OUT INJ PAST YR: ICD-10-PCS | Mod: CPTII,S$GLB,, | Performed by: INTERNAL MEDICINE

## 2020-07-09 PROCEDURE — 99499 UNLISTED E&M SERVICE: CPT | Mod: S$GLB,,, | Performed by: INTERNAL MEDICINE

## 2020-07-09 PROCEDURE — 1159F PR MEDICATION LIST DOCUMENTED IN MEDICAL RECORD: ICD-10-PCS | Mod: S$GLB,,, | Performed by: INTERNAL MEDICINE

## 2020-07-09 PROCEDURE — 1101F PT FALLS ASSESS-DOCD LE1/YR: CPT | Mod: CPTII,S$GLB,, | Performed by: INTERNAL MEDICINE

## 2020-07-09 PROCEDURE — 3008F PR BODY MASS INDEX (BMI) DOCUMENTED: ICD-10-PCS | Mod: CPTII,S$GLB,, | Performed by: INTERNAL MEDICINE

## 2020-07-09 PROCEDURE — 99214 PR OFFICE/OUTPT VISIT, EST, LEVL IV, 30-39 MIN: ICD-10-PCS | Mod: S$GLB,,, | Performed by: INTERNAL MEDICINE

## 2020-07-09 PROCEDURE — 3008F BODY MASS INDEX DOCD: CPT | Mod: CPTII,S$GLB,, | Performed by: INTERNAL MEDICINE

## 2020-07-09 PROCEDURE — 99214 OFFICE O/P EST MOD 30 MIN: CPT | Mod: S$GLB,,, | Performed by: INTERNAL MEDICINE

## 2020-07-09 RX ORDER — CLINDAMYCIN PHOSPHATE 10 MG/G
GEL TOPICAL 2 TIMES DAILY
Qty: 60 G | Refills: 3 | Status: SHIPPED | OUTPATIENT
Start: 2020-07-09

## 2020-07-09 NOTE — MEDICAL/APP STUDENT
Subjective:       Patient ID: Syeda Gonsalves is a 69 y.o. female.    Chief Complaint: Hyperlipidemia (no refills needed)    HPI       CHIEF COMPLAINT: Hyperlipidemia. cholesterol screening: no.   HPI:     ONSET: years    MODIFIERS/TREATMENTS: . Taking medications: no. . Non-compliance with following diet: no. .     SYMPTOMS/RELATED:Possible medication side effects include:   Myalgia: no.  .     REVIEW OF SYMPTOMS: past weights:   Wt Readings from Last 1 Encounters:   07/09/20 1338 83.3 kg (183 lb 10.3 oz)                                                     Last lipids: total   Lab Results   Component Value Date    CHOL 259 (H) 05/22/2020    CHOL 200 (H) 01/18/2019    CHOL 200 (H) 01/18/2019                                                                     HDL   Lab Results   Component Value Date    HDL 42 05/22/2020    HDL 41 01/18/2019    HDL 41 01/18/2019                                                                     LDL   Lab Results   Component Value Date    LDLCALC 181.6 (H) 05/22/2020    LDLCALC 131.8 01/18/2019    LDLCALC 131.8 01/18/2019                                                                     TRIG   Lab Results   Component Value Date    TRIG 177 (H) 05/22/2020    TRIG 136 01/18/2019    TRIG 136 01/18/2019                                                                         CHIEF COMPLAINT: Rash  HPI:     ONSET/TIMING: Onset years ago. Sudden: no.. Work related: no. Similar_problems_in_the_past: no.    DURATION:  Continuous..    QUALITY/COURSE:  worsened .     LOCATION:  Face, see picture on chart    INTENSITY/SEVERITY:  Severity is #   5    (10 point scale).    CONTEXT/WHEN: .--Similar problems: no . .  Past treatments: none  . Exposure_to_others_with_similar_symptoms: no . . Exposure_to_poison _ivy: no. .   New exposures (soaps, lotions, laundry detergents, foods, medications, plants, insects or animals). no    SYMPTOMS/RELATED: .--Possible medication side effect:    The following  symptoms are positive if BOLD, negative otherwise.     REVIEW OF SYMPTOMS:  Itching.  Pain. Sharp_pain. Dull_pain. Burning_pain.  Erythema-Skin. Hypopigmentation.  hyperpigmentation . Inflammation. Herald_Patch.. fixed . evanescent.  Blisters. Purulence. Fever. Fatigue. Tick_Bites.       GERD and right hip pain have resolved since last visit two weeks ago          Review of Systems   Constitutional: Negative for chills and fatigue.   HENT: Negative for ear pain, rhinorrhea and sore throat.    Eyes: Negative for pain.   Respiratory: Negative for cough and shortness of breath.    Cardiovascular: Negative for chest pain and leg swelling.   Gastrointestinal: Negative for abdominal pain, constipation, diarrhea, nausea and vomiting.   Genitourinary: Negative for dysuria.   Integumentary:  Positive for rash.   Neurological: Negative for dizziness and weakness.   Psychiatric/Behavioral: Negative for dysphoric mood.         Objective:      Physical Exam  Constitutional:       Appearance: Normal appearance.   HENT:      Head: Normocephalic and atraumatic.      Right Ear: Tympanic membrane normal.      Left Ear: Tympanic membrane normal.      Nose: Nose normal.      Mouth/Throat:      Mouth: Mucous membranes are moist.      Pharynx: No oropharyngeal exudate or posterior oropharyngeal erythema.   Eyes:      Extraocular Movements: Extraocular movements intact.      Pupils: Pupils are equal, round, and reactive to light.   Neck:      Musculoskeletal: Normal range of motion.   Cardiovascular:      Rate and Rhythm: Normal rate and regular rhythm.      Pulses: Normal pulses.      Heart sounds: No murmur.   Pulmonary:      Effort: Pulmonary effort is normal.      Breath sounds: Normal breath sounds. No wheezing.   Abdominal:      General: There is no distension.      Palpations: Abdomen is soft.      Tenderness: There is no abdominal tenderness.   Skin:     General: Skin is warm and dry.      Findings: Rash present.   Neurological:       General: No focal deficit present.      Mental Status: She is alert and oriented to person, place, and time.   Psychiatric:         Mood and Affect: Mood normal.         Assessment:     1. Rash     2. Hyperlipidemia   No diagnosis found.    Plan:         Rash    -SALIMA, C reactive protein and sed rate    - SLE v Rosacea       Hyperlipidemia    - The patient is allergic to statins (hives), ezetimibe and niacin    - She has been taking red yeast rice     - Counselled on low carb ketogenic diet, more high soluble fibers

## 2020-07-09 NOTE — PATIENT INSTRUCTIONS
Lose 5 pounds.  Suggest ketogenic diet      Thank you for choosing [facility].     Please fill out the patient experience survey.

## 2020-07-09 NOTE — PROGRESS NOTES
"Subjective:      2:06 PM     Patient ID: Syeda Gonsalves is a 69 y.o. female.    Chief Complaint: Hyperlipidemia (no refills needed)    HPI     States she has a history of rosacea.  She is getting more splotches on her face last 2 weeks.  It is worse when she is overheated.    CHIEF COMPLAINT: Hyperlipidemia. cholesterol screening: no.   HPI:     ONSET:    MODIFIERS/TREATMENTS: . Taking medications: yes. . Non-compliance with following diet: no. .     SYMPTOMS/RELATED:Possible medication side effects include:   Myalgia: no.  .     REVIEW OF SYMPTOMS: past weights:   Wt Readings from Last 1 Encounters:   07/09/20 1338 83.3 kg (183 lb 10.3 oz)                                                     Last lipids: total   Lab Results   Component Value Date    CHOL 259 (H) 05/22/2020    CHOL 200 (H) 01/18/2019    CHOL 200 (H) 01/18/2019                                                                     HDL   Lab Results   Component Value Date    HDL 42 05/22/2020    HDL 41 01/18/2019    HDL 41 01/18/2019                                                                     LDL   Lab Results   Component Value Date    LDLCALC 181.6 (H) 05/22/2020    LDLCALC 131.8 01/18/2019    LDLCALC 131.8 01/18/2019                                                                     TRIG   Lab Results   Component Value Date    TRIG 177 (H) 05/22/2020    TRIG 136 01/18/2019    TRIG 136 01/18/2019                                                                         Review of Systems      Objective:      Vitals:    07/09/20 1338   BP: 132/62   Pulse: 67   Resp: 16   Temp: 99.4 °F (37.4 °C)   TempSrc: Oral   SpO2: 96%   Weight: 83.3 kg (183 lb 10.3 oz)   Height: 5' 2" (1.575 m)   PainSc: 0-No pain     Physical Exam  Vitals signs and nursing note reviewed.   Constitutional:       Appearance: She is well-developed.   Cardiovascular:      Rate and Rhythm: Normal rate and regular rhythm.      Heart sounds: Normal heart sounds.   Pulmonary:      " Effort: Pulmonary effort is normal.      Breath sounds: Normal breath sounds.   Abdominal:      Palpations: Abdomen is soft.          Tenderness: There is no abdominal tenderness.   Neurological:      Mental Status: She is alert.   Psychiatric:         Behavior: Behavior normal.         Thought Content: Thought content normal.       Recent Results (from the past 1008 hour(s))   CBC auto differential    Collection Time: 07/02/20 10:55 AM   Result Value Ref Range    WBC 7.44 3.90 - 12.70 K/uL    RBC 5.23 4.00 - 5.40 M/uL    Hemoglobin 14.9 12.0 - 16.0 g/dL    Hematocrit 47.7 37.0 - 48.5 %    Mean Corpuscular Volume 91 82 - 98 fL    Mean Corpuscular Hemoglobin 28.5 27.0 - 31.0 pg    Mean Corpuscular Hemoglobin Conc 31.2 (L) 32.0 - 36.0 g/dL    RDW 13.2 11.5 - 14.5 %    Platelets 181 150 - 350 K/uL    MPV 10.6 9.2 - 12.9 fL    Immature Granulocytes 0.4 0.0 - 0.5 %    Gran # (ANC) 5.1 1.8 - 7.7 K/uL    Immature Grans (Abs) 0.03 0.00 - 0.04 K/uL    Lymph # 1.4 1.0 - 4.8 K/uL    Mono # 0.8 0.3 - 1.0 K/uL    Eos # 0.1 0.0 - 0.5 K/uL    Baso # 0.04 0.00 - 0.20 K/uL    nRBC 0 0 /100 WBC    Gran% 68.3 38.0 - 73.0 %    Lymph% 18.4 18.0 - 48.0 %    Mono% 10.5 4.0 - 15.0 %    Eosinophil% 1.9 0.0 - 8.0 %    Basophil% 0.5 0.0 - 1.9 %    Differential Method Automated           Assessment:       1. Acne rosacea    2. Mixed hyperlipidemia          Plan:   Will try to look rule out lupus    Acne rosacea  -     C-Reactive Protein; Future; Expected date: 07/09/2020  -     Sedimentation rate; Future; Expected date: 07/09/2020  -     SALIMA Screen w/Reflex; Future; Expected date: 07/09/2020  -     clindamycin phosphate 1% (CLINDAGEL) 1 % gel; Apply topically 2 (two) times daily.  Dispense: 60 g; Refill: 3    Mixed hyperlipidemia  -     Comprehensive metabolic panel; Future; Expected date: 07/09/2020  -     Lipid Panel; Future; Expected date: 07/09/2020      Follow up in about 3 months (around 10/9/2020).

## 2020-08-17 NOTE — TELEPHONE ENCOUNTER
----- Message from Bebe Murcia sent at 1/10/2017  9:28 AM CST -----  Contact: [phone]  Patient is requesting a call back from the nurse in regards to which route to go for treatment, experiencing weight gain.    Please call the patient upon request at phone number [phone].   Spoke with Charles. She has a home health nurse coming today at 3 PM. Nurse will be delivering medications and also obtain INR. She has been in contact with [facility] pharmacy and they are in the process of filling medications. She is aware to resume Tikosyn dose tonight and agreeable to schedule ECG tomorrow. Discussed importance of taking Tikosyn dose every 12 hours. Discsused 2 hour time frame of taking Tikosyn. She is aware and denies questions with Tikosyn use. Would prefer [facility] for ECG. Attempted to transfer to office for scheduling, but due to prolonged hold she hung up.     Please contact patient to schedule ECG visit tomorrow, 8/18/20, would prefer late morning/early afternoon appointment. ECG needed tomorrow given missed doses of Tikosyn over the weekend with plans to resume tonight. Aware ECG also scheduled on 8/27 as well.

## 2020-09-24 ENCOUNTER — TELEPHONE (OUTPATIENT)
Dept: FAMILY MEDICINE | Facility: CLINIC | Age: 70
End: 2020-09-24

## 2020-09-24 DIAGNOSIS — Z12.31 BREAST CANCER SCREENING BY MAMMOGRAM: Primary | ICD-10-CM

## 2020-09-24 NOTE — TELEPHONE ENCOUNTER
----- Message from Marie Jayjay sent at 9/24/2020  3:10 PM CDT -----  Type:  Mammogram    Caller is requesting to schedule their annual mammogram appointment.  Order is not listed in EPIC.  Please enter order and contact patient to schedule.    Name of Caller:  Syeda  Where would they like the mammogram performed?  [facility]  Best Call Back Number:  [phone]  Additional Information: Please call patient. Thank you!

## 2020-10-05 ENCOUNTER — TELEPHONE (OUTPATIENT)
Dept: FAMILY MEDICINE | Facility: CLINIC | Age: 70
End: 2020-10-05

## 2020-10-05 DIAGNOSIS — Z12.31 SCREENING MAMMOGRAM FOR HIGH-RISK PATIENT: Primary | ICD-10-CM

## 2020-10-05 NOTE — TELEPHONE ENCOUNTER
----- Message from Paula Moralez sent at 10/5/2020 10:59 AM CDT -----  Regarding: mammo screening orders  Per our mammo tech Effie [phone] the orders in epic for screening needs to be changed to Screening mammo with rita , and the diagnosis needs to be just (  visit for screening  Z12.31), please let me know when it is changed so I can let her know , patients appointment is for 10/8/20 this Thursday. Thanking you in advance.    Paula Moralez    [phone]

## 2020-10-08 ENCOUNTER — HOSPITAL ENCOUNTER (OUTPATIENT)
Dept: RADIOLOGY | Facility: HOSPITAL | Age: 70
Discharge: HOME | End: 2020-10-08
Attending: INTERNAL MEDICINE
Payer: MEDICARE

## 2020-10-08 DIAGNOSIS — Z12.31 SCREENING MAMMOGRAM FOR HIGH-RISK PATIENT: ICD-10-CM

## 2020-10-08 PROCEDURE — 77067 MAMMO DIGITAL SCREENING BILAT WITH TOMO: ICD-10-PCS | Mod: 26,HCNC,, | Performed by: RADIOLOGY

## 2020-10-08 PROCEDURE — 77063 MAMMO DIGITAL SCREENING BILAT WITH TOMO: ICD-10-PCS | Mod: 26,HCNC,, | Performed by: RADIOLOGY

## 2020-10-08 PROCEDURE — 77063 BREAST TOMOSYNTHESIS BI: CPT | Mod: 26,HCNC,, | Performed by: RADIOLOGY

## 2020-10-08 PROCEDURE — 77067 SCR MAMMO BI INCL CAD: CPT | Mod: 26,HCNC,, | Performed by: RADIOLOGY

## 2020-10-08 PROCEDURE — 77067 SCR MAMMO BI INCL CAD: CPT | Mod: TC,HCNC

## 2020-10-22 ENCOUNTER — OFFICE VISIT (OUTPATIENT)
Dept: ORTHOPEDICS | Facility: CLINIC | Age: 70
End: 2020-10-22
Payer: MEDICARE

## 2020-10-22 VITALS
BODY MASS INDEX: 34.36 KG/M2 | HEART RATE: 86 BPM | HEIGHT: 61 IN | WEIGHT: 182 LBS | DIASTOLIC BLOOD PRESSURE: 78 MMHG | OXYGEN SATURATION: 99 % | SYSTOLIC BLOOD PRESSURE: 126 MMHG

## 2020-10-22 DIAGNOSIS — S83.241A ACUTE MEDIAL MENISCUS TEAR OF RIGHT KNEE, INITIAL ENCOUNTER: Primary | ICD-10-CM

## 2020-10-22 DIAGNOSIS — M71.21 BAKER CYST, RIGHT: ICD-10-CM

## 2020-10-22 PROCEDURE — 1159F MED LIST DOCD IN RCRD: CPT | Mod: S$GLB,,, | Performed by: ORTHOPAEDIC SURGERY

## 2020-10-22 PROCEDURE — 99203 OFFICE O/P NEW LOW 30 MIN: CPT | Mod: S$GLB,,, | Performed by: ORTHOPAEDIC SURGERY

## 2020-10-22 PROCEDURE — 1101F PT FALLS ASSESS-DOCD LE1/YR: CPT | Mod: S$GLB,,, | Performed by: ORTHOPAEDIC SURGERY

## 2020-10-22 PROCEDURE — 1159F PR MEDICATION LIST DOCUMENTED IN MEDICAL RECORD: ICD-10-PCS | Mod: S$GLB,,, | Performed by: ORTHOPAEDIC SURGERY

## 2020-10-22 PROCEDURE — 1101F PR PT FALLS ASSESS DOC 0-1 FALLS W/OUT INJ PAST YR: ICD-10-PCS | Mod: S$GLB,,, | Performed by: ORTHOPAEDIC SURGERY

## 2020-10-22 PROCEDURE — 1126F PR PAIN SEVERITY QUANTIFIED, NO PAIN PRESENT: ICD-10-PCS | Mod: S$GLB,,, | Performed by: ORTHOPAEDIC SURGERY

## 2020-10-22 PROCEDURE — 3008F PR BODY MASS INDEX (BMI) DOCUMENTED: ICD-10-PCS | Mod: S$GLB,,, | Performed by: ORTHOPAEDIC SURGERY

## 2020-10-22 PROCEDURE — 1126F AMNT PAIN NOTED NONE PRSNT: CPT | Mod: S$GLB,,, | Performed by: ORTHOPAEDIC SURGERY

## 2020-10-22 PROCEDURE — 3008F BODY MASS INDEX DOCD: CPT | Mod: S$GLB,,, | Performed by: ORTHOPAEDIC SURGERY

## 2020-10-22 PROCEDURE — 99203 PR OFFICE/OUTPT VISIT, NEW, LEVL III, 30-44 MIN: ICD-10-PCS | Mod: S$GLB,,, | Performed by: ORTHOPAEDIC SURGERY

## 2020-10-22 RX ORDER — SYRINGE-NEEDLE,INSULIN,0.5 ML 28GX1/2"
SYRINGE, EMPTY DISPOSABLE MISCELLANEOUS
COMMUNITY
Start: 2020-10-20 | End: 2021-04-22

## 2020-10-22 RX ORDER — METHYLPREDNISOLONE 4 MG/1
TABLET ORAL
COMMUNITY
Start: 2020-10-20 | End: 2021-04-22

## 2020-10-22 RX ORDER — DOXYCYCLINE 100 MG/1
CAPSULE ORAL
COMMUNITY
Start: 2020-10-19 | End: 2021-04-22

## 2020-10-22 RX ORDER — PROMETHAZINE HYDROCHLORIDE AND DEXTROMETHORPHAN HYDROBROMIDE 6.25; 15 MG/5ML; MG/5ML
SYRUP ORAL
COMMUNITY
Start: 2020-10-20 | End: 2021-04-22

## 2020-10-22 NOTE — PROGRESS NOTES
[facility]    Subjective:     Chief Complaint:   Chief Complaint   Patient presents with    Right Knee - Pain, Swelling     Referred by  for bakers cyst right knee.        Past Medical History:   Diagnosis Date    Abnormal Pap smear 1990    Anxiety     Arthritis     Asthma     Back pain     Cervical cancer     Depression     GERD (gastroesophageal reflux disease)     H/O head and neck radiation 1/11/2017    Hyperlipidemia     Kidney insufficiency     Multinodular goiter 1/11/2017    Obesity     Smoking 1/11/2017    Uterine cancer        Past Surgical History:   Procedure Laterality Date    ADENOIDECTOMY      ADRENALECTOMY      CARPAL TUNNEL RELEASE      CHOLECYSTECTOMY      COLONOSCOPY N/A 12/21/2017    Procedure: COLONOSCOPY;  Surgeon: Harriett Pereyra MD;  Location: [facility] ENDO;  Service: Endoscopy;  Laterality: N/A;    ESOPHAGOGASTRODUODENOSCOPY N/A 7/18/2018    Procedure: EGD (ESOPHAGOGASTRODUODENOSCOPY);  Surgeon: Harriett Pereyra MD;  Location: [facility] ENDO;  Service: Endoscopy;  Laterality: N/A;    EYE SURGERY      HYSTERECTOMY      OOPHORECTOMY      TONSILLECTOMY         Current Outpatient Medications   Medication Sig    clindamycin phosphate 1% (CLINDAGEL) 1 % gel Apply topically 2 (two) times daily.    co-enzyme Q-10 30 mg capsule Take 30 mg by mouth 3 (three) times daily.    diclofenac sodium (VOLTAREN) 1 % Gel Apply 2 g topically once daily.    doxycycline (VIBRAMYCIN) 100 MG Cap     fluticasone (FLONASE) 50 mcg/actuation nasal spray 2 sprays (100 mcg total) by Each Nare route once daily.    gabapentin (NEURONTIN) 100 MG capsule 1 by mouth in the morning 1 by mouth in the afternoon and 3 by mouth at bedtime    GARLIC (GARLIQUE ORAL) Take 1 capsule by mouth once daily.    methylPREDNISolone (MEDROL DOSEPACK) 4 mg tablet     MUCUS RELIEF  mg 12 hr tablet     MULTIVITAMIN (MULTIPLE VITAMINS ORAL) Take by mouth.    pantoprazole (PROTONIX) 40 MG  tablet Take 1 tablet (40 mg total) by mouth once daily.    promethazine-dextromethorphan (PROMETHAZINE-DM) 6.25-15 mg/5 mL Syrp     sertraline (ZOLOFT) 100 MG tablet Take 1 tablet by mouth once daily.    SOY ISOFLA/BLK COHOSH/MAG BARK (ESTROVEN ORAL) Take by mouth daily as needed.    albuterol (VENTOLIN HFA) 90 mcg/actuation inhaler Inhale 2 puffs into the lungs every 6 (six) hours as needed for Wheezing. Rescue    amitriptyline (ELAVIL) 10 MG tablet Take 1 tablet (10 mg total) by mouth nightly as needed for Insomnia.    nitroGLYCERIN (NITROSTAT) 0.4 MG SL tablet Place 1 tablet (0.4 mg total) under the tongue every 5 (five) minutes as needed for Chest pain.     No current facility-administered medications for this visit.        Review of patient's allergies indicates:   Allergen Reactions    Flu vaccine 2011 (36 mos+)(pf) Anaphylaxis    Iodine and iodide containing products     Lipitor [atorvastatin]     Niacin preparations     Welchol [colesevelam] Other (See Comments)     GI upset and insomnia    Zetia [ezetimibe] Nausea And Vomiting       Family History   Problem Relation Age of Onset    Heart disease Mother         MI    Heart disease Father         MI    Hyperlipidemia Brother     ANTHONY disease Daughter     Diabetes Son     ANTHONY disease Maternal Grandmother     Bladder Cancer Paternal Grandmother     Heart disease Paternal Grandfather         MI    Breast cancer Neg Hx     Colon cancer Neg Hx     Ovarian cancer Neg Hx     Cancer Neg Hx        Social History     Socioeconomic History    Marital status:      Spouse name: Not on file    Number of children: Not on file    Years of education: Not on file    Highest education level: Not on file   Occupational History    Not on file   Social Needs    Financial resource strain: Not on file    Food insecurity     Worry: Not on file     Inability: Not on file    Transportation needs     Medical: Not on file     Non-medical: Not on  file   Tobacco Use    Smoking status: Former Smoker     Packs/day: 0.25     Years: 20.00     Pack years: 5.00     Types: Cigarettes     Quit date: 2017     Years since quittin.8    Smokeless tobacco: Never Used   Substance and Sexual Activity    Alcohol use: No    Drug use: No    Sexual activity: Yes     Partners: Male   Lifestyle    Physical activity     Days per week: Not on file     Minutes per session: Not on file    Stress: Not on file   Relationships    Social connections     Talks on phone: Not on file     Gets together: Not on file     Attends [employer] service: Not on file     Active member of club or organization: Not on file     Attends meetings of clubs or organizations: Not on file     Relationship status: Not on file   Other Topics Concern    Not on file   Social History Narrative    Not on file       History of present illness:  Patient comes in today for the right knee.  She is having a lot of pain in the right knee.  The knee is giving out of buckling it is catching.  She is also having a lot of pain in the back of her knee.  Denies any trauma.      Review of Systems:    Constitution: Negative for chills, fever, and sweats.  Negative for unexplained weight loss.    HENT:  Negative for headaches and blurry vision.    Cardiovascular:Negative for chest pain or irregular heart beat. Negative for hypertension.    Respiratory:  Negative for cough and shortness of breath.    Gastrointestinal: Negative for abdominal pain, heartburn, melena, nausea, and vomitting.    Genitourinary:  Negative bladder incontinence and dysuria.    Musculoskeletal:  See HPI for details.     Neurological: Negative for numbness.    Psychiatric/Behavioral: Negative for depression.  The patient is not nervous/anxious.      Endocrine: Negative for polyuria    Hematologic/Lymphatic: Negative for bleeding problem.  Does not bruise/bleed easily.    Skin: Negative for poor would healing and rash    Objective:       Physical Examination:    Vital Signs:  There were no vitals filed for this visit.    There is no height or weight on file to calculate BMI.    This a well-developed, well nourished patient in no acute distress.  They are alert and oriented and cooperative to examination.        Patient has full range of motion right knee.  She has a lot of medial joint line tenderness.  She has a lot of crepitus.  She has a +1 effusion.  She has a positive Paul's for pain and pop.  Full range of motion of the left knee without difficulty.  Pertinent New Results:    XRAY Report / Interpretation:   AP lateral and sunrise views of the right knee demonstrates mild osteoarthritis of the right knee but no fractures or subluxations.    Assessment/Plan:       Medial meniscal tear right knee.  I have ordered an MRI to better look at the medial meniscus.  I will see her back with that data.      This note was created using Dragon voice recognition software that occasionally misinterpreted phrases or words.

## 2020-11-02 ENCOUNTER — HOSPITAL ENCOUNTER (OUTPATIENT)
Dept: RADIOLOGY | Facility: HOSPITAL | Age: 70
Discharge: HOME | End: 2020-11-02
Attending: ORTHOPAEDIC SURGERY
Payer: MEDICARE

## 2020-11-02 DIAGNOSIS — S83.241A ACUTE MEDIAL MENISCUS TEAR OF RIGHT KNEE, INITIAL ENCOUNTER: ICD-10-CM

## 2020-11-02 PROCEDURE — 73721 MRI JNT OF LWR EXTRE W/O DYE: CPT | Mod: TC,PO,RT

## 2020-11-19 ENCOUNTER — OFFICE VISIT (OUTPATIENT)
Dept: ORTHOPEDICS | Facility: CLINIC | Age: 70
End: 2020-11-19
Payer: MEDICARE

## 2020-11-19 VITALS
WEIGHT: 182 LBS | HEART RATE: 65 BPM | DIASTOLIC BLOOD PRESSURE: 80 MMHG | BODY MASS INDEX: 34.36 KG/M2 | SYSTOLIC BLOOD PRESSURE: 126 MMHG | HEIGHT: 61 IN | OXYGEN SATURATION: 97 %

## 2020-11-19 DIAGNOSIS — M17.11 PRIMARY OSTEOARTHRITIS OF RIGHT KNEE: Primary | ICD-10-CM

## 2020-11-19 PROCEDURE — 99213 OFFICE O/P EST LOW 20 MIN: CPT | Mod: 25,S$GLB,, | Performed by: ORTHOPAEDIC SURGERY

## 2020-11-19 PROCEDURE — 1125F AMNT PAIN NOTED PAIN PRSNT: CPT | Mod: S$GLB,,, | Performed by: ORTHOPAEDIC SURGERY

## 2020-11-19 PROCEDURE — 1159F MED LIST DOCD IN RCRD: CPT | Mod: S$GLB,,, | Performed by: ORTHOPAEDIC SURGERY

## 2020-11-19 PROCEDURE — 20610 LARGE JOINT ASPIRATION/INJECTION: R KNEE: ICD-10-PCS | Mod: RT,S$GLB,, | Performed by: ORTHOPAEDIC SURGERY

## 2020-11-19 PROCEDURE — 99213 PR OFFICE/OUTPT VISIT, EST, LEVL III, 20-29 MIN: ICD-10-PCS | Mod: 25,S$GLB,, | Performed by: ORTHOPAEDIC SURGERY

## 2020-11-19 PROCEDURE — 3008F PR BODY MASS INDEX (BMI) DOCUMENTED: ICD-10-PCS | Mod: S$GLB,,, | Performed by: ORTHOPAEDIC SURGERY

## 2020-11-19 PROCEDURE — 1159F PR MEDICATION LIST DOCUMENTED IN MEDICAL RECORD: ICD-10-PCS | Mod: S$GLB,,, | Performed by: ORTHOPAEDIC SURGERY

## 2020-11-19 PROCEDURE — 3008F BODY MASS INDEX DOCD: CPT | Mod: S$GLB,,, | Performed by: ORTHOPAEDIC SURGERY

## 2020-11-19 PROCEDURE — 1125F PR PAIN SEVERITY QUANTIFIED, PAIN PRESENT: ICD-10-PCS | Mod: S$GLB,,, | Performed by: ORTHOPAEDIC SURGERY

## 2020-11-19 PROCEDURE — 20610 DRAIN/INJ JOINT/BURSA W/O US: CPT | Mod: RT,S$GLB,, | Performed by: ORTHOPAEDIC SURGERY

## 2020-11-19 RX ORDER — METHYLPREDNISOLONE ACETATE 40 MG/ML
40 INJECTION, SUSPENSION INTRA-ARTICULAR; INTRALESIONAL; INTRAMUSCULAR; SOFT TISSUE
Status: DISCONTINUED | OUTPATIENT
Start: 2020-11-19 | End: 2020-11-19 | Stop reason: HOSPADM

## 2020-11-19 RX ADMIN — METHYLPREDNISOLONE ACETATE 40 MG: 40 INJECTION, SUSPENSION INTRA-ARTICULAR; INTRALESIONAL; INTRAMUSCULAR; SOFT TISSUE at 11:11

## 2020-11-19 NOTE — PROGRESS NOTES
[facility]    Subjective:     Chief Complaint:   Chief Complaint   Patient presents with    Right Knee - Pain     Right knee pain has improved some, more off an on swelling and cant bend. Here to review MRI.        Past Medical History:   Diagnosis Date    Abnormal Pap smear 1990    Anxiety     Arthritis     Asthma     Back pain     Cervical cancer     Depression     GERD (gastroesophageal reflux disease)     H/O head and neck radiation 1/11/2017    Hyperlipidemia     Kidney insufficiency     Multinodular goiter 1/11/2017    Obesity     Smoking 1/11/2017    Uterine cancer        Past Surgical History:   Procedure Laterality Date    ADENOIDECTOMY      ADRENALECTOMY      CARPAL TUNNEL RELEASE      CHOLECYSTECTOMY      COLONOSCOPY N/A 12/21/2017    Procedure: COLONOSCOPY;  Surgeon: Harriett Pereyra MD;  Location: [facility] ENDO;  Service: Endoscopy;  Laterality: N/A;    ESOPHAGOGASTRODUODENOSCOPY N/A 7/18/2018    Procedure: EGD (ESOPHAGOGASTRODUODENOSCOPY);  Surgeon: Harriett Pereyra MD;  Location: [facility] ENDO;  Service: Endoscopy;  Laterality: N/A;    EYE SURGERY      HYSTERECTOMY      OOPHORECTOMY      TONSILLECTOMY         Current Outpatient Medications   Medication Sig    albuterol (VENTOLIN HFA) 90 mcg/actuation inhaler Inhale 2 puffs into the lungs every 6 (six) hours as needed for Wheezing. Rescue    amitriptyline (ELAVIL) 10 MG tablet Take 1 tablet (10 mg total) by mouth nightly as needed for Insomnia.    clindamycin phosphate 1% (CLINDAGEL) 1 % gel Apply topically 2 (two) times daily.    co-enzyme Q-10 30 mg capsule Take 30 mg by mouth 3 (three) times daily.    diclofenac sodium (VOLTAREN) 1 % Gel Apply 2 g topically once daily.    fluticasone (FLONASE) 50 mcg/actuation nasal spray 2 sprays (100 mcg total) by Each Nare route once daily.    gabapentin (NEURONTIN) 100 MG capsule 1 by mouth in the morning 1 by mouth in the afternoon and 3 by mouth at bedtime    GARLIC  (GARLIQUE ORAL) Take 1 capsule by mouth once daily.    MULTIVITAMIN (MULTIPLE VITAMINS ORAL) Take by mouth.    sertraline (ZOLOFT) 100 MG tablet Take 1 tablet by mouth once daily.    doxycycline (VIBRAMYCIN) 100 MG Cap     methylPREDNISolone (MEDROL DOSEPACK) 4 mg tablet     MUCUS RELIEF  mg 12 hr tablet     nitroGLYCERIN (NITROSTAT) 0.4 MG SL tablet Place 1 tablet (0.4 mg total) under the tongue every 5 (five) minutes as needed for Chest pain.    pantoprazole (PROTONIX) 40 MG tablet Take 1 tablet (40 mg total) by mouth once daily.    promethazine-dextromethorphan (PROMETHAZINE-DM) 6.25-15 mg/5 mL Syrp     SOY ISOFLA/BLK COHOSH/MAG BARK (ESTROVEN ORAL) Take by mouth daily as needed.     No current facility-administered medications for this visit.        Review of patient's allergies indicates:   Allergen Reactions    Flu vaccine 2011 (36 mos+)(pf) Anaphylaxis    Iodine and iodide containing products     Lipitor [atorvastatin]     Niacin preparations     Welchol [colesevelam] Other (See Comments)     GI upset and insomnia    Zetia [ezetimibe] Nausea And Vomiting       Family History   Problem Relation Age of Onset    Heart disease Mother         MI    Heart disease Father         MI    Hyperlipidemia Brother     ANTHONY disease Daughter     Diabetes Son     ANTOHNY disease Maternal Grandmother     Bladder Cancer Paternal Grandmother     Heart disease Paternal Grandfather         MI    Breast cancer Neg Hx     Colon cancer Neg Hx     Ovarian cancer Neg Hx     Cancer Neg Hx        Social History     Socioeconomic History    Marital status:      Spouse name: Not on file    Number of children: Not on file    Years of education: Not on file    Highest education level: Not on file   Occupational History    Not on file   Social Needs    Financial resource strain: Not on file    Food insecurity     Worry: Not on file     Inability: Not on file    Transportation needs     Medical: Not  on file     Non-medical: Not on file   Tobacco Use    Smoking status: Former Smoker     Packs/day: 0.25     Years: 20.00     Pack years: 5.00     Types: Cigarettes     Quit date: 2017     Years since quittin.9    Smokeless tobacco: Never Used   Substance and Sexual Activity    Alcohol use: No    Drug use: No    Sexual activity: Yes     Partners: Male   Lifestyle    Physical activity     Days per week: Not on file     Minutes per session: Not on file    Stress: Not on file   Relationships    Social connections     Talks on phone: Not on file     Gets together: Not on file     Attends Mandaen service: Not on file     Active member of club or organization: Not on file     Attends meetings of clubs or organizations: Not on file     Relationship status: Not on file   Other Topics Concern    Not on file   Social History Narrative    Not on file       History of present illness:  Patient comes in today for the right knee.  She continues to complain of achy right knee pain.  The MRI has been done      Review of Systems:    Constitution: Negative for chills, fever, and sweats.  Negative for unexplained weight loss.    HENT:  Negative for headaches and blurry vision.    Cardiovascular:Negative for chest pain or irregular heart beat. Negative for hypertension.    Respiratory:  Negative for cough and shortness of breath.    Gastrointestinal: Negative for abdominal pain, heartburn, melena, nausea, and vomitting.    Genitourinary:  Negative bladder incontinence and dysuria.    Musculoskeletal:  See HPI for details.     Neurological: Negative for numbness.    Psychiatric/Behavioral: Negative for depression.  The patient is not nervous/anxious.      Endocrine: Negative for polyuria    Hematologic/Lymphatic: Negative for bleeding problem.  Does not bruise/bleed easily.    Skin: Negative for poor would healing and rash    Objective:      Physical Examination:    Vital Signs:    Vitals:    20 1122   BP:  126/80   Pulse: 65       Body mass index is 34.39 kg/m².    This a well-developed, well nourished patient in no acute distress.  They are alert and oriented and cooperative to examination.        Patient has lot of medial joint line tenderness.  Positive Paul's.  A lot of crepitus.  1+ effusion.  Pertinent New Results:    XRAY Report / Interpretation:  And lateral of the right knee    Assessment/Plan:      MRI of the right demonstrates essentially bone-on-bone arthritis of the patellofemoral joint medial compartment.  I injected the right knee today with Depo-Medrol and lidocaine.  She will ultimately need a knee replacement.  She will follow-up with me p.r.n.      This note was created using Dragon voice recognition software that occasionally misinterpreted phrases or words.

## 2020-11-19 NOTE — PROCEDURES
Large Joint Aspiration/Injection: R knee    Date/Time: 11/19/2020 11:15 AM  Performed by: Angel Holman MD  Authorized by: Angel Holman MD     Consent Done?:  Yes (Verbal)  Indications:  Pain  Site marked: the procedure site was marked    Timeout: prior to procedure the correct patient, procedure, and site was verified    Prep: patient was prepped and draped in usual sterile fashion      Local anesthesia used?: Yes    Local anesthetic:  Lidocaine 1% without epinephrine  Ultrasonic Guidance for needle placement?: No    Location:  Knee  Site:  R knee  Medications:  40 mg methylPREDNISolone acetate 40 mg/mL; 40 mg methylPREDNISolone acetate 40 mg/mL  Patient tolerance:  Patient tolerated the procedure well with no immediate complications

## 2021-02-28 ENCOUNTER — NURSE TRIAGE (OUTPATIENT)
Dept: ADMINISTRATIVE | Facility: CLINIC | Age: 71
End: 2021-02-28

## 2021-02-28 ENCOUNTER — OFFICE VISIT (OUTPATIENT)
Dept: URGENT CARE | Facility: CLINIC | Age: 71
End: 2021-02-28
Payer: MEDICARE

## 2021-02-28 VITALS
SYSTOLIC BLOOD PRESSURE: 135 MMHG | OXYGEN SATURATION: 94 % | DIASTOLIC BLOOD PRESSURE: 78 MMHG | HEART RATE: 74 BPM | WEIGHT: 182 LBS | BODY MASS INDEX: 34.39 KG/M2 | RESPIRATION RATE: 16 BRPM

## 2021-02-28 DIAGNOSIS — M79.602 LEFT ARM PAIN: Primary | ICD-10-CM

## 2021-02-28 DIAGNOSIS — M79.18 PAIN OF LEFT DELTOID: ICD-10-CM

## 2021-02-28 PROCEDURE — 3008F BODY MASS INDEX DOCD: CPT | Mod: CPTII,S$GLB,, | Performed by: NURSE PRACTITIONER

## 2021-02-28 PROCEDURE — 73060 X-RAY EXAM OF HUMERUS: CPT | Mod: TC,LT,S$GLB, | Performed by: EMERGENCY MEDICINE

## 2021-02-28 PROCEDURE — 99214 OFFICE O/P EST MOD 30 MIN: CPT | Mod: S$GLB,,, | Performed by: NURSE PRACTITIONER

## 2021-02-28 PROCEDURE — 99214 PR OFFICE/OUTPT VISIT, EST, LEVL IV, 30-39 MIN: ICD-10-PCS | Mod: S$GLB,,, | Performed by: NURSE PRACTITIONER

## 2021-02-28 PROCEDURE — 3008F PR BODY MASS INDEX (BMI) DOCUMENTED: ICD-10-PCS | Mod: CPTII,S$GLB,, | Performed by: NURSE PRACTITIONER

## 2021-02-28 PROCEDURE — 73060 PR  X-RAY HUMERUS: ICD-10-PCS | Mod: TC,LT,S$GLB, | Performed by: EMERGENCY MEDICINE

## 2021-02-28 RX ORDER — TIZANIDINE 4 MG/1
4 TABLET ORAL EVERY 8 HOURS
Qty: 30 TABLET | Refills: 0 | Status: SHIPPED | OUTPATIENT
Start: 2021-02-28 | End: 2021-03-10

## 2021-03-04 ENCOUNTER — PES CALL (OUTPATIENT)
Dept: ADMINISTRATIVE | Facility: CLINIC | Age: 71
End: 2021-03-04

## 2021-03-22 ENCOUNTER — PES CALL (OUTPATIENT)
Dept: ADMINISTRATIVE | Facility: CLINIC | Age: 71
End: 2021-03-22

## 2021-04-08 ENCOUNTER — TELEPHONE (OUTPATIENT)
Dept: FAMILY MEDICINE | Facility: CLINIC | Age: 71
End: 2021-04-08

## 2021-04-09 ENCOUNTER — TELEPHONE (OUTPATIENT)
Dept: FAMILY MEDICINE | Facility: CLINIC | Age: 71
End: 2021-04-09

## 2021-04-22 ENCOUNTER — OFFICE VISIT (OUTPATIENT)
Dept: FAMILY MEDICINE | Facility: CLINIC | Age: 71
End: 2021-04-22
Payer: MEDICARE

## 2021-04-22 VITALS
OXYGEN SATURATION: 97 % | TEMPERATURE: 98 F | SYSTOLIC BLOOD PRESSURE: 108 MMHG | BODY MASS INDEX: 34.88 KG/M2 | DIASTOLIC BLOOD PRESSURE: 69 MMHG | HEART RATE: 70 BPM | WEIGHT: 184.75 LBS | RESPIRATION RATE: 18 BRPM | HEIGHT: 61 IN

## 2021-04-22 DIAGNOSIS — L29.9 PRURITUS, UNSPECIFIED: ICD-10-CM

## 2021-04-22 DIAGNOSIS — D69.2 PURPURA: ICD-10-CM

## 2021-04-22 DIAGNOSIS — Z00.00 ENCOUNTER FOR PREVENTIVE HEALTH EXAMINATION: Primary | ICD-10-CM

## 2021-04-22 DIAGNOSIS — Z13.228 SCREENING FOR ENDOCRINE, METABOLIC AND IMMUNITY DISORDER: ICD-10-CM

## 2021-04-22 DIAGNOSIS — Z13.0 SCREENING FOR ENDOCRINE, METABOLIC AND IMMUNITY DISORDER: ICD-10-CM

## 2021-04-22 DIAGNOSIS — Z13.29 SCREENING FOR ENDOCRINE, METABOLIC AND IMMUNITY DISORDER: ICD-10-CM

## 2021-04-22 DIAGNOSIS — F33.2 MAJOR DEPRESSIVE DISORDER, RECURRENT SEVERE WITHOUT PSYCHOTIC FEATURES: ICD-10-CM

## 2021-04-22 DIAGNOSIS — E78.5 HYPERLIPIDEMIA, UNSPECIFIED HYPERLIPIDEMIA TYPE: ICD-10-CM

## 2021-04-22 DIAGNOSIS — Z88.8 ALLERGY TO STATIN MEDICATION: ICD-10-CM

## 2021-04-22 DIAGNOSIS — R26.9 ABNORMALITY OF GAIT AND MOBILITY: ICD-10-CM

## 2021-04-22 PROBLEM — K29.90 GASTRITIS AND DUODENITIS: Status: RESOLVED | Noted: 2018-07-18 | Resolved: 2021-04-22

## 2021-04-22 PROBLEM — E66.01 MORBID (SEVERE) OBESITY DUE TO EXCESS CALORIES: Status: RESOLVED | Noted: 2020-05-12 | Resolved: 2021-04-22

## 2021-04-22 PROCEDURE — 1125F AMNT PAIN NOTED PAIN PRSNT: CPT | Mod: S$GLB,,, | Performed by: NURSE PRACTITIONER

## 2021-04-22 PROCEDURE — 3008F PR BODY MASS INDEX (BMI) DOCUMENTED: ICD-10-PCS | Mod: CPTII,S$GLB,, | Performed by: NURSE PRACTITIONER

## 2021-04-22 PROCEDURE — 3288F FALL RISK ASSESSMENT DOCD: CPT | Mod: CPTII,S$GLB,, | Performed by: NURSE PRACTITIONER

## 2021-04-22 PROCEDURE — 3288F PR FALLS RISK ASSESSMENT DOCUMENTED: ICD-10-PCS | Mod: CPTII,S$GLB,, | Performed by: NURSE PRACTITIONER

## 2021-04-22 PROCEDURE — 1101F PR PT FALLS ASSESS DOC 0-1 FALLS W/OUT INJ PAST YR: ICD-10-PCS | Mod: CPTII,S$GLB,, | Performed by: NURSE PRACTITIONER

## 2021-04-22 PROCEDURE — G0439 PR MEDICARE ANNUAL WELLNESS SUBSEQUENT VISIT: ICD-10-PCS | Mod: S$GLB,,, | Performed by: NURSE PRACTITIONER

## 2021-04-22 PROCEDURE — G0439 PPPS, SUBSEQ VISIT: HCPCS | Mod: S$GLB,,, | Performed by: NURSE PRACTITIONER

## 2021-04-22 PROCEDURE — 3008F BODY MASS INDEX DOCD: CPT | Mod: CPTII,S$GLB,, | Performed by: NURSE PRACTITIONER

## 2021-04-22 PROCEDURE — 1101F PT FALLS ASSESS-DOCD LE1/YR: CPT | Mod: CPTII,S$GLB,, | Performed by: NURSE PRACTITIONER

## 2021-04-22 PROCEDURE — 1125F PR PAIN SEVERITY QUANTIFIED, PAIN PRESENT: ICD-10-PCS | Mod: S$GLB,,, | Performed by: NURSE PRACTITIONER

## 2021-04-22 RX ORDER — AMOXICILLIN 500 MG/1
CAPSULE ORAL
COMMUNITY
Start: 2021-04-08 | End: 2021-09-08

## 2021-04-22 RX ORDER — AMPICILLIN TRIHYDRATE 250 MG
2 CAPSULE ORAL NIGHTLY
COMMUNITY

## 2021-04-23 ENCOUNTER — OFFICE VISIT (OUTPATIENT)
Dept: FAMILY MEDICINE | Facility: CLINIC | Age: 71
End: 2021-04-23
Payer: MEDICARE

## 2021-04-23 VITALS
TEMPERATURE: 99 F | WEIGHT: 183.44 LBS | HEART RATE: 65 BPM | BODY MASS INDEX: 34.63 KG/M2 | OXYGEN SATURATION: 98 % | SYSTOLIC BLOOD PRESSURE: 120 MMHG | RESPIRATION RATE: 16 BRPM | DIASTOLIC BLOOD PRESSURE: 70 MMHG | HEIGHT: 61 IN

## 2021-04-23 DIAGNOSIS — F33.2 MAJOR DEPRESSIVE DISORDER, RECURRENT SEVERE WITHOUT PSYCHOTIC FEATURES: ICD-10-CM

## 2021-04-23 DIAGNOSIS — M70.61 TROCHANTERIC BURSITIS, RIGHT HIP: ICD-10-CM

## 2021-04-23 DIAGNOSIS — M54.31 SCIATICA OF RIGHT SIDE: Primary | ICD-10-CM

## 2021-04-23 PROCEDURE — 1101F PR PT FALLS ASSESS DOC 0-1 FALLS W/OUT INJ PAST YR: ICD-10-PCS | Mod: CPTII,S$GLB,, | Performed by: INTERNAL MEDICINE

## 2021-04-23 PROCEDURE — 99213 OFFICE O/P EST LOW 20 MIN: CPT | Mod: 25,S$GLB,, | Performed by: INTERNAL MEDICINE

## 2021-04-23 PROCEDURE — 1125F AMNT PAIN NOTED PAIN PRSNT: CPT | Mod: S$GLB,,, | Performed by: INTERNAL MEDICINE

## 2021-04-23 PROCEDURE — 99499 RISK ADDL DX/OHS AUDIT: ICD-10-PCS | Mod: S$GLB,,, | Performed by: INTERNAL MEDICINE

## 2021-04-23 PROCEDURE — 1125F PR PAIN SEVERITY QUANTIFIED, PAIN PRESENT: ICD-10-PCS | Mod: S$GLB,,, | Performed by: INTERNAL MEDICINE

## 2021-04-23 PROCEDURE — 3288F FALL RISK ASSESSMENT DOCD: CPT | Mod: CPTII,S$GLB,, | Performed by: INTERNAL MEDICINE

## 2021-04-23 PROCEDURE — 20610 DRAIN/INJ JOINT/BURSA W/O US: CPT | Mod: RT,S$GLB,, | Performed by: INTERNAL MEDICINE

## 2021-04-23 PROCEDURE — 3008F BODY MASS INDEX DOCD: CPT | Mod: CPTII,S$GLB,, | Performed by: INTERNAL MEDICINE

## 2021-04-23 PROCEDURE — 99213 PR OFFICE/OUTPT VISIT, EST, LEVL III, 20-29 MIN: ICD-10-PCS | Mod: 25,S$GLB,, | Performed by: INTERNAL MEDICINE

## 2021-04-23 PROCEDURE — 1159F PR MEDICATION LIST DOCUMENTED IN MEDICAL RECORD: ICD-10-PCS | Mod: S$GLB,,, | Performed by: INTERNAL MEDICINE

## 2021-04-23 PROCEDURE — 3288F PR FALLS RISK ASSESSMENT DOCUMENTED: ICD-10-PCS | Mod: CPTII,S$GLB,, | Performed by: INTERNAL MEDICINE

## 2021-04-23 PROCEDURE — 99499 UNLISTED E&M SERVICE: CPT | Mod: S$GLB,,, | Performed by: INTERNAL MEDICINE

## 2021-04-23 PROCEDURE — 1101F PT FALLS ASSESS-DOCD LE1/YR: CPT | Mod: CPTII,S$GLB,, | Performed by: INTERNAL MEDICINE

## 2021-04-23 PROCEDURE — 3008F PR BODY MASS INDEX (BMI) DOCUMENTED: ICD-10-PCS | Mod: CPTII,S$GLB,, | Performed by: INTERNAL MEDICINE

## 2021-04-23 PROCEDURE — 1159F MED LIST DOCD IN RCRD: CPT | Mod: S$GLB,,, | Performed by: INTERNAL MEDICINE

## 2021-04-23 PROCEDURE — 20610 LARGE JOINT ASPIRATION/INJECTION: R GREATER TROCHANTERIC BURSA: ICD-10-PCS | Mod: RT,S$GLB,, | Performed by: INTERNAL MEDICINE

## 2021-04-23 RX ORDER — GABAPENTIN 300 MG/1
CAPSULE ORAL
Qty: 120 CAPSULE | Refills: 11 | Status: SHIPPED | OUTPATIENT
Start: 2021-04-23

## 2021-04-23 RX ORDER — METHYLPREDNISOLONE ACETATE 40 MG/ML
40 INJECTION, SUSPENSION INTRA-ARTICULAR; INTRALESIONAL; INTRAMUSCULAR; SOFT TISSUE
Status: DISCONTINUED | OUTPATIENT
Start: 2021-04-23 | End: 2021-04-23 | Stop reason: HOSPADM

## 2021-04-23 RX ADMIN — METHYLPREDNISOLONE ACETATE 40 MG: 40 INJECTION, SUSPENSION INTRA-ARTICULAR; INTRALESIONAL; INTRAMUSCULAR; SOFT TISSUE at 10:04

## 2021-04-26 ENCOUNTER — TELEPHONE (OUTPATIENT)
Dept: FAMILY MEDICINE | Facility: CLINIC | Age: 71
End: 2021-04-26

## 2021-04-26 ENCOUNTER — OFFICE VISIT (OUTPATIENT)
Dept: FAMILY MEDICINE | Facility: CLINIC | Age: 71
End: 2021-04-26
Payer: MEDICARE

## 2021-04-26 VITALS
BODY MASS INDEX: 34.26 KG/M2 | RESPIRATION RATE: 16 BRPM | SYSTOLIC BLOOD PRESSURE: 128 MMHG | DIASTOLIC BLOOD PRESSURE: 72 MMHG | HEIGHT: 61 IN | TEMPERATURE: 99 F | WEIGHT: 181.44 LBS | OXYGEN SATURATION: 98 % | HEART RATE: 65 BPM

## 2021-04-26 DIAGNOSIS — K43.9 HERNIA OF ABDOMINAL WALL: Primary | ICD-10-CM

## 2021-04-26 PROBLEM — Z88.8 ALLERGY TO STATIN MEDICATION: Status: ACTIVE | Noted: 2021-04-26

## 2021-04-26 PROCEDURE — 1101F PR PT FALLS ASSESS DOC 0-1 FALLS W/OUT INJ PAST YR: ICD-10-PCS | Mod: CPTII,S$GLB,, | Performed by: INTERNAL MEDICINE

## 2021-04-26 PROCEDURE — 99214 OFFICE O/P EST MOD 30 MIN: CPT | Mod: S$GLB,,, | Performed by: INTERNAL MEDICINE

## 2021-04-26 PROCEDURE — 3008F PR BODY MASS INDEX (BMI) DOCUMENTED: ICD-10-PCS | Mod: CPTII,S$GLB,, | Performed by: INTERNAL MEDICINE

## 2021-04-26 PROCEDURE — 1159F MED LIST DOCD IN RCRD: CPT | Mod: S$GLB,,, | Performed by: INTERNAL MEDICINE

## 2021-04-26 PROCEDURE — 1126F AMNT PAIN NOTED NONE PRSNT: CPT | Mod: S$GLB,,, | Performed by: INTERNAL MEDICINE

## 2021-04-26 PROCEDURE — 99214 PR OFFICE/OUTPT VISIT, EST, LEVL IV, 30-39 MIN: ICD-10-PCS | Mod: S$GLB,,, | Performed by: INTERNAL MEDICINE

## 2021-04-26 PROCEDURE — 3288F PR FALLS RISK ASSESSMENT DOCUMENTED: ICD-10-PCS | Mod: CPTII,S$GLB,, | Performed by: INTERNAL MEDICINE

## 2021-04-26 PROCEDURE — 1159F PR MEDICATION LIST DOCUMENTED IN MEDICAL RECORD: ICD-10-PCS | Mod: S$GLB,,, | Performed by: INTERNAL MEDICINE

## 2021-04-26 PROCEDURE — 1126F PR PAIN SEVERITY QUANTIFIED, NO PAIN PRESENT: ICD-10-PCS | Mod: S$GLB,,, | Performed by: INTERNAL MEDICINE

## 2021-04-26 PROCEDURE — 3288F FALL RISK ASSESSMENT DOCD: CPT | Mod: CPTII,S$GLB,, | Performed by: INTERNAL MEDICINE

## 2021-04-26 PROCEDURE — 1101F PT FALLS ASSESS-DOCD LE1/YR: CPT | Mod: CPTII,S$GLB,, | Performed by: INTERNAL MEDICINE

## 2021-04-26 PROCEDURE — 3008F BODY MASS INDEX DOCD: CPT | Mod: CPTII,S$GLB,, | Performed by: INTERNAL MEDICINE

## 2021-04-27 ENCOUNTER — TELEPHONE (OUTPATIENT)
Dept: FAMILY MEDICINE | Facility: CLINIC | Age: 71
End: 2021-04-27

## 2021-05-03 ENCOUNTER — TELEPHONE (OUTPATIENT)
Dept: FAMILY MEDICINE | Facility: CLINIC | Age: 71
End: 2021-05-03

## 2021-05-06 ENCOUNTER — TELEPHONE (OUTPATIENT)
Dept: FAMILY MEDICINE | Facility: CLINIC | Age: 71
End: 2021-05-06

## 2021-08-18 ENCOUNTER — TELEPHONE (OUTPATIENT)
Dept: FAMILY MEDICINE | Facility: CLINIC | Age: 71
End: 2021-08-18

## 2021-08-26 ENCOUNTER — TELEPHONE (OUTPATIENT)
Dept: FAMILY MEDICINE | Facility: CLINIC | Age: 71
End: 2021-08-26

## 2021-09-08 ENCOUNTER — OFFICE VISIT (OUTPATIENT)
Dept: FAMILY MEDICINE | Facility: CLINIC | Age: 71
End: 2021-09-08
Payer: MEDICARE

## 2021-09-08 VITALS
HEART RATE: 73 BPM | OXYGEN SATURATION: 97 % | HEIGHT: 61 IN | SYSTOLIC BLOOD PRESSURE: 126 MMHG | RESPIRATION RATE: 17 BRPM | BODY MASS INDEX: 34.26 KG/M2 | DIASTOLIC BLOOD PRESSURE: 70 MMHG | TEMPERATURE: 99 F | WEIGHT: 181.44 LBS

## 2021-09-08 DIAGNOSIS — M54.31 RIGHT SIDED SCIATICA: ICD-10-CM

## 2021-09-08 DIAGNOSIS — M17.11 OSTEOARTHRITIS OF RIGHT KNEE, UNSPECIFIED OSTEOARTHRITIS TYPE: ICD-10-CM

## 2021-09-08 DIAGNOSIS — H60.331 ACUTE SWIMMER'S EAR OF RIGHT SIDE: ICD-10-CM

## 2021-09-08 DIAGNOSIS — M79.604 RIGHT LEG PAIN: Primary | ICD-10-CM

## 2021-09-08 DIAGNOSIS — M70.61 TROCHANTERIC BURSITIS OF RIGHT HIP: ICD-10-CM

## 2021-09-08 PROCEDURE — 3078F DIAST BP <80 MM HG: CPT | Mod: CPTII,S$GLB,, | Performed by: NURSE PRACTITIONER

## 2021-09-08 PROCEDURE — 1160F PR REVIEW ALL MEDS BY PRESCRIBER/CLIN PHARMACIST DOCUMENTED: ICD-10-PCS | Mod: CPTII,S$GLB,, | Performed by: NURSE PRACTITIONER

## 2021-09-08 PROCEDURE — 3008F PR BODY MASS INDEX (BMI) DOCUMENTED: ICD-10-PCS | Mod: CPTII,S$GLB,, | Performed by: NURSE PRACTITIONER

## 2021-09-08 PROCEDURE — 3288F PR FALLS RISK ASSESSMENT DOCUMENTED: ICD-10-PCS | Mod: CPTII,S$GLB,, | Performed by: NURSE PRACTITIONER

## 2021-09-08 PROCEDURE — 3074F PR MOST RECENT SYSTOLIC BLOOD PRESSURE < 130 MM HG: ICD-10-PCS | Mod: CPTII,S$GLB,, | Performed by: NURSE PRACTITIONER

## 2021-09-08 PROCEDURE — 1125F PR PAIN SEVERITY QUANTIFIED, PAIN PRESENT: ICD-10-PCS | Mod: CPTII,S$GLB,, | Performed by: NURSE PRACTITIONER

## 2021-09-08 PROCEDURE — 1125F AMNT PAIN NOTED PAIN PRSNT: CPT | Mod: CPTII,S$GLB,, | Performed by: NURSE PRACTITIONER

## 2021-09-08 PROCEDURE — 1101F PR PT FALLS ASSESS DOC 0-1 FALLS W/OUT INJ PAST YR: ICD-10-PCS | Mod: CPTII,S$GLB,, | Performed by: NURSE PRACTITIONER

## 2021-09-08 PROCEDURE — 3008F BODY MASS INDEX DOCD: CPT | Mod: CPTII,S$GLB,, | Performed by: NURSE PRACTITIONER

## 2021-09-08 PROCEDURE — 1101F PT FALLS ASSESS-DOCD LE1/YR: CPT | Mod: CPTII,S$GLB,, | Performed by: NURSE PRACTITIONER

## 2021-09-08 PROCEDURE — 99214 OFFICE O/P EST MOD 30 MIN: CPT | Mod: S$GLB,,, | Performed by: NURSE PRACTITIONER

## 2021-09-08 PROCEDURE — 3288F FALL RISK ASSESSMENT DOCD: CPT | Mod: CPTII,S$GLB,, | Performed by: NURSE PRACTITIONER

## 2021-09-08 PROCEDURE — 1160F RVW MEDS BY RX/DR IN RCRD: CPT | Mod: CPTII,S$GLB,, | Performed by: NURSE PRACTITIONER

## 2021-09-08 PROCEDURE — 3078F PR MOST RECENT DIASTOLIC BLOOD PRESSURE < 80 MM HG: ICD-10-PCS | Mod: CPTII,S$GLB,, | Performed by: NURSE PRACTITIONER

## 2021-09-08 PROCEDURE — 3074F SYST BP LT 130 MM HG: CPT | Mod: CPTII,S$GLB,, | Performed by: NURSE PRACTITIONER

## 2021-09-08 PROCEDURE — 99214 PR OFFICE/OUTPT VISIT, EST, LEVL IV, 30-39 MIN: ICD-10-PCS | Mod: S$GLB,,, | Performed by: NURSE PRACTITIONER

## 2021-09-08 PROCEDURE — 1159F PR MEDICATION LIST DOCUMENTED IN MEDICAL RECORD: ICD-10-PCS | Mod: CPTII,S$GLB,, | Performed by: NURSE PRACTITIONER

## 2021-09-08 PROCEDURE — 1159F MED LIST DOCD IN RCRD: CPT | Mod: CPTII,S$GLB,, | Performed by: NURSE PRACTITIONER

## 2021-09-08 RX ORDER — HYDROCORTISONE AND ACETIC ACID 20.75; 10.375 MG/ML; MG/ML
4 SOLUTION AURICULAR (OTIC) 2 TIMES DAILY
Qty: 10 ML | Refills: 0 | Status: SHIPPED | OUTPATIENT
Start: 2021-09-08 | End: 2021-09-18

## 2021-09-11 ENCOUNTER — OFFICE VISIT (OUTPATIENT)
Dept: URGENT CARE | Facility: CLINIC | Age: 71
End: 2021-09-11
Payer: MEDICARE

## 2021-09-11 VITALS
TEMPERATURE: 99 F | OXYGEN SATURATION: 97 % | BODY MASS INDEX: 33.64 KG/M2 | DIASTOLIC BLOOD PRESSURE: 71 MMHG | HEART RATE: 65 BPM | HEIGHT: 62 IN | WEIGHT: 182.81 LBS | SYSTOLIC BLOOD PRESSURE: 121 MMHG | RESPIRATION RATE: 18 BRPM

## 2021-09-11 DIAGNOSIS — H60.91 OTITIS EXTERNA OF RIGHT EAR, UNSPECIFIED CHRONICITY, UNSPECIFIED TYPE: ICD-10-CM

## 2021-09-11 DIAGNOSIS — J02.9 SORE THROAT: ICD-10-CM

## 2021-09-11 DIAGNOSIS — J34.89 RHINORRHEA: ICD-10-CM

## 2021-09-11 DIAGNOSIS — R05.9 COUGH: Primary | ICD-10-CM

## 2021-09-11 LAB
CTP QC/QA: YES
SARS-COV-2 RDRP RESP QL NAA+PROBE: NEGATIVE

## 2021-09-11 PROCEDURE — 1159F MED LIST DOCD IN RCRD: CPT | Mod: CPTII,S$GLB,, | Performed by: NURSE PRACTITIONER

## 2021-09-11 PROCEDURE — 3008F PR BODY MASS INDEX (BMI) DOCUMENTED: ICD-10-PCS | Mod: CPTII,S$GLB,, | Performed by: NURSE PRACTITIONER

## 2021-09-11 PROCEDURE — 3078F DIAST BP <80 MM HG: CPT | Mod: CPTII,S$GLB,, | Performed by: NURSE PRACTITIONER

## 2021-09-11 PROCEDURE — 99214 PR OFFICE/OUTPT VISIT, EST, LEVL IV, 30-39 MIN: ICD-10-PCS | Mod: S$GLB,,, | Performed by: NURSE PRACTITIONER

## 2021-09-11 PROCEDURE — 1159F PR MEDICATION LIST DOCUMENTED IN MEDICAL RECORD: ICD-10-PCS | Mod: CPTII,S$GLB,, | Performed by: NURSE PRACTITIONER

## 2021-09-11 PROCEDURE — 3078F PR MOST RECENT DIASTOLIC BLOOD PRESSURE < 80 MM HG: ICD-10-PCS | Mod: CPTII,S$GLB,, | Performed by: NURSE PRACTITIONER

## 2021-09-11 PROCEDURE — U0002 COVID-19 LAB TEST NON-CDC: HCPCS | Mod: QW,S$GLB,, | Performed by: NURSE PRACTITIONER

## 2021-09-11 PROCEDURE — 3074F SYST BP LT 130 MM HG: CPT | Mod: CPTII,S$GLB,, | Performed by: NURSE PRACTITIONER

## 2021-09-11 PROCEDURE — 99214 OFFICE O/P EST MOD 30 MIN: CPT | Mod: S$GLB,,, | Performed by: NURSE PRACTITIONER

## 2021-09-11 PROCEDURE — 3008F BODY MASS INDEX DOCD: CPT | Mod: CPTII,S$GLB,, | Performed by: NURSE PRACTITIONER

## 2021-09-11 PROCEDURE — U0002: ICD-10-PCS | Mod: QW,S$GLB,, | Performed by: NURSE PRACTITIONER

## 2021-09-11 PROCEDURE — 3074F PR MOST RECENT SYSTOLIC BLOOD PRESSURE < 130 MM HG: ICD-10-PCS | Mod: CPTII,S$GLB,, | Performed by: NURSE PRACTITIONER

## 2021-09-11 RX ORDER — AZITHROMYCIN 250 MG/1
TABLET, FILM COATED ORAL
Qty: 6 TABLET | Refills: 0 | Status: SHIPPED | OUTPATIENT
Start: 2021-09-11 | End: 2022-05-13

## 2021-09-20 ENCOUNTER — OFFICE VISIT (OUTPATIENT)
Dept: FAMILY MEDICINE | Facility: CLINIC | Age: 71
End: 2021-09-20
Payer: MEDICARE

## 2021-09-20 VITALS
HEIGHT: 62 IN | OXYGEN SATURATION: 97 % | TEMPERATURE: 100 F | BODY MASS INDEX: 33.39 KG/M2 | SYSTOLIC BLOOD PRESSURE: 104 MMHG | HEART RATE: 71 BPM | DIASTOLIC BLOOD PRESSURE: 60 MMHG | WEIGHT: 181.44 LBS

## 2021-09-20 DIAGNOSIS — J30.89 SEASONAL ALLERGIC RHINITIS DUE TO OTHER ALLERGIC TRIGGER: ICD-10-CM

## 2021-09-20 DIAGNOSIS — F34.1 DYSTHYMIA: ICD-10-CM

## 2021-09-20 DIAGNOSIS — M70.61 TROCHANTERIC BURSITIS OF RIGHT HIP: Primary | ICD-10-CM

## 2021-09-20 PROCEDURE — 1159F MED LIST DOCD IN RCRD: CPT | Mod: CPTII,S$GLB,, | Performed by: INTERNAL MEDICINE

## 2021-09-20 PROCEDURE — 3074F SYST BP LT 130 MM HG: CPT | Mod: CPTII,S$GLB,, | Performed by: INTERNAL MEDICINE

## 2021-09-20 PROCEDURE — 3288F PR FALLS RISK ASSESSMENT DOCUMENTED: ICD-10-PCS | Mod: CPTII,S$GLB,, | Performed by: INTERNAL MEDICINE

## 2021-09-20 PROCEDURE — 1159F PR MEDICATION LIST DOCUMENTED IN MEDICAL RECORD: ICD-10-PCS | Mod: CPTII,S$GLB,, | Performed by: INTERNAL MEDICINE

## 2021-09-20 PROCEDURE — 3078F DIAST BP <80 MM HG: CPT | Mod: CPTII,S$GLB,, | Performed by: INTERNAL MEDICINE

## 2021-09-20 PROCEDURE — 3074F PR MOST RECENT SYSTOLIC BLOOD PRESSURE < 130 MM HG: ICD-10-PCS | Mod: CPTII,S$GLB,, | Performed by: INTERNAL MEDICINE

## 2021-09-20 PROCEDURE — 20610 DRAIN/INJ JOINT/BURSA W/O US: CPT | Mod: RT,S$GLB,, | Performed by: INTERNAL MEDICINE

## 2021-09-20 PROCEDURE — 20610 LARGE JOINT ASPIRATION/INJECTION: R GREATER TROCHANTERIC BURSA: ICD-10-PCS | Mod: RT,S$GLB,, | Performed by: INTERNAL MEDICINE

## 2021-09-20 PROCEDURE — 99213 PR OFFICE/OUTPT VISIT, EST, LEVL III, 20-29 MIN: ICD-10-PCS | Mod: 25,S$GLB,, | Performed by: INTERNAL MEDICINE

## 2021-09-20 PROCEDURE — 1101F PR PT FALLS ASSESS DOC 0-1 FALLS W/OUT INJ PAST YR: ICD-10-PCS | Mod: CPTII,S$GLB,, | Performed by: INTERNAL MEDICINE

## 2021-09-20 PROCEDURE — 3078F PR MOST RECENT DIASTOLIC BLOOD PRESSURE < 80 MM HG: ICD-10-PCS | Mod: CPTII,S$GLB,, | Performed by: INTERNAL MEDICINE

## 2021-09-20 PROCEDURE — 3008F BODY MASS INDEX DOCD: CPT | Mod: CPTII,S$GLB,, | Performed by: INTERNAL MEDICINE

## 2021-09-20 PROCEDURE — 3288F FALL RISK ASSESSMENT DOCD: CPT | Mod: CPTII,S$GLB,, | Performed by: INTERNAL MEDICINE

## 2021-09-20 PROCEDURE — 1160F PR REVIEW ALL MEDS BY PRESCRIBER/CLIN PHARMACIST DOCUMENTED: ICD-10-PCS | Mod: CPTII,S$GLB,, | Performed by: INTERNAL MEDICINE

## 2021-09-20 PROCEDURE — 3008F PR BODY MASS INDEX (BMI) DOCUMENTED: ICD-10-PCS | Mod: CPTII,S$GLB,, | Performed by: INTERNAL MEDICINE

## 2021-09-20 PROCEDURE — 1101F PT FALLS ASSESS-DOCD LE1/YR: CPT | Mod: CPTII,S$GLB,, | Performed by: INTERNAL MEDICINE

## 2021-09-20 PROCEDURE — 99213 OFFICE O/P EST LOW 20 MIN: CPT | Mod: 25,S$GLB,, | Performed by: INTERNAL MEDICINE

## 2021-09-20 PROCEDURE — 1160F RVW MEDS BY RX/DR IN RCRD: CPT | Mod: CPTII,S$GLB,, | Performed by: INTERNAL MEDICINE

## 2021-09-20 RX ORDER — METHYLPREDNISOLONE ACETATE 40 MG/ML
40 INJECTION, SUSPENSION INTRA-ARTICULAR; INTRALESIONAL; INTRAMUSCULAR; SOFT TISSUE
Status: DISCONTINUED | OUTPATIENT
Start: 2021-09-20 | End: 2021-09-20 | Stop reason: HOSPADM

## 2021-09-20 RX ADMIN — METHYLPREDNISOLONE ACETATE 40 MG: 40 INJECTION, SUSPENSION INTRA-ARTICULAR; INTRALESIONAL; INTRAMUSCULAR; SOFT TISSUE at 01:09

## 2021-11-10 ENCOUNTER — TELEPHONE (OUTPATIENT)
Dept: FAMILY MEDICINE | Facility: CLINIC | Age: 71
End: 2021-11-10
Payer: MEDICARE

## 2021-11-10 DIAGNOSIS — Z12.31 BREAST CANCER SCREENING BY MAMMOGRAM: Primary | ICD-10-CM

## 2021-11-18 ENCOUNTER — HOSPITAL ENCOUNTER (OUTPATIENT)
Dept: RADIOLOGY | Facility: CLINIC | Age: 71
Discharge: HOME | End: 2021-11-18
Attending: INTERNAL MEDICINE
Payer: COMMERCIAL

## 2021-11-18 DIAGNOSIS — Z12.31 BREAST CANCER SCREENING BY MAMMOGRAM: ICD-10-CM

## 2021-11-18 PROCEDURE — 77067 MAMMO DIGITAL SCREENING BILAT WITH TOMO: ICD-10-PCS | Mod: 26,,, | Performed by: RADIOLOGY

## 2021-11-18 PROCEDURE — 77063 MAMMO DIGITAL SCREENING BILAT WITH TOMO: ICD-10-PCS | Mod: 26,,, | Performed by: RADIOLOGY

## 2021-11-18 PROCEDURE — 77067 SCR MAMMO BI INCL CAD: CPT | Mod: TC,PO

## 2021-11-18 PROCEDURE — 77063 BREAST TOMOSYNTHESIS BI: CPT | Mod: 26,,, | Performed by: RADIOLOGY

## 2021-11-18 PROCEDURE — 77067 SCR MAMMO BI INCL CAD: CPT | Mod: 26,,, | Performed by: RADIOLOGY

## 2021-12-06 ENCOUNTER — TELEPHONE (OUTPATIENT)
Dept: FAMILY MEDICINE | Facility: CLINIC | Age: 71
End: 2021-12-06
Payer: MEDICARE

## 2021-12-08 ENCOUNTER — TELEPHONE (OUTPATIENT)
Dept: FAMILY MEDICINE | Facility: CLINIC | Age: 71
End: 2021-12-08
Payer: MEDICARE

## 2021-12-17 ENCOUNTER — LAB VISIT (OUTPATIENT)
Dept: LAB | Facility: HOSPITAL | Age: 71
End: 2021-12-17
Attending: INTERNAL MEDICINE
Payer: MEDICARE

## 2021-12-17 DIAGNOSIS — E78.5 HYPERLIPIDEMIA, UNSPECIFIED HYPERLIPIDEMIA TYPE: ICD-10-CM

## 2021-12-17 DIAGNOSIS — Z13.0 SCREENING FOR ENDOCRINE, METABOLIC AND IMMUNITY DISORDER: ICD-10-CM

## 2021-12-17 DIAGNOSIS — L29.9 PRURITUS, UNSPECIFIED: ICD-10-CM

## 2021-12-17 DIAGNOSIS — Z13.228 SCREENING FOR ENDOCRINE, METABOLIC AND IMMUNITY DISORDER: ICD-10-CM

## 2021-12-17 DIAGNOSIS — Z13.29 SCREENING FOR ENDOCRINE, METABOLIC AND IMMUNITY DISORDER: ICD-10-CM

## 2021-12-17 LAB
ALBUMIN SERPL BCP-MCNC: 3.6 G/DL (ref 3.5–5.2)
ALP SERPL-CCNC: 69 U/L (ref 55–135)
ALT SERPL W/O P-5'-P-CCNC: 11 U/L (ref 10–44)
ANION GAP SERPL CALC-SCNC: 9 MMOL/L (ref 8–16)
AST SERPL-CCNC: 17 U/L (ref 10–40)
BASOPHILS # BLD AUTO: 0.05 K/UL (ref 0–0.2)
BASOPHILS NFR BLD: 0.7 % (ref 0–1.9)
BILIRUB SERPL-MCNC: 0.5 MG/DL (ref 0.1–1)
BUN SERPL-MCNC: 19 MG/DL (ref 8–23)
CALCIUM SERPL-MCNC: 9.5 MG/DL (ref 8.7–10.5)
CHLORIDE SERPL-SCNC: 103 MMOL/L (ref 95–110)
CHOLEST SERPL-MCNC: 253 MG/DL (ref 120–199)
CHOLEST/HDLC SERPL: 7.4 {RATIO} (ref 2–5)
CO2 SERPL-SCNC: 29 MMOL/L (ref 23–29)
CREAT SERPL-MCNC: 0.8 MG/DL (ref 0.5–1.4)
DIFFERENTIAL METHOD: ABNORMAL
EOSINOPHIL # BLD AUTO: 0.2 K/UL (ref 0–0.5)
EOSINOPHIL NFR BLD: 2.3 % (ref 0–8)
ERYTHROCYTE [DISTWIDTH] IN BLOOD BY AUTOMATED COUNT: 13 % (ref 11.5–14.5)
EST. GFR  (AFRICAN AMERICAN): >60 ML/MIN/1.73 M^2
EST. GFR  (NON AFRICAN AMERICAN): >60 ML/MIN/1.73 M^2
GLUCOSE SERPL-MCNC: 84 MG/DL (ref 70–110)
HCT VFR BLD AUTO: 46.1 % (ref 37–48.5)
HDLC SERPL-MCNC: 34 MG/DL (ref 40–75)
HDLC SERPL: 13.4 % (ref 20–50)
HGB BLD-MCNC: 15.2 G/DL (ref 12–16)
IMM GRANULOCYTES # BLD AUTO: 0.03 K/UL (ref 0–0.04)
IMM GRANULOCYTES NFR BLD AUTO: 0.4 % (ref 0–0.5)
LDLC SERPL CALC-MCNC: 176 MG/DL (ref 63–159)
LYMPHOCYTES # BLD AUTO: 1.2 K/UL (ref 1–4.8)
LYMPHOCYTES NFR BLD: 16.6 % (ref 18–48)
MCH RBC QN AUTO: 28.8 PG (ref 27–31)
MCHC RBC AUTO-ENTMCNC: 33 G/DL (ref 32–36)
MCV RBC AUTO: 87 FL (ref 82–98)
MONOCYTES # BLD AUTO: 0.7 K/UL (ref 0.3–1)
MONOCYTES NFR BLD: 9.7 % (ref 4–15)
NEUTROPHILS # BLD AUTO: 5 K/UL (ref 1.8–7.7)
NEUTROPHILS NFR BLD: 70.3 % (ref 38–73)
NONHDLC SERPL-MCNC: 219 MG/DL
NRBC BLD-RTO: 0 /100 WBC
PLATELET # BLD AUTO: 177 K/UL (ref 150–450)
PMV BLD AUTO: 10.3 FL (ref 9.2–12.9)
POTASSIUM SERPL-SCNC: 4.1 MMOL/L (ref 3.5–5.1)
PROT SERPL-MCNC: 7.4 G/DL (ref 6–8.4)
RBC # BLD AUTO: 5.28 M/UL (ref 4–5.4)
SODIUM SERPL-SCNC: 141 MMOL/L (ref 136–145)
TRIGL SERPL-MCNC: 215 MG/DL (ref 30–150)
TSH SERPL DL<=0.005 MIU/L-ACNC: 1.56 UIU/ML (ref 0.4–4)
WBC # BLD AUTO: 7.04 K/UL (ref 3.9–12.7)

## 2021-12-17 PROCEDURE — 36415 COLL VENOUS BLD VENIPUNCTURE: CPT | Performed by: NURSE PRACTITIONER

## 2021-12-17 PROCEDURE — 80053 COMPREHEN METABOLIC PANEL: CPT | Performed by: NURSE PRACTITIONER

## 2021-12-17 PROCEDURE — 85025 COMPLETE CBC W/AUTO DIFF WBC: CPT | Performed by: NURSE PRACTITIONER

## 2021-12-17 PROCEDURE — 84443 ASSAY THYROID STIM HORMONE: CPT | Performed by: NURSE PRACTITIONER

## 2021-12-17 PROCEDURE — 80061 LIPID PANEL: CPT | Performed by: NURSE PRACTITIONER

## 2021-12-20 ENCOUNTER — OFFICE VISIT (OUTPATIENT)
Dept: FAMILY MEDICINE | Facility: CLINIC | Age: 71
End: 2021-12-20
Payer: MEDICARE

## 2021-12-20 VITALS
BODY MASS INDEX: 33.71 KG/M2 | WEIGHT: 183.19 LBS | HEART RATE: 66 BPM | SYSTOLIC BLOOD PRESSURE: 125 MMHG | TEMPERATURE: 97 F | HEIGHT: 62 IN | OXYGEN SATURATION: 98 % | DIASTOLIC BLOOD PRESSURE: 77 MMHG

## 2021-12-20 DIAGNOSIS — E78.5 HYPERLIPIDEMIA, UNSPECIFIED HYPERLIPIDEMIA TYPE: ICD-10-CM

## 2021-12-20 DIAGNOSIS — F34.1 DYSTHYMIA: Primary | ICD-10-CM

## 2021-12-20 PROCEDURE — 1157F ADVNC CARE PLAN IN RCRD: CPT | Mod: CPTII,S$GLB,, | Performed by: INTERNAL MEDICINE

## 2021-12-20 PROCEDURE — 99213 PR OFFICE/OUTPT VISIT, EST, LEVL III, 20-29 MIN: ICD-10-PCS | Mod: S$GLB,,, | Performed by: INTERNAL MEDICINE

## 2021-12-20 PROCEDURE — 1157F PR ADVANCE CARE PLAN OR EQUIV PRESENT IN MEDICAL RECORD: ICD-10-PCS | Mod: CPTII,S$GLB,, | Performed by: INTERNAL MEDICINE

## 2021-12-20 PROCEDURE — 99213 OFFICE O/P EST LOW 20 MIN: CPT | Mod: S$GLB,,, | Performed by: INTERNAL MEDICINE

## 2021-12-20 RX ORDER — ROSUVASTATIN CALCIUM 10 MG/1
TABLET, COATED ORAL
Qty: 30 TABLET | Refills: 0 | Status: SHIPPED | OUTPATIENT
Start: 2021-12-20 | End: 2022-04-12

## 2022-02-21 ENCOUNTER — OFFICE VISIT (OUTPATIENT)
Dept: DERMATOLOGY | Facility: CLINIC | Age: 72
End: 2022-02-21
Payer: MEDICARE

## 2022-02-21 DIAGNOSIS — L82.0 SEBORRHEIC KERATOSES, INFLAMED: ICD-10-CM

## 2022-02-21 DIAGNOSIS — L82.1 SEBORRHEIC KERATOSES: ICD-10-CM

## 2022-02-21 DIAGNOSIS — L57.0 ACTINIC KERATOSIS: Primary | ICD-10-CM

## 2022-02-21 DIAGNOSIS — D23.9 BLUE NEVUS: ICD-10-CM

## 2022-02-21 DIAGNOSIS — D18.01 CHERRY ANGIOMA: ICD-10-CM

## 2022-02-21 DIAGNOSIS — D22.9 BENIGN NEVUS: ICD-10-CM

## 2022-02-21 PROCEDURE — 17110 PR DESTRUCTION BENIGN LESIONS UP TO 14: ICD-10-PCS | Mod: S$GLB,,, | Performed by: STUDENT IN AN ORGANIZED HEALTH CARE EDUCATION/TRAINING PROGRAM

## 2022-02-21 PROCEDURE — 17003 DESTRUCTION, PREMALIGNANT LESIONS; SECOND THROUGH 14 LESIONS: ICD-10-PCS | Mod: XS,S$GLB,, | Performed by: STUDENT IN AN ORGANIZED HEALTH CARE EDUCATION/TRAINING PROGRAM

## 2022-02-21 PROCEDURE — 99203 PR OFFICE/OUTPT VISIT, NEW, LEVL III, 30-44 MIN: ICD-10-PCS | Mod: 25,S$GLB,, | Performed by: STUDENT IN AN ORGANIZED HEALTH CARE EDUCATION/TRAINING PROGRAM

## 2022-02-21 PROCEDURE — 17110 DESTRUCTION B9 LES UP TO 14: CPT | Mod: S$GLB,,, | Performed by: STUDENT IN AN ORGANIZED HEALTH CARE EDUCATION/TRAINING PROGRAM

## 2022-02-21 PROCEDURE — 17003 DESTRUCT PREMALG LES 2-14: CPT | Mod: XS,S$GLB,, | Performed by: STUDENT IN AN ORGANIZED HEALTH CARE EDUCATION/TRAINING PROGRAM

## 2022-02-21 PROCEDURE — 99999 PR PBB SHADOW E&M-EST. PATIENT-LVL III: CPT | Mod: PBBFAC,,, | Performed by: STUDENT IN AN ORGANIZED HEALTH CARE EDUCATION/TRAINING PROGRAM

## 2022-02-21 PROCEDURE — 99203 OFFICE O/P NEW LOW 30 MIN: CPT | Mod: 25,S$GLB,, | Performed by: STUDENT IN AN ORGANIZED HEALTH CARE EDUCATION/TRAINING PROGRAM

## 2022-02-21 PROCEDURE — 17000 DESTRUCT PREMALG LESION: CPT | Mod: XS,S$GLB,, | Performed by: STUDENT IN AN ORGANIZED HEALTH CARE EDUCATION/TRAINING PROGRAM

## 2022-02-21 PROCEDURE — 99999 PR PBB SHADOW E&M-EST. PATIENT-LVL III: ICD-10-PCS | Mod: PBBFAC,,, | Performed by: STUDENT IN AN ORGANIZED HEALTH CARE EDUCATION/TRAINING PROGRAM

## 2022-02-21 PROCEDURE — 17000 PR DESTRUCTION(LASER SURGERY,CRYOSURGERY,CHEMOSURGERY),PREMALIGNANT LESIONS,FIRST LESION: ICD-10-PCS | Mod: XS,S$GLB,, | Performed by: STUDENT IN AN ORGANIZED HEALTH CARE EDUCATION/TRAINING PROGRAM

## 2022-02-21 NOTE — PROGRESS NOTES
Subjective:       Patient ID:  Syeda Gonsalves is a 71 y.o. female who presents for   Chief Complaint   Patient presents with    Spot     Left cheek     New patient    Patient here today for spot on left cheek. Unknown how long. Dry and flaky. No treatment.      Denies Phx NMSC      Review of Systems   Constitutional: Negative for fever and chills.   Respiratory: Negative for cough and shortness of breath.    Gastrointestinal: Negative for nausea and vomiting.   Skin: Positive for daily sunscreen use and activity-related sunscreen use.   Hematologic/Lymphatic: Does not bruise/bleed easily.        Objective:    Physical Exam   Constitutional: She appears well-developed and well-nourished. No distress.   Neurological: She is alert and oriented to person, place, and time. She is not disoriented.   Psychiatric: She has a normal mood and affect.   Skin:   Areas Examined (abnormalities noted in diagram):   Scalp / Hair Palpated and Inspected  Head / Face Inspection Performed  Neck Inspection Performed  Chest / Axilla Inspection Performed  Abdomen Inspection Performed  Back Inspection Performed  RUE Inspected  LUE Inspection Performed  Nails and Digits Inspection Performed                   Diagram Legend     Erythematous scaling macule/papule c/w actinic keratosis       Vascular papule c/w angioma      Pigmented verrucoid papule/plaque c/w seborrheic keratosis      Yellow umbilicated papule c/w sebaceous hyperplasia      Irregularly shaped tan macule c/w lentigo     1-2 mm smooth white papules consistent with Milia      Movable subcutaneous cyst with punctum c/w epidermal inclusion cyst      Subcutaneous movable cyst c/w pilar cyst      Firm pink to brown papule c/w dermatofibroma      Pedunculated fleshy papule(s) c/w skin tag(s)      Evenly pigmented macule c/w junctional nevus     Mildly variegated pigmented, slightly irregular-bordered macule c/w mildly atypical nevus      Flesh colored to evenly pigmented  papule c/w intradermal nevus       Pink pearly papule/plaque c/w basal cell carcinoma      Erythematous hyperkeratotic cursted plaque c/w SCC      Surgical scar with no sign of skin cancer recurrence      Open and closed comedones      Inflammatory papules and pustules      Verrucoid papule consistent consistent with wart     Erythematous eczematous patches and plaques     Dystrophic onycholytic nail with subungual debris c/w onychomycosis     Umbilicated papule    Erythematous-base heme-crusted tan verrucoid plaque consistent with inflamed seborrheic keratosis     Erythematous Silvery Scaling Plaque c/w Psoriasis     See annotation      Assessment / Plan:        Actinic keratosis  Cryosurgery Procedure Note    Verbal consent from the patient is obtained and the patient is aware of the precancerous quality and need for treatment of these lesions. Liquid nitrogen cryosurgery is applied to the 4 actinic keratoses, as detailed in the physical exam, to produce a freeze injury. The patient is aware that blisters may form and is instructed on wound care with gentle cleansing and use of vaseline ointment to keep moist until healed. The patient is supplied a handout on cryosurgery and is instructed to call if lesions do not completely resolve.    Cherry angioma  This is a benign vascular lesion. Reassurance given. No treatment required.     Seborrheic keratoses  These are benign inherited growths without a malignant potential. Reassurance given to patient. No treatment is necessary.     Seborrheic keratoses, inflamed  - left neck , right forearm  - if lesion on right forearm does not resolve should rtc   Cryosurgery procedure note:    Verbal consent from the patient is obtained. Liquid nitrogen cryosurgery is applied to 2 lesions to produce a freeze injury. The patient is aware that blisters may form and is instructed on wound care with gentle cleansing and use of vaseline ointment to keep moist until healed. The patient is  supplied a handout on cryosurgery and is instructed to call if lesions do not completely resolve.    Benign nevus  Blue nevus  Careful dermoscopy evaluation of nevi performed with none identified as needing biopsy today  Monitor for new mole or moles that are becoming bigger, darker, irritated, or developing irregular borders.   Upper body skin examination performed today including at least 6 points as noted in physical examination. No lesions suspicious for malignancy noted.  Patient instructed in importance in daily broad spectrum sun protection of at least spf 30. Mineral sunscreen ingredients preferred (Zinc +/- Titanium) and can be found OTC.   Patient encouraged to wear hat for all outdoor exposure.   Also discussed sun avoidance and use of protective clothing.            1 year or sooner    No follow-ups on file.

## 2022-02-21 NOTE — PATIENT INSTRUCTIONS

## 2022-03-15 ENCOUNTER — LAB VISIT (OUTPATIENT)
Dept: LAB | Facility: HOSPITAL | Age: 72
End: 2022-03-15
Attending: INTERNAL MEDICINE
Payer: MEDICARE

## 2022-03-15 DIAGNOSIS — E78.5 HYPERLIPIDEMIA, UNSPECIFIED HYPERLIPIDEMIA TYPE: ICD-10-CM

## 2022-03-15 LAB
ALBUMIN SERPL BCP-MCNC: 3.6 G/DL (ref 3.5–5.2)
ALP SERPL-CCNC: 64 U/L (ref 55–135)
ALT SERPL W/O P-5'-P-CCNC: 12 U/L (ref 10–44)
ANION GAP SERPL CALC-SCNC: 7 MMOL/L (ref 8–16)
AST SERPL-CCNC: 17 U/L (ref 10–40)
BILIRUB SERPL-MCNC: 0.4 MG/DL (ref 0.1–1)
BUN SERPL-MCNC: 18 MG/DL (ref 8–23)
CALCIUM SERPL-MCNC: 9.5 MG/DL (ref 8.7–10.5)
CHLORIDE SERPL-SCNC: 104 MMOL/L (ref 95–110)
CHOLEST SERPL-MCNC: 232 MG/DL (ref 120–199)
CHOLEST/HDLC SERPL: 6.4 {RATIO} (ref 2–5)
CO2 SERPL-SCNC: 30 MMOL/L (ref 23–29)
CREAT SERPL-MCNC: 0.7 MG/DL (ref 0.5–1.4)
EST. GFR  (AFRICAN AMERICAN): >60 ML/MIN/1.73 M^2
EST. GFR  (NON AFRICAN AMERICAN): >60 ML/MIN/1.73 M^2
GLUCOSE SERPL-MCNC: 84 MG/DL (ref 70–110)
HDLC SERPL-MCNC: 36 MG/DL (ref 40–75)
HDLC SERPL: 15.5 % (ref 20–50)
LDLC SERPL CALC-MCNC: 156.4 MG/DL (ref 63–159)
NONHDLC SERPL-MCNC: 196 MG/DL
POTASSIUM SERPL-SCNC: 4.5 MMOL/L (ref 3.5–5.1)
PROT SERPL-MCNC: 7.2 G/DL (ref 6–8.4)
SODIUM SERPL-SCNC: 141 MMOL/L (ref 136–145)
TRIGL SERPL-MCNC: 198 MG/DL (ref 30–150)

## 2022-03-15 PROCEDURE — 36415 COLL VENOUS BLD VENIPUNCTURE: CPT | Mod: PO | Performed by: INTERNAL MEDICINE

## 2022-03-15 PROCEDURE — 80053 COMPREHEN METABOLIC PANEL: CPT | Performed by: INTERNAL MEDICINE

## 2022-03-15 PROCEDURE — 80061 LIPID PANEL: CPT | Performed by: INTERNAL MEDICINE

## 2022-04-01 ENCOUNTER — TELEPHONE (OUTPATIENT)
Dept: FAMILY MEDICINE | Facility: CLINIC | Age: 72
End: 2022-04-01
Payer: MEDICARE

## 2022-04-01 NOTE — TELEPHONE ENCOUNTER
----- Message from Mirna Short sent at 4/1/2022  2:58 PM CDT -----  Contact: patient  Type: Needs Medical Advice  Who Called:  patient  Best Call Back Number: [phone] (home)   Additional Information: patient is needing to reschedule the appt on 4/7 for either earlier the same day or change to a different day.

## 2022-04-12 ENCOUNTER — OFFICE VISIT (OUTPATIENT)
Dept: FAMILY MEDICINE | Facility: CLINIC | Age: 72
End: 2022-04-12
Payer: MEDICARE

## 2022-04-12 VITALS
DIASTOLIC BLOOD PRESSURE: 78 MMHG | HEIGHT: 62 IN | TEMPERATURE: 98 F | WEIGHT: 182.38 LBS | OXYGEN SATURATION: 98 % | HEART RATE: 71 BPM | BODY MASS INDEX: 33.56 KG/M2 | SYSTOLIC BLOOD PRESSURE: 132 MMHG

## 2022-04-12 DIAGNOSIS — E78.2 MIXED HYPERLIPIDEMIA: ICD-10-CM

## 2022-04-12 DIAGNOSIS — R29.898 RIGHT LEG WEAKNESS: Primary | ICD-10-CM

## 2022-04-12 DIAGNOSIS — E78.5 HYPERLIPIDEMIA, UNSPECIFIED HYPERLIPIDEMIA TYPE: ICD-10-CM

## 2022-04-12 PROCEDURE — 99214 OFFICE O/P EST MOD 30 MIN: CPT | Mod: S$GLB,,, | Performed by: FAMILY MEDICINE

## 2022-04-12 PROCEDURE — 99214 PR OFFICE/OUTPT VISIT, EST, LEVL IV, 30-39 MIN: ICD-10-PCS | Mod: S$GLB,,, | Performed by: FAMILY MEDICINE

## 2022-04-12 NOTE — PROGRESS NOTES
Subjective:       Patient ID: Syeda Gonsalves is a 71 y.o. female.    Chief Complaint: Establish Care, Hyperlipidemia (Pt mentions due to gi upset/constipation stopped the medication couple weeks ago.), Anxiety (Controlled with her current medication), Depression (Controlled with her current medication), Sore Throat (mild), and Nasal Congestion (Clear. With sneezing. Using flonase w/o much relief)    Patient is a 71 year old female with history of bilateral hip pain and knee painfrom osteoarhritis, mixed hyperlipidemia with statin intolerance presenting for routine health maintenance and to establish care, patient endorses compliance with medication regimen and has allergies.         Current Outpatient Medications:     clindamycin phosphate 1% (CLINDAGEL) 1 % gel, Apply topically 2 (two) times daily., Disp: 60 g, Rfl: 3    co-enzyme Q-10 30 mg capsule, Take 30 mg by mouth once daily., Disp: , Rfl:     diclofenac sodium (VOLTAREN) 1 % Gel, Apply 2 g topically once daily., Disp: 100 g, Rfl: 3    fluticasone (FLONASE) 50 mcg/actuation nasal spray, 2 sprays (100 mcg total) by Each Nare route once daily., Disp: 16 g, Rfl: 11    gabapentin (NEURONTIN) 300 MG capsule, 1 p.o. in the morning 1 p.o. in the afternoon and 2 p.o. at night (Patient taking differently: daily as needed.), Disp: 120 capsule, Rfl: 11    GARLIC (GARLIQUE ORAL), Take 1 capsule by mouth once daily., Disp: , Rfl:     MULTIVITAMIN (MULTIPLE VITAMINS ORAL), Take by mouth., Disp: , Rfl:     red yeast rice 600 mg Cap, Take 2 capsules by mouth every evening., Disp: , Rfl:     sertraline (ZOLOFT) 100 MG tablet, Take 1.5 tablets by mouth once daily. 125mg daily, Disp: , Rfl:     albuterol (VENTOLIN HFA) 90 mcg/actuation inhaler, Inhale 2 puffs into the lungs every 6 (six) hours as needed for Wheezing. Rescue, Disp: 18 g, Rfl: 0    amitriptyline (ELAVIL) 10 MG tablet, Take 1 tablet (10 mg total) by mouth nightly as needed for Insomnia., Disp: 30  tablet, Rfl: 1    azithromycin (Z-DIGNA) 250 MG tablet, Take 2 tablets by mouth on day 1; Take 1 tablet by mouth on days 2-5 (Patient not taking: No sig reported), Disp: 6 tablet, Rfl: 0    evolocumab (REPATHA PUSHTRONEX) 420 mg/3.5 mL Injt, Inject 3.5 mLs (420 mg total) into the skin every 30 days., Disp: 3.5 mL, Rfl: 5    nitroGLYCERIN (NITROSTAT) 0.4 MG SL tablet, Place 1 tablet (0.4 mg total) under the tongue every 5 (five) minutes as needed for Chest pain., Disp: 20 tablet, Rfl: 1    Review of patient's allergies indicates:   Allergen Reactions    Flu vaccine 2011 (36 mos+)(pf) Anaphylaxis    Iodine and iodide containing products     Lipitor [atorvastatin]     Niacin preparations     Statins-hmg-coa reductase inhibitors Hives    Welchol [colesevelam] Other (See Comments)     GI upset and insomnia    Zetia [ezetimibe] Nausea And Vomiting       Past Medical History:   Diagnosis Date    Abnormal Pap smear 1990    Allergy to statin medication     Anxiety     Arthritis     Asthma     Back pain     Cervical cancer     Depression     GERD (gastroesophageal reflux disease)     H/O head and neck radiation 1/11/2017    Hyperlipidemia     Kidney insufficiency     Multinodular goiter 1/11/2017    Obesity     Smoking 1/11/2017    Uterine cancer        Past Surgical History:   Procedure Laterality Date    ADENOIDECTOMY      ADRENALECTOMY      CARPAL TUNNEL RELEASE      CHOLECYSTECTOMY      COLONOSCOPY N/A 12/21/2017    Procedure: COLONOSCOPY;  Surgeon: Harriett Pereyra MD;  Location: [facility];  Service: Endoscopy;  Laterality: N/A;    ESOPHAGOGASTRODUODENOSCOPY N/A 7/18/2018    Procedure: EGD (ESOPHAGOGASTRODUODENOSCOPY);  Surgeon: Harriett Pereyra MD;  Location: [facility];  Service: Endoscopy;  Laterality: N/A;    EYE SURGERY      HYSTERECTOMY      OOPHORECTOMY      TONSILLECTOMY         Family History   Problem Relation Age of Onset    Heart disease Mother         MI    Heart  "disease Father         MI    Hyperlipidemia Brother     ANTHONY disease Daughter     Diabetes Son     ANTHONY disease Maternal Grandmother     Bladder Cancer Paternal Grandmother     Heart disease Paternal Grandfather         MI    Breast cancer Neg Hx     Colon cancer Neg Hx     Ovarian cancer Neg Hx     Cancer Neg Hx        Social History     Tobacco Use    Smoking status: Former Smoker     Packs/day: 0.25     Years: 20.00     Pack years: 5.00     Types: Cigarettes     Quit date: 2017     Years since quittin.3    Smokeless tobacco: Never Used   Substance Use Topics    Alcohol use: No    Drug use: No       Review of Systems   Constitutional: Negative for activity change, appetite change, chills, diaphoresis, fatigue, fever and unexpected weight change.   HENT: Negative for hearing loss, postnasal drip, rhinorrhea, sinus pressure/congestion, sore throat and trouble swallowing.    Eyes: Negative for visual disturbance.   Respiratory: Negative for apnea, chest tightness, shortness of breath and wheezing.    Gastrointestinal: Negative for abdominal pain, blood in stool, change in bowel habit, constipation, diarrhea, nausea, vomiting and change in bowel habit.   Endocrine: Negative for polydipsia and polyphagia.   Genitourinary: Negative for dysuria, enuresis, flank pain, frequency and urgency.   Musculoskeletal: Positive for myalgias.   Integumentary:  Negative for rash and mole/lesion.   Neurological: Negative for dizziness, light-headedness, headaches and memory loss.   Psychiatric/Behavioral: Negative for confusion, decreased concentration, sleep disturbance and suicidal ideas. The patient is not nervous/anxious.            Objective:      Vitals:    22 1004   BP: 132/78   BP Location: Right arm   Patient Position: Sitting   BP Method: Large (Manual)   Pulse: 71   Temp: 97.7 °F (36.5 °C)   TempSrc: Temporal   SpO2: 98%   Weight: 82.7 kg (182 lb 6.4 oz)   Height: 5' 2" (1.575 m)     Physical " Exam  Constitutional:       General: She is not in acute distress.     Appearance: She is normal weight. She is not ill-appearing, toxic-appearing or diaphoretic.   HENT:      Head: Normocephalic and atraumatic.      Right Ear: External ear normal.      Left Ear: External ear normal.      Nose: Nose normal. No congestion.      Mouth/Throat:      Mouth: Mucous membranes are moist.      Pharynx: Oropharynx is clear. No oropharyngeal exudate or posterior oropharyngeal erythema.   Eyes:      General: No scleral icterus.     Extraocular Movements: Extraocular movements intact.      Conjunctiva/sclera: Conjunctivae normal.   Cardiovascular:      Rate and Rhythm: Normal rate and regular rhythm.      Pulses: Normal pulses.      Heart sounds: Normal heart sounds. No murmur heard.    No friction rub. No gallop.   Pulmonary:      Effort: Pulmonary effort is normal. No respiratory distress.      Breath sounds: Normal breath sounds. No stridor. No wheezing, rhonchi or rales.   Chest:      Chest wall: No tenderness.   Abdominal:      General: Abdomen is flat. Bowel sounds are normal. There is no distension.      Palpations: Abdomen is soft. There is no mass.      Tenderness: There is no abdominal tenderness. There is no guarding or rebound.      Hernia: No hernia is present.   Musculoskeletal:      Cervical back: Normal range of motion.      Right lower leg: No edema.      Left lower leg: No edema.   Skin:     Coloration: Skin is not jaundiced or pale.      Findings: No bruising, erythema or rash.   Neurological:      General: No focal deficit present.      Mental Status: She is alert and oriented to person, place, and time. Mental status is at baseline.   Psychiatric:         Mood and Affect: Mood normal.         Behavior: Behavior normal.         Thought Content: Thought content normal.         Judgment: Judgment normal.           Assessment:       1. Right leg weakness    2. Hyperlipidemia, unspecified hyperlipidemia type     3. Mixed hyperlipidemia        Plan:           Right leg weakness  -     Ambulatory referral/consult to Physical Medicine Rehab; Future; Expected date: 04/19/2022    Hyperlipidemia, unspecified hyperlipidemia type    Mixed hyperlipidemia  -     evolocumab (REPATHA PUSHTRONEX) 420 mg/3.5 mL Injt; Inject 3.5 mLs (420 mg total) into the skin every 30 days.  Dispense: 3.5 mL; Refill: 5  -     Comprehensive Metabolic Panel; Future; Expected date: 07/12/2022  -     Lipid Panel; Future; Expected date: 07/12/2022        Follow up in about 3 months (around 7/12/2022).    Rox Moses MD         DISCLAIMER: This note was prepared with [employer] Naturally Speaking voice recognition transcription software. Garbled syntax, mangled pronouns, and other bizarre constructions may be attributed to that software system.

## 2022-04-12 NOTE — PATIENT INSTRUCTIONS
Tariq Landa,     If you are due for any health screening(s) below please notify me so we can arrange them to be ordered and scheduled to maintain your health. Most healthy patients complete it. Don't lose out on improving your health.     Health Maintenance   Topic Date Due    Mammogram  11/18/2022    Lipid Panel  03/15/2023    DEXA Scan  05/22/2023    TETANUS VACCINE  05/08/2024    Hepatitis C Screening  Completed

## 2022-05-09 ENCOUNTER — OFFICE VISIT (OUTPATIENT)
Dept: FAMILY MEDICINE | Facility: CLINIC | Age: 72
End: 2022-05-09
Payer: MEDICARE

## 2022-05-09 VITALS
SYSTOLIC BLOOD PRESSURE: 122 MMHG | HEIGHT: 62 IN | HEART RATE: 62 BPM | DIASTOLIC BLOOD PRESSURE: 68 MMHG | TEMPERATURE: 98 F | WEIGHT: 183.88 LBS | BODY MASS INDEX: 33.84 KG/M2 | OXYGEN SATURATION: 98 %

## 2022-05-09 DIAGNOSIS — F32.4 MAJOR DEPRESSIVE DISORDER IN PARTIAL REMISSION, UNSPECIFIED WHETHER RECURRENT: ICD-10-CM

## 2022-05-09 DIAGNOSIS — Z00.00 ENCOUNTER FOR PREVENTIVE HEALTH EXAMINATION: Primary | ICD-10-CM

## 2022-05-09 DIAGNOSIS — R26.9 ABNORMALITY OF GAIT AND MOBILITY: ICD-10-CM

## 2022-05-09 DIAGNOSIS — H92.01 RIGHT EAR PAIN: ICD-10-CM

## 2022-05-09 PROCEDURE — 99999 PR PBB SHADOW E&M-EST. PATIENT-LVL V: CPT | Mod: PBBFAC,,, | Performed by: NURSE PRACTITIONER

## 2022-05-09 PROCEDURE — G0439 PR MEDICARE ANNUAL WELLNESS SUBSEQUENT VISIT: ICD-10-PCS | Mod: S$GLB,,, | Performed by: NURSE PRACTITIONER

## 2022-05-09 PROCEDURE — 99999 PR PBB SHADOW E&M-EST. PATIENT-LVL V: ICD-10-PCS | Mod: PBBFAC,,, | Performed by: NURSE PRACTITIONER

## 2022-05-09 PROCEDURE — G0439 PPPS, SUBSEQ VISIT: HCPCS | Mod: S$GLB,,, | Performed by: NURSE PRACTITIONER

## 2022-05-09 NOTE — PATIENT INSTRUCTIONS
Counseling and Referral of Other Preventative  (Italic type indicates deductible and co-insurance are waived)    Patient Name: Syeda Gonsalves  Today's Date: 5/9/2022    Health Maintenance       Date Due Completion Date    COVID-19 Vaccine (4 - Booster for Moderna series) 02/28/2022 10/28/2021    Mammogram 11/18/2022 11/18/2021    Colorectal Cancer Screening 12/21/2022 2/8/2019    Override on 1/22/2017: Done (sent to scans,)    Lipid Panel 03/15/2023 3/15/2022    DEXA Scan 05/22/2023 5/22/2020    TETANUS VACCINE 05/08/2024 5/8/2014        Orders Placed This Encounter   Procedures    Ambulatory referral/consult to ENT     The following information is provided to all patients.  This information is to help you find resources for any of the problems found today that may be affecting your health:                Living healthy guide: www.[facility].[place].[facility]      Understanding Diabetes: www.diabetes.org      Eating healthy: www.cdc.gov/healthyweight      CDC home safety checklist: www.cdc.gov/steadi/patient.html      Agency on Aging: www.goea.[place].[facility]      Alcoholics anonymous (AA): www.aa.org      Physical Activity: www.hussain.nih.gov/od3bipu      Tobacco use: www.quitwithusla.org

## 2022-05-09 NOTE — PROGRESS NOTES
"  Syeda Gonsalves presented for a  Medicare AWV and comprehensive Health Risk Assessment today. The following components were reviewed and updated:    · Medical history  · Family History  · Social history  · Allergies and Current Medications  · Health Risk Assessment  · Health Maintenance  · Care Team         ** See Completed Assessments for Annual Wellness Visit within the encounter summary.**         The following assessments were completed:  · Living Situation  · CAGE  · Depression Screening  · Timed Get Up and Go  · Whisper Test  · Cognitive Function Screening  · Nutrition Screening  · ADL Screening  · PAQ Screening            Vitals:    05/09/22 0952   BP: 122/68   Pulse: 62   Temp: 98.1 °F (36.7 °C)   SpO2: 98%   Weight: 83.4 kg (183 lb 13.8 oz)   Height: 5' 2" (1.575 m)     Body mass index is 33.63 kg/m².  Physical Exam  Constitutional:       Appearance: She is well-developed.   HENT:      Head: Normocephalic and atraumatic.      Nose: Nose normal.   Eyes:      General: Lids are normal.      Conjunctiva/sclera: Conjunctivae normal.      Pupils: Pupils are equal, round, and reactive to light.   Cardiovascular:      Rate and Rhythm: Normal rate.   Pulmonary:      Effort: Pulmonary effort is normal.   Musculoskeletal:      Cervical back: Full passive range of motion without pain.   Skin:     General: Skin is warm and dry.   Neurological:      Mental Status: She is alert and oriented to person, place, and time.   Psychiatric:         Speech: Speech normal.         Behavior: Behavior normal.               Diagnoses and health risks identified today and associated recommendations/orders:    1. Encounter for preventive health examination  Discussed health maintenance guidelines appropriate for age.        2. Major depressive disorder in partial remission, unspecified whether recurrent  Stable, continue current medication regimen  Followed by psychiatry with [facility]       3. Right ear " pain    - Ambulatory referral/consult to ENT; Future    4. Abnormality of gait and mobility  Stable, continue current       Provided Syeda with a 5-10 year written screening schedule and personal prevention plan. Recommendations were developed using the USPSTF age appropriate recommendations. Education, counseling, and referrals were provided as needed. After Visit Summary printed and given to patient which includes a list of additional screenings\tests needed.    Follow up for One year for Annual Wellness Visit.    Raisa Russ NP    I offered to discuss advanced care planning, including how to pick a person who would make decisions for you if you were unable to make them for yourself, called a health care power of , and what kind of decisions you might make such as use of life sustaining treatments such as ventilators and tube feeding when faced with a life limiting illness recorded on a living will that they will need to know. (How you want to be cared for as you near the end of your natural life)     X  Patient has advanced directives on file, which we reviewed, and they do not wish to make changes.

## 2022-05-12 ENCOUNTER — PES CALL (OUTPATIENT)
Dept: ADMINISTRATIVE | Facility: CLINIC | Age: 72
End: 2022-05-12
Payer: MEDICARE

## 2022-05-13 ENCOUNTER — OFFICE VISIT (OUTPATIENT)
Dept: OTOLARYNGOLOGY | Facility: CLINIC | Age: 72
End: 2022-05-13
Payer: MEDICARE

## 2022-05-13 VITALS — TEMPERATURE: 98 F | WEIGHT: 183.88 LBS | HEIGHT: 62 IN | BODY MASS INDEX: 33.84 KG/M2

## 2022-05-13 DIAGNOSIS — H92.01 RIGHT EAR PAIN: ICD-10-CM

## 2022-05-13 DIAGNOSIS — M26.629 TMJPDS (TEMPOROMANDIBULAR JOINT PAIN DYSFUNCTION SYNDROME): ICD-10-CM

## 2022-05-13 DIAGNOSIS — L29.9 ITCHING OF EAR: Primary | ICD-10-CM

## 2022-05-13 DIAGNOSIS — H91.90 HEARING LOSS, UNSPECIFIED HEARING LOSS TYPE, UNSPECIFIED LATERALITY: ICD-10-CM

## 2022-05-13 PROCEDURE — 99203 PR OFFICE/OUTPT VISIT, NEW, LEVL III, 30-44 MIN: ICD-10-PCS | Mod: S$GLB,,, | Performed by: OTOLARYNGOLOGY

## 2022-05-13 PROCEDURE — 99999 PR PBB SHADOW E&M-EST. PATIENT-LVL IV: ICD-10-PCS | Mod: PBBFAC,,, | Performed by: OTOLARYNGOLOGY

## 2022-05-13 PROCEDURE — 99999 PR PBB SHADOW E&M-EST. PATIENT-LVL IV: CPT | Mod: PBBFAC,,, | Performed by: OTOLARYNGOLOGY

## 2022-05-13 PROCEDURE — 99203 OFFICE O/P NEW LOW 30 MIN: CPT | Mod: S$GLB,,, | Performed by: OTOLARYNGOLOGY

## 2022-05-13 RX ORDER — LANOLIN ALCOHOL/MO/W.PET/CERES
400 CREAM (GRAM) TOPICAL DAILY
COMMUNITY

## 2022-05-13 RX ORDER — SERTRALINE HYDROCHLORIDE 25 MG/1
50 TABLET, FILM COATED ORAL DAILY
COMMUNITY
Start: 2022-04-14

## 2022-05-13 RX ORDER — FLUOCINOLONE ACETONIDE 0.11 MG/ML
OIL AURICULAR (OTIC)
Qty: 20 ML | Refills: 0 | Status: SHIPPED | OUTPATIENT
Start: 2022-05-13

## 2022-05-13 NOTE — PATIENT INSTRUCTIONS
DERMOTIC/FLUOCINOLONE OIL    Use prescribed fluocinolone/derm otic oil to both ears smearing around the outer ear scaling areas as well as down in the ear canal twice daily for a week no more than 2. At this point follow a routine ear care as outlined.  If not improved with this dose of steroid a higher concentration steroid may prove necessary.      ROUTINE EAR CARE    Keep the ears dry in general.  Water and soap dry the ears increase scaling by stripping away the natural oils of the ears.    If water gets stuck in the ear a twisted tissue can be used on occasion or the water can be displaced with concentrated alcohol like swimming ear or 72-95% isopropyl alcohol.  This will of course further dry the ears.    The ear should be kept moist in general with mineral oil 3 or 4 drops 2 to 3 times a week or even every night.    If the ears need to be irrigated use either a 50 50 mixture of white vinegar and distilled water or 50 50 mixture of alcohol and white vinegar.    Painful draining years it do not resolve with conservative care could represent secondary infection and may need debridement/clearing of the wax, and topical antibiotic drops with or without steroids.    TMJ Syndrome / Sprain    This is a condition with chronic or recurrent pain in the joint of the jaw (in front of the ear). Just like all other joints in the body (knees, hips, etc.) the jaw joint can be sprained or chronically injured over time. The jaw joint capsule can wear out just like other joints in the body.    The pain may cause limited motion of the jaw, a locking or catching sensation, clicking, popping, or grinding sounds from the joint with movement. It is a very common cause of headache, earache or neck pain. Other symptoms include ringing in the ear, and pressure/fullness of the ear.    The nerve that gives sensation to the jaw joint also gives sensation to the ear and part of the face. This is why pain in the face or ear can be coming  from the jaw joint. It is sometimes caused by inflammation in the joint, injury or wear-and-tear of the cartilage in the joint, involuntary grinding of the teeth or poorly fitting dentures. Emotional stress and tension are often a factor. Most cases resolve completely within a few months with proper treatment.    Home Care:  1) Rest the jaw by avoiding crunchy or hard foods to chew. Do not eat hard or sticky candies. Soft foods and liquids are easier on the jaw. Protect your jaw while yawning.  2) Hot compress (small towel soaked in hot water or heating pad) applied to the jaw may give relief by reducing muscle spasm. Some people get relief with cold packs, so try both and see which one works best for you.  3) You may use ibuprofen (Motrin, Advil) to control pain, unless another medicine was prescribed.   If you weight between 130 and 150 lb, you may take 600 mg of Ibuprofen every 6 hours as needed for a few weeks, then less frequently following this. If you weight > 150 lb, you may take 800 mg every 6 hours for the same time period. Extended use of Ibuprofen is typically OK, but not every 6 hours (usually one or two times per day.)   [ NOTE : If you have chronic liver or kidney disease or ever had a stomach ulcer or GI bleeding, talk with your doctor before using these medicines.]  4) If you suspect emotional stress is related to your condition.  a) Try to identify the sources of stress in your life. It may not be obvious! These may include:  -- Daily hassles of life that pile up (traffic jams, missed appointments, car troubles)  -- Major life changes, both good (new baby, job promotion) and bad (loss of job, loss of loved one)  -- Overload: feeling that you have too many responsibilities and can't take care of everything at once  -- Helplessness: feeling like your problems are more than you can solve  b) When possible, do something about the source of your stress: avoid hassles, limit the amount of change that is  "happening in your life at one time and take a break when you feel overloaded.  c) Unfortunately, many stressful situations cannot be avoided. Therefore, it is necessary to learn HOW TO MANAGE STRESS better. There are many proven methods that work and will reduce your anxiety. These include simple things like exercise, good nutrition and adequate rest. Also, there are certain techniques that are helpful: relaxation and breathing exercises, visualization, biofeedback, meditation or simply taking some time-out to clear your mind. For more information about this, consult your doctor or go to a local bookstore and review the many books and tapes available on this subject.    Mouthguards for Grinding:  Some TMJ issues are worsened by nightly teeth grinding. This can create muscle tension and strain on the jaw joint. Most mouthguards protect the teeth and do NOT reduce the clenching. If the goal is to reduce clenching, I recommend trying an over the counter nightguard called "SmartGuard."  This can be purchased over-the-counter and is available through vendors such as [employer] and [employer].   This mouthguard fits only on the front teeth. As a result, it prevents your molars from contacting, preventing a forceful clench. This should be worn for brief period of time (weeks or months) as it can alter the bite with prolonged use. Most people will notice improvement during this time.    "

## 2022-05-13 NOTE — PROGRESS NOTES
"[facility]    Subjective:      Patient: Syeda Gonsalves Patient PCP: Rox Moses MD         :  1950     Sex:  female      MRN:  [id number]          Date of Visit: 2022      Chief Complaint: Ear Fullness (Right worse than left. States that right ear "feels wrong" and has some pain. For about 3 weeks. Wants both ears checked for wax as well. )      Patient ID: Syeda Gonsalves is a 71 y.o. female former smoker with previous H&N radiation referred to me by Raisa Russ in consultation for ear fullness and right discomfort.  No otorrhea. Thinks she "might have TMJ" on the right side.  Denies bruxism.    Review of Systems   Constitutional: Negative.    HENT: Positive for ear pain and hearing loss. Negative for ear discharge.    Eyes: Negative.    Respiratory: Negative.    Cardiovascular: Negative.    Gastrointestinal: Negative.    Endocrine: Negative.    Genitourinary: Negative.    Musculoskeletal: Negative.    Allergic/Immunologic: Negative.    Neurological: Negative.    Hematological: Negative.    Psychiatric/Behavioral: Negative.         Past Medical History  She has a past medical history of Abnormal Pap smear, Allergy to statin medication, Anxiety, Arthritis, Asthma, Back pain, Cervical cancer, Depression, GERD (gastroesophageal reflux disease), H/O head and neck radiation, Hyperlipidemia, Kidney insufficiency, Multinodular goiter, Obesity, Smoking, and Uterine cancer.    Family / Surgical / Social History  Her family history includes Bladder Cancer in her paternal grandmother; Diabetes in her son; ANTHONY disease in her daughter and maternal grandmother; Heart disease in her father, mother, and paternal grandfather; Hyperlipidemia in her brother.    Past Surgical History:   Procedure Laterality Date    ADENOIDECTOMY      ADRENALECTOMY      CARPAL TUNNEL RELEASE      CHOLECYSTECTOMY      COLONOSCOPY N/A 2017    Procedure: COLONOSCOPY;  Surgeon: Harriett Pereyra MD;  [facility]" ENDO;  Service: Endoscopy;  Laterality: N/A;    ESOPHAGOGASTRODUODENOSCOPY N/A 2018    Procedure: EGD (ESOPHAGOGASTRODUODENOSCOPY);  Surgeon: Harriett Pereyra MD;  Location: [facility];  Service: Endoscopy;  Laterality: N/A;    EYE SURGERY      HYSTERECTOMY      OOPHORECTOMY      TONSILLECTOMY         Social History     Tobacco Use    Smoking status: Former Smoker     Packs/day: 0.25     Years: 20.00     Pack years: 5.00     Types: Cigarettes     Quit date: 2017     Years since quittin.3    Smokeless tobacco: Never Used   Substance and Sexual Activity    Alcohol use: No    Drug use: No    Sexual activity: Yes     Partners: Male       Medications  She has a current medication list which includes the following prescription(s): albuterol, amitriptyline, magnesium oxide, clindamycin phosphate 1%, co-enzyme q-10, diclofenac sodium, evolocumab, fluticasone propionate, gabapentin, garlic, multivitamin, nitroglycerin, red yeast rice, sertraline, and sertraline.      Allergies  Review of patient's allergies indicates:   Allergen Reactions    Flu vaccine  (36 mos+)(pf) Anaphylaxis    Iodine and iodide containing products     Lipitor [atorvastatin]     Niacin preparations     Statins-hmg-coa reductase inhibitors Hives    Welchol [colesevelam] Other (See Comments)     GI upset and insomnia    Zetia [ezetimibe] Nausea And Vomiting       All medications, allergies, and past history have been reviewed.    Objective:      Vitals:  Vitals - 1 value per visit 2022   SYSTOLIC 122 - -   DIASTOLIC 68 - -   Pulse 62 - -   Temp 98.1 - 98.2   Resp - - -   SPO2 98 - -   Weight (lb) 183.86 - 183.86   Weight (kg) 83.4 - 83.4   Height 62 - 62   BMI (Calculated) 33.6 - 33.6   VISIT REPORT - - -   Pain Score  - 5 -   Some recent data might be hidden       Body surface area is 1.91 meters squared.    Physical Exam:    GENERAL  APPEARANCE -  alert, appears stated age, cooperative and  mildly obese  BARRIER(S) TO COMMUNICATION -  none VOICE - appropriate for age and gender    INTEGUMENTARY  no suspicious head and neck lesions    HEENT  HEAD: Normocephalic, without obvious abnormality, atraumatic  FACE: INSPECTION - Symmetric, no signs of trauma, no suspicious lesion(s)  PALPATION -  No masses TMJ without pop, tenderness or crepitance bilaterally, no asymmetry of opening. SALIVARY GLANDS - non-tender with no appreciable mass  STRENGTH - facial symmetry  NECK/THYROID: normal atraumatic, no neck masses, normal thyroid, no jvd    EYES  Normal occular alignment and mobility with no visible nystagmus at rest    EARS/NOSE/MOUTH/THROAT  EARS  PINNAE AND EXTERNAL EARS - no suspicious lesion OTOSCOPIC EXAM (surgical microscopy was used for visualization/instrumentation): EAR EXAM - Normal ear canals, tympanic membranes and mobility, and middle ear spaces bilaterally. No real wax.  HEARING - grossly intact to voice/finger rub      CHEST AND LUNG   INSPECTION & AUSCULTATION - normal effort, no stridor    CARDIOVASCULAR  AUSCULTATION & PERIPHERAL VASCULAR - regular rate and rhythm.    NEUROLOGIC  MENTAL STATUS - alert, interactive CRANIAL NERVES - normal        Procedure(s):  None    Labs:  WBC   Date Value Ref Range Status   12/17/2021 7.04 3.90 - 12.70 K/uL Final     Hemoglobin   Date Value Ref Range Status   12/17/2021 15.2 12.0 - 16.0 g/dL Final     Platelets   Date Value Ref Range Status   12/17/2021 177 150 - 450 K/uL Final     Creatinine   Date Value Ref Range Status   03/15/2022 0.7 0.5 - 1.4 mg/dL Final     TSH   Date Value Ref Range Status   12/17/2021 1.557 0.400 - 4.000 uIU/mL Final     Glucose   Date Value Ref Range Status   03/15/2022 84 70 - 110 mg/dL Final     Hemoglobin A1C   Date Value Ref Range Status   07/09/2019 5.0 4.0 - 5.6 % Final     Comment:     ADA Screening Guidelines:  5.7-6.4%  Consistent with prediabetes  >or=6.5%  Consistent with diabetes  High levels of fetal hemoglobin  interfere with the HbA1C  assay. Heterozygous hemoglobin variants (HbS, HgC, etc)do  not significantly interfere with this assay.   However, presence of multiple variants may affect accuracy.           Assessment:      Problem List Items Addressed This Visit    None     Visit Diagnoses     Itching of ear    -  Primary    Right ear pain        Hearing loss, unspecified hearing loss type, unspecified laterality        TMJPDS (temporomandibular joint pain dysfunction syndrome)                   Plan:         Conservative care.  Dermotic oil then routine ear care.  Routine audiogram.  To discuss TMJ concerns with dentistry.

## 2022-05-19 ENCOUNTER — TELEPHONE (OUTPATIENT)
Dept: FAMILY MEDICINE | Facility: CLINIC | Age: 72
End: 2022-05-19
Payer: MEDICARE

## 2022-05-19 NOTE — TELEPHONE ENCOUNTER
----- Message from Marie Ro sent at 5/19/2022  9:32 AM CDT -----  Patient would like to know if you perform hip injections for pain in her hip? Please call her at [phone].  Thank you!

## 2022-06-02 ENCOUNTER — OFFICE VISIT (OUTPATIENT)
Dept: PHYSICAL MEDICINE AND REHAB | Facility: CLINIC | Age: 72
End: 2022-06-02
Payer: MEDICARE

## 2022-06-02 ENCOUNTER — HOSPITAL ENCOUNTER (OUTPATIENT)
Dept: RADIOLOGY | Facility: HOSPITAL | Age: 72
Discharge: HOME | End: 2022-06-02
Attending: PHYSICAL MEDICINE & REHABILITATION
Payer: MEDICARE

## 2022-06-02 VITALS
WEIGHT: 183 LBS | DIASTOLIC BLOOD PRESSURE: 77 MMHG | HEIGHT: 62 IN | HEART RATE: 68 BPM | SYSTOLIC BLOOD PRESSURE: 148 MMHG | BODY MASS INDEX: 33.68 KG/M2

## 2022-06-02 DIAGNOSIS — R29.898 RIGHT LEG WEAKNESS: ICD-10-CM

## 2022-06-02 PROCEDURE — 73502 XR PELVIS 3 VIEW INC HIP 2 VIEW RIGHT: ICD-10-PCS | Mod: 26,RT,, | Performed by: RADIOLOGY

## 2022-06-02 PROCEDURE — 72110 X-RAY EXAM L-2 SPINE 4/>VWS: CPT | Mod: TC,FY

## 2022-06-02 PROCEDURE — 99999 PR PBB SHADOW E&M-EST. PATIENT-LVL IV: CPT | Mod: PBBFAC,,, | Performed by: PHYSICAL MEDICINE & REHABILITATION

## 2022-06-02 PROCEDURE — 99204 OFFICE O/P NEW MOD 45 MIN: CPT | Mod: S$GLB,,, | Performed by: PHYSICAL MEDICINE & REHABILITATION

## 2022-06-02 PROCEDURE — 72110 XR LUMBAR SPINE COMPLETE 5 VIEW: ICD-10-PCS | Mod: 26,,, | Performed by: RADIOLOGY

## 2022-06-02 PROCEDURE — 99204 PR OFFICE/OUTPT VISIT, NEW, LEVL IV, 45-59 MIN: ICD-10-PCS | Mod: S$GLB,,, | Performed by: PHYSICAL MEDICINE & REHABILITATION

## 2022-06-02 PROCEDURE — 99999 PR PBB SHADOW E&M-EST. PATIENT-LVL IV: ICD-10-PCS | Mod: PBBFAC,,, | Performed by: PHYSICAL MEDICINE & REHABILITATION

## 2022-06-02 PROCEDURE — 72110 X-RAY EXAM L-2 SPINE 4/>VWS: CPT | Mod: 26,,, | Performed by: RADIOLOGY

## 2022-06-02 PROCEDURE — 73502 X-RAY EXAM HIP UNI 2-3 VIEWS: CPT | Mod: TC,FY,RT

## 2022-06-02 PROCEDURE — 73502 X-RAY EXAM HIP UNI 2-3 VIEWS: CPT | Mod: 26,RT,, | Performed by: RADIOLOGY

## 2022-06-02 NOTE — PROGRESS NOTES
HPI:  Patient is a 71 y.o. year old female  And right hip pain. She has been hurting for several years. It is difficult to go from sitting to standing. It is difficult to walk. It is accompanied by weakness. She has a hard time picking up her leg when she goes up steps. She has received numerous trochanteric bursa injections which used to give her short term relief, but they don't last long any more. She has also gone to extensive physical therapy with no improvement. She denies frequent falls or any recent trauma. However, she does admit to a remote coccygeal fracture as a child and a more recent tibial fracture on her right leg.  She denies any neuro deficits.    Labs  EGFR cr lfts gluc nl  hgba1c 5    Imaging  MRI Knee Without Contrast Right  Order: [id number]   Status: Final result     Visible to patient: Yes (not seen)     Next appt: 07/25/2022 at 08:20 AM in Lab ([facility])     Dx: Acute medial meniscus tear of right k...     0 Result Notes    Details    Reading Physician Reading Date Result Priority   Franky Rodriguez MD  [phone] 11/2/2020      Narrative & Impression  CLINICAL HISTORY:  69 years (1950) Female MMT PAIN AND SWELLING BEHIND R KNEE X 6  MONTHS/ NO TRAUMA NO SX/ HX CERVICAL CA 1990 W HYSTERECTOMY     TECHNIQUE:  MRI KNEE WITHOUT CONTRAST RIGHT. 155 images obtained.     COMPARISON:  None available.     FINDINGS:  Fluid: Normal.     Medial compartment:  - Medial meniscus: Normal morphology and signal.  - Medial collateral ligament: Normal.  - Cartilage: Mild cartilage thinning along the articular weightbearing  surface of the medial femoral condyle. There is a small likely  synovial cyst along the posterior intercondylar portion of the medial  compartment (axial image 20, coronal image 21) measuring approximately  5 mm in diameter.     Lateral compartment:  - Lateral meniscus: Normal morphology and signal.  - Lateral collateral ligament complex: Normal.  - Cartilage: Mild  cartilage thinning in the lateral compartment. There  is a focal rounded full-thickness defect along the mid articular  weightbearing surface of the lateral femoral condyle measuring  approximately 4 mm in diameter. There is adjacent moderate thickness  cartilage fissuring along the tibial plateau (coronal image 19).     Posterolateral corner:  - Popliteus tendon: Normal.  - Proximal tibiofibular joint: Normal.     Anterior compartment:  - Alignment: Normal.  - Quadriceps tendon: Normal.  - Patellar tendon: Normal.  - Retinaculum: Normal.  - Patellar cartilage: Full-thickness cartilage loss is seen along the  lateral articular facet, with moderate bone marrow edema along the  superior lateral patella. There may be a small osteophyte and adjacent  subchondral cystic geode along the superior margin of the patellar  ridge (axial image 10-11).  - Trochlea: Mild cartilage thinning is seen.  - Hoffa fat pad: Normal.     Popliteal cyst: None.     Intercondylar compartment:  - Anterior cruciate ligament: Normal thickness and signal.  - Posterior cruciate ligament: Normal thickness and signal.     IMPRESSION:  1. No discrete meniscal tear or ligamentous injury identified.  2. Full-thickness cartilage loss along the patella.  3. Focal full-thickness cartilage loss along the mid lateral femoral  condyle.     Electronically Signed by EMELINA Serrano    DXA Bone Density Spine And Hip  Order: [id number]   Status: Final result     Visible to patient: Yes (not seen)     Next appt: 07/25/2022 at 08:20 AM in Lab ([facility])     Dx: Postmenopausal     2 Result Notes     1  Topic    Details    Reading Physician Reading Date Result Priority   Mikael Nolen MD  [phone] 5/22/2020 Routine     Narrative & Impression  EXAMINATION:  DEXA BONE DENSITY SPINE HIP     CLINICAL HISTORY:  Asymptomatic menopausal state     TECHNIQUE:  DXA scanning was performed over the left hip and lumbar spine.  Review of the images confirms  satisfactory positioning and technique.     COMPARISON:  02/01/2017     FINDINGS:  Please note, a dissimilar scanner type was used on the prior study which may confound accurate comparison.     The L1 to L4 vertebral bone mineral density is equal to 1.008 g/cm squared with a T score of -0.4.     The left femoral neck bone mineral density is equal to 0.716 g/cm squared with a T score of -1.2.     There is a 14% risk of a major osteoporotic fracture and a 3.8% risk of hip fracture in the next 10 years (FRAX).     Impression:     Osteopenia--There is a 14% risk of a major osteoporotic fracture and a 3.8% risk of hip fracture in the next 10 years (FRAX).              Past Medical History:   Diagnosis Date    Abnormal Pap smear 1990    Allergy to statin medication     Anxiety     Arthritis     Asthma     Back pain     Cervical cancer     Depression     GERD (gastroesophageal reflux disease)     H/O head and neck radiation 1/11/2017    Hyperlipidemia     Kidney insufficiency     Multinodular goiter 1/11/2017    Obesity     Smoking 1/11/2017    Uterine cancer      Past Surgical History:   Procedure Laterality Date    ADENOIDECTOMY      ADRENALECTOMY      CARPAL TUNNEL RELEASE      CHOLECYSTECTOMY      COLONOSCOPY N/A 12/21/2017    Procedure: COLONOSCOPY;  Surgeon: Harriett Pereyra MD;  Location: [facility];  Service: Endoscopy;  Laterality: N/A;    ESOPHAGOGASTRODUODENOSCOPY N/A 7/18/2018    Procedure: EGD (ESOPHAGOGASTRODUODENOSCOPY);  Surgeon: Harriett Pereyra MD;  Location: [facility];  Service: Endoscopy;  Laterality: N/A;    EYE SURGERY      HYSTERECTOMY      OOPHORECTOMY      TONSILLECTOMY       Family History   Problem Relation Age of Onset    Heart disease Mother         MI    Heart disease Father         MI    Hyperlipidemia Brother     ANTHONY disease Daughter     Diabetes Son     ANTHONY disease Maternal Grandmother     Bladder Cancer Paternal Grandmother     Heart disease Paternal  Grandfather         MI    Breast cancer Neg Hx     Colon cancer Neg Hx     Ovarian cancer Neg Hx     Cancer Neg Hx      Social History     Socioeconomic History    Marital status:     Number of children: 2    Years of education: 13    Highest education level: Some college, no degree   Occupational History    Occupation: [employer]     Employer: [employer]   Tobacco Use    Smoking status: Former Smoker     Packs/day: 0.25     Years: 20.00     Pack years: 5.00     Types: Cigarettes     Quit date: 2017     Years since quittin.4    Smokeless tobacco: Never Used   Substance and Sexual Activity    Alcohol use: No    Drug use: No    Sexual activity: Yes     Partners: Male     Social Determinants of Health     Financial Resource Strain: Low Risk     Difficulty of Paying Living Expenses: Not hard at all   Food Insecurity: No Food Insecurity    Worried About Running Out of Food in the Last Year: Never true    Ran Out of Food in the Last Year: Never true   Transportation Needs: No Transportation Needs    Lack of Transportation (Medical): No    Lack of Transportation (Non-Medical): No   Physical Activity: Inactive    Days of Exercise per Week: 0 days    Minutes of Exercise per Session: 0 min   Stress: No Stress Concern Present    Feeling of Stress : Not at all   Social Connections: Socially Isolated    Frequency of Communication with Friends and Family: More than three times a week    Frequency of Social Gatherings with Friends and Family: More than three times a week    Attends Protestant Services: Never    Active Member of Clubs or Organizations: No    Attends Club or Organization Meetings: Never    Marital Status:    Housing Stability: Low Risk     Unable to Pay for Housing in the Last Year: No    Number of Places Lived in the Last Year: 1    Unstable Housing in the Last Year: No       Review of patient's allergies indicates:   Allergen Reactions    Flu vaccine  (36  mos+)(pf) Anaphylaxis    Iodine and iodide containing products     Lipitor [atorvastatin]     Niacin preparations     Statins-hmg-coa reductase inhibitors Hives    Welchol [colesevelam] Other (See Comments)     GI upset and insomnia    Zetia [ezetimibe] Nausea And Vomiting       Current Outpatient Medications:     clindamycin phosphate 1% (CLINDAGEL) 1 % gel, Apply topically 2 (two) times daily., Disp: 60 g, Rfl: 3    co-enzyme Q-10 30 mg capsule, Take 30 mg by mouth once daily., Disp: , Rfl:     diclofenac sodium (VOLTAREN) 1 % Gel, Apply 2 g topically once daily., Disp: 100 g, Rfl: 3    evolocumab (REPATHA PUSHTRONEX) 420 mg/3.5 mL Injt, Inject 3.5 mLs (420 mg total) into the skin every 30 days., Disp: 3.5 mL, Rfl: 5    fluocinolone acetonide oiL 0.01 % Drop, 3-4 drops to the affected ear(s) twice daily no more than a week at a time as needed for itching and scaling, Disp: 20 mL, Rfl: 0    fluticasone (FLONASE) 50 mcg/actuation nasal spray, 2 sprays (100 mcg total) by Each Nare route once daily., Disp: 16 g, Rfl: 11    gabapentin (NEURONTIN) 300 MG capsule, 1 p.o. in the morning 1 p.o. in the afternoon and 2 p.o. at night (Patient taking differently: daily as needed.), Disp: 120 capsule, Rfl: 11    GARLIC (GARLIQUE ORAL), Take 1 capsule by mouth once daily., Disp: , Rfl:     magnesium oxide (MAG-OX) 400 mg (241.3 mg magnesium) tablet, Take 400 mg by mouth once daily., Disp: , Rfl:     MULTIVITAMIN (MULTIPLE VITAMINS ORAL), Take by mouth., Disp: , Rfl:     red yeast rice 600 mg Cap, Take 2 capsules by mouth every evening., Disp: , Rfl:     sertraline (ZOLOFT) 100 MG tablet, Take 1.5 tablets by mouth once daily. 125mg daily, Disp: , Rfl:     sertraline (ZOLOFT) 25 MG tablet, Take 25 mg by mouth once daily. Takes 125 mg daily, Disp: , Rfl:     albuterol (VENTOLIN HFA) 90 mcg/actuation inhaler, Inhale 2 puffs into the lungs every 6 (six) hours as needed for Wheezing. Rescue, Disp: 18 g, Rfl: 0     amitriptyline (ELAVIL) 10 MG tablet, Take 1 tablet (10 mg total) by mouth nightly as needed for Insomnia., Disp: 30 tablet, Rfl: 1    nitroGLYCERIN (NITROSTAT) 0.4 MG SL tablet, Place 1 tablet (0.4 mg total) under the tongue every 5 (five) minutes as needed for Chest pain., Disp: 20 tablet, Rfl: 1      Review of Systems:    No nausea, vomiting, fevers, chills , contipation, diarrhea or sweats,no weight change, occ back stiffness, no chest pain, no sob, no change of bowel or bladder habits,no coordination issues      Physical Exam:      Vitals:    06/02/22 0919   BP: (!) 148/77   Pulse: 68     alert and oriented ×4 follows commands answers all questions appropriately,affect wnl  Manual muscle test 5 out of 5 sensation to light touch grossly intact  + excruciate tenderness Rgreater trochanters, mild tenderness right sijt  antalgic gait  -slR b/l  -CAROLA  ful hip ROM b/l  babinsky down  No clonus  DTR's symmetric 2+  No C/C/E   + unleveled iliac crests and shoulders  +standing flexion test  Poor psis mobility  NO leg length discrepancy resolved          Assessment:  Right gluteus medius tendinopathy  Scoliosis  sijt dysfunction  Subjective right leg weakness    Plan:  Discussed starting with percutaneous tenotomy of right gluteus medius.  Discussed percutaneous tenotomy, answered all questions, reviewed risks and benefits- pamphlet issued. If interested, will examine her right hip under ultrasound next eval. To see if she is a good candidate.  Patient is describing leg weakness and her leg giving out. I will schedule an EMG/NCS of her right leg to rule out any nerve damage prior to scheduling tenotomy.  I have also ordered xrays of pelvis and lumbar spine.        Thank you for this interesting referral-note will be sent via Epic to referring provider (Dr. Moses)

## 2022-06-08 ENCOUNTER — TELEPHONE (OUTPATIENT)
Dept: PHYSICAL MEDICINE AND REHAB | Facility: CLINIC | Age: 72
End: 2022-06-08
Payer: MEDICARE

## 2022-06-08 ENCOUNTER — OFFICE VISIT (OUTPATIENT)
Dept: PHYSICAL MEDICINE AND REHAB | Facility: CLINIC | Age: 72
End: 2022-06-08
Payer: MEDICARE

## 2022-06-08 VITALS
HEIGHT: 62 IN | BODY MASS INDEX: 33.68 KG/M2 | HEART RATE: 68 BPM | SYSTOLIC BLOOD PRESSURE: 131 MMHG | WEIGHT: 183 LBS | DIASTOLIC BLOOD PRESSURE: 78 MMHG

## 2022-06-08 DIAGNOSIS — R29.898 RIGHT LEG WEAKNESS: ICD-10-CM

## 2022-06-08 DIAGNOSIS — M25.559 HIP PAIN: ICD-10-CM

## 2022-06-08 PROCEDURE — 99999 PR PBB SHADOW E&M-EST. PATIENT-LVL IV: ICD-10-PCS | Mod: PBBFAC,,, | Performed by: PHYSICAL MEDICINE & REHABILITATION

## 2022-06-08 PROCEDURE — 76882 US LMTD JT/FCL EVL NVASC XTR: CPT | Mod: S$GLB,,, | Performed by: PHYSICAL MEDICINE & REHABILITATION

## 2022-06-08 PROCEDURE — 99499 UNLISTED E&M SERVICE: CPT | Mod: S$GLB,,, | Performed by: PHYSICAL MEDICINE & REHABILITATION

## 2022-06-08 PROCEDURE — 99213 OFFICE O/P EST LOW 20 MIN: CPT | Mod: 25,S$GLB,, | Performed by: PHYSICAL MEDICINE & REHABILITATION

## 2022-06-08 PROCEDURE — 76882 PR  US,EXTREMITY,NONVASCULAR,REAL-TIME IMAGE,LIMITED: ICD-10-PCS | Mod: S$GLB,,, | Performed by: PHYSICAL MEDICINE & REHABILITATION

## 2022-06-08 PROCEDURE — 99999 PR PBB SHADOW E&M-EST. PATIENT-LVL IV: CPT | Mod: PBBFAC,,, | Performed by: PHYSICAL MEDICINE & REHABILITATION

## 2022-06-08 PROCEDURE — 95908 PR NERVE CONDUCTION STUDY; 3-4 STUDIES: ICD-10-PCS | Mod: S$GLB,,, | Performed by: PHYSICAL MEDICINE & REHABILITATION

## 2022-06-08 PROCEDURE — 99499 NO LOS: ICD-10-PCS | Mod: S$GLB,,, | Performed by: PHYSICAL MEDICINE & REHABILITATION

## 2022-06-08 PROCEDURE — 95908 NRV CNDJ TST 3-4 STUDIES: CPT | Mod: S$GLB,,, | Performed by: PHYSICAL MEDICINE & REHABILITATION

## 2022-06-08 PROCEDURE — 99213 PR OFFICE/OUTPT VISIT, EST, LEVL III, 20-29 MIN: ICD-10-PCS | Mod: 25,S$GLB,, | Performed by: PHYSICAL MEDICINE & REHABILITATION

## 2022-06-08 PROCEDURE — 95886 PR EMG COMPLETE, W/ NERVE CONDUCTION STUDIES, 5+ MUSCLES: ICD-10-PCS | Mod: S$GLB,,, | Performed by: PHYSICAL MEDICINE & REHABILITATION

## 2022-06-08 PROCEDURE — 95886 MUSC TEST DONE W/N TEST COMP: CPT | Mod: S$GLB,,, | Performed by: PHYSICAL MEDICINE & REHABILITATION

## 2022-06-08 RX ORDER — ALPRAZOLAM 0.5 MG/1
0.5 TABLET, ORALLY DISINTEGRATING ORAL ONCE AS NEEDED
Status: CANCELLED | OUTPATIENT
Start: 2022-06-08 | End: 2033-11-04

## 2022-06-08 RX ORDER — LIDOCAINE HYDROCHLORIDE 10 MG/ML
1 INJECTION, SOLUTION EPIDURAL; INFILTRATION; INTRACAUDAL; PERINEURAL ONCE
Status: CANCELLED | OUTPATIENT
Start: 2022-06-08 | End: 2022-06-08

## 2022-06-08 NOTE — PROGRESS NOTES
HPI:  Patient is a 71 y.o. year old female w. Right hip pain and subjective leg weakness. Today's EMG/NCS was essentially normal.  She is complaining of difficulty going from sitting to standing. It is difficult to walk. Laying on it is very painful. She denies any weakness, frequent falls or any recent trauma. She denies any neuro deficits.  Last eval. , I ordered imaging, results are below:    Imaging  XR LUMBAR SPINE COMPLETE 5 VIEW     CLINICAL HISTORY:  Other symptoms and signs involving the musculoskeletal system     TECHNIQUE:  AP, lateral, spot and bilateral oblique views of the lumbar spine were performed.     COMPARISON:  None     FINDINGS:  The alignment is within normal limits.  The vertebral bodies are intact without evidence of fracture or compression.  No spondylolysis or spondylolisthesis is seen.  There is mild disc space narrowing identified at L2-3, L3-4, L4-5 and L5-S1 which may be due to degenerative disc disease.  Small spondylitic spurs are noted of, and L5.     Impression:     Possible mild degenerative disc disease at L L2-3, L3-4, L4-5 and L5-S1.   XR PELVIS 3 VIEW INC HIP 2 VIEW RIGHT     CLINICAL HISTORY:  Other symptoms and signs involving the musculoskeletal system     TECHNIQUE:  AP     COMPARISON:  None     FINDINGS:  Surgical clips are seen along the course of the iliac vasculature.  No abnormal calcifications are noted.  The osseous structures are unremarkable.     Impression:     Surgical clips are seen along the course of the distal aorta and iliac vasculature.  There are mild degenerative changes of both hips    Labs  egfr cr lfts gluc nl    Past Medical History:   Diagnosis Date    Abnormal Pap smear 1990    Allergy to statin medication     Anxiety     Arthritis     Asthma     Back pain     Cervical cancer     Depression     GERD (gastroesophageal reflux disease)     H/O head and neck radiation 1/11/2017    Hyperlipidemia     Kidney insufficiency     Multinodular  goiter 2017    Obesity     Smoking 2017    Uterine cancer      Past Surgical History:   Procedure Laterality Date    ADENOIDECTOMY      ADRENALECTOMY      CARPAL TUNNEL RELEASE      CHOLECYSTECTOMY      COLONOSCOPY N/A 2017    Procedure: COLONOSCOPY;  Surgeon: Harriett Pereyra MD;  Location: [facility];  Service: Endoscopy;  Laterality: N/A;    ESOPHAGOGASTRODUODENOSCOPY N/A 2018    Procedure: EGD (ESOPHAGOGASTRODUODENOSCOPY);  Surgeon: Harriett Pereyra MD;  Location: [facility] END[facility];  Service: Endoscopy;  Laterality: N/A;    EYE SURGERY      HYSTERECTOMY      OOPHORECTOMY      TONSILLECTOMY       Family History   Problem Relation Age of Onset    Heart disease Mother         MI    Heart disease Father         MI    Hyperlipidemia Brother     ANTHONY disease Daughter     Diabetes Son     ANTHONY disease Maternal Grandmother     Bladder Cancer Paternal Grandmother     Heart disease Paternal Grandfather         MI    Breast cancer Neg Hx     Colon cancer Neg Hx     Ovarian cancer Neg Hx     Cancer Neg Hx      Social History     Socioeconomic History    Marital status:     Number of children: 2    Years of education: 13    Highest education level: Some college, no degree   Occupational History    Occupation: [employer]     Employer: [employer]   Tobacco Use    Smoking status: Former Smoker     Packs/day: 0.25     Years: 20.00     Pack years: 5.00     Types: Cigarettes     Quit date: 2017     Years since quittin.4    Smokeless tobacco: Never Used   Substance and Sexual Activity    Alcohol use: No    Drug use: No    Sexual activity: Yes     Partners: Male     Social Determinants of Health     Financial Resource Strain: Low Risk     Difficulty of Paying Living Expenses: Not hard at all   Food Insecurity: No Food Insecurity    Worried About Running Out of Food in the Last Year: Never true    Ran Out of Food in the Last Year: Never true   Transportation Needs: No  Transportation Needs    Lack of Transportation (Medical): No    Lack of Transportation (Non-Medical): No   Physical Activity: Inactive    Days of Exercise per Week: 0 days    Minutes of Exercise per Session: 0 min   Stress: No Stress Concern Present    Feeling of Stress : Not at all   Social Connections: Socially Isolated    Frequency of Communication with Friends and Family: More than three times a week    Frequency of Social Gatherings with Friends and Family: More than three times a week    Attends Rastafarian Services: Never    Active Member of Clubs or Organizations: No    Attends Club or Organization Meetings: Never    Marital Status:    Housing Stability: Low Risk     Unable to Pay for Housing in the Last Year: No    Number of Places Lived in the Last Year: 1    Unstable Housing in the Last Year: No       Review of patient's allergies indicates:   Allergen Reactions    Flu vaccine 2011 (36 mos+)(pf) Anaphylaxis    Iodine and iodide containing products     Lipitor [atorvastatin]     Niacin preparations     Statins-hmg-coa reductase inhibitors Hives    Welchol [colesevelam] Other (See Comments)     GI upset and insomnia    Zetia [ezetimibe] Nausea And Vomiting       Current Outpatient Medications:     clindamycin phosphate 1% (CLINDAGEL) 1 % gel, Apply topically 2 (two) times daily., Disp: 60 g, Rfl: 3    co-enzyme Q-10 30 mg capsule, Take 30 mg by mouth once daily., Disp: , Rfl:     diclofenac sodium (VOLTAREN) 1 % Gel, Apply 2 g topically once daily., Disp: 100 g, Rfl: 3    evolocumab (REPATHA PUSHTRONEX) 420 mg/3.5 mL Injt, Inject 3.5 mLs (420 mg total) into the skin every 30 days., Disp: 3.5 mL, Rfl: 5    fluocinolone acetonide oiL 0.01 % Drop, 3-4 drops to the affected ear(s) twice daily no more than a week at a time as needed for itching and scaling, Disp: 20 mL, Rfl: 0    fluticasone (FLONASE) 50 mcg/actuation nasal spray, 2 sprays (100 mcg total) by Each Nare route once  daily., Disp: 16 g, Rfl: 11    gabapentin (NEURONTIN) 300 MG capsule, 1 p.o. in the morning 1 p.o. in the afternoon and 2 p.o. at night (Patient taking differently: daily as needed.), Disp: 120 capsule, Rfl: 11    GARLIC (GARLIQUE ORAL), Take 1 capsule by mouth once daily., Disp: , Rfl:     magnesium oxide (MAG-OX) 400 mg (241.3 mg magnesium) tablet, Take 400 mg by mouth once daily., Disp: , Rfl:     MULTIVITAMIN (MULTIPLE VITAMINS ORAL), Take by mouth., Disp: , Rfl:     red yeast rice 600 mg Cap, Take 2 capsules by mouth every evening., Disp: , Rfl:     sertraline (ZOLOFT) 100 MG tablet, Take 1.5 tablets by mouth once daily. 125mg daily, Disp: , Rfl:     sertraline (ZOLOFT) 25 MG tablet, Take 25 mg by mouth once daily. Takes 125 mg daily, Disp: , Rfl:     albuterol (VENTOLIN HFA) 90 mcg/actuation inhaler, Inhale 2 puffs into the lungs every 6 (six) hours as needed for Wheezing. Rescue, Disp: 18 g, Rfl: 0    amitriptyline (ELAVIL) 10 MG tablet, Take 1 tablet (10 mg total) by mouth nightly as needed for Insomnia., Disp: 30 tablet, Rfl: 1    nitroGLYCERIN (NITROSTAT) 0.4 MG SL tablet, Place 1 tablet (0.4 mg total) under the tongue every 5 (five) minutes as needed for Chest pain., Disp: 20 tablet, Rfl: 1          Review of Systems  No nausea, vomiting, fevers, Chills , contipation, diarrhea or sweats    Physical Exam:      Vitals:    06/08/22 1054   BP: 131/78   Pulse: 68     alert and oriented ×4 follows commands answers all questions appropriately,affect wnl  Manual muscle test 5 out of 5 sensation to light touch grossly intact  + excruciate tenderness R greater trochanters, mild tenderness right sijt  antalgic gait  -slR b/l  No C/C/E   + unleveled iliac crests and shoulders  No muscular atrophy      Gluteus medius US FINDINGS  Real-time [facility] ultrasound of RIGHT LATERAL HIP  indicated a hypoechoic lesion in  the gluteus medius tendon which may represent diseased tissue. He had excruciate tenderness  to sono  palpation.There was bony irregularity noted at the point of attachment of the right gluteus medius tendon and the greater trochanter indicative of a chronic tendinopathy.There was no troch bursa effusion. The gluteus minimus and kasey attachments appeared intact.  Color Doppler  did not show neovascularization or hyperemia within the tendon tissue. Images have been saved       Assessment:  Right gluteus medius tendinopathy  Scoliosis  sijt dysfunction       Plan:  Discussed starting with percutaneous tenotomy of right gluteus medius.  Discussed percutaneous tenotomy, answered all questions, reviewed risks and benefits-she would like to schedule

## 2022-06-08 NOTE — TELEPHONE ENCOUNTER
----- Message from Juanis Lancaster LPN sent at 6/7/2022  5:01 PM CDT -----    ----- Message -----  From: Raisa Everett LPN  Sent: 6/7/2022   4:58 PM CDT  To: [facility] Staff      ----- Message -----  From: Harmony Winchester  Sent: 6/7/2022   4:53 PM CDT  To: , #    .Type:  Patient Call Back    Who Called: PT       Does the patient know what this is regarding?: SHE HAS QUESTIONS ABOUT THE EMG AT EMG AT 11 AM 6/8/2022    Would the patient rather a call back YES     Best Call Back Number: [phone]    Additional Information: Thank You

## 2022-06-08 NOTE — PROGRESS NOTES
[facility]  Physical Medicine and Rehabilitation   [address] [place] [address], [address]  [place] [address]             Patient: Syeda Gonsalves   Patient ID: [id number]   Sex:     Date of Birth:     Age:     Notes:     Last visit date: 6/8/2022         Visit date and time: 6/8/2022 10:52   Patient Age on Visit Date:     Referring Physician:     Diagnoses:         Right leg pain and weakness    Sensory NCS      Nerve / Sites Rec. Site Onset Lat Peak Lat Ref. NP Amp Ref. PP Amp Ref. Segments Distance Velocity     ms ms ms µV µV µV µV  cm m/s   R Sural - Ankle (Calf)      Calf Ankle 2.81 3.70 ?4.20 15.1 ?5.0 7.8 ?5.0 Calf - Ankle 14 50   R Superficial peroneal - Ankle      Lat leg Ankle 1.15 2.40 ?4.40 11.1 ?5.0 3.7 ?5.0 Lat leg - Ankle 14 122       Motor NCS      Nerve / Sites Muscle Latency Ref. Amplitude Ref. Amp % Duration Segments Distance Lat Diff Velocity Ref.     ms ms mV mV % ms  cm ms m/s m/s   R Peroneal - EDB      Ankle EDB 3.59 ?6.20 3.0 ?2.0 100 6.82 Ankle - EDB 8         Fib head EDB 9.74  2.8  94.6 7.24 Fib head - Ankle 28 6.15 46 ?39      Pop fossa EDB 10.99  2.6  88 6.61 Pop fossa - Fib head 10 1.25 80 ?39   R Tibial - AH      Ankle AH 5.89 ?6.00 3.6 ?3.0 100 4.22 Ankle - AH 8         Pop fossa AH 11.20  0.8  21.7 6.82 Pop fossa - Ankle 32 5.31 60 ?39       F  Wave      Nerve Fmin Ref.    ms ms   R Tibial - AH 49.69 ?58.00       EMG Summary Table     Spontaneous MUAP Recruitment   Muscle IA Fib PSW Fasc H.F. Amp Dur. PPP Pattern   R. Biceps femoris (short head) N None None None None N N N N   R. Iliopsoas N None None None None N N N N   R. Vastus lateralis N None None None None N N N N   R. Rectus femoris N None None None None N N N N   R. Gastrocnemius (Medial head) N None None None None N N N N   R. Tibialis anterior N None None None None N N N N       Summary    The motor conduction test was normal in all 2 of the tested nerves: R Peroneal - EDB, R Tibial - AH.    The sensory  conduction test was normal in all 2 of the tested nerves: R Sural - Ankle (Calf), R Superficial peroneal - Ankle.    The F wave study was normal in all 1 of the tested nerves: R Tibial - AH.    The needle EMG study was normal in all 6 tested muscles: R. Biceps femoris (short head), R. Iliopsoas, R. Vastus lateralis, R. Rectus femoris, R. Gastrocnemius (Medial head), R. Tibialis anterior.          Conclusion:       Essentially normal NCS/EMG.  Borderline superficial sensory nerve likely due to normal age variant        ____________________________  Doris Buenrostro D.O.

## 2022-07-11 ENCOUNTER — TELEPHONE (OUTPATIENT)
Dept: FAMILY MEDICINE | Facility: CLINIC | Age: 72
End: 2022-07-11
Payer: MEDICARE

## 2022-07-11 NOTE — TELEPHONE ENCOUNTER
Attempted to reach patient in regards to rescheduling her appointment on 08/01. Instructed patient to call the clinic at there earliest convenience.

## 2022-07-18 ENCOUNTER — TELEPHONE (OUTPATIENT)
Dept: PHYSICAL MEDICINE AND REHAB | Facility: CLINIC | Age: 72
End: 2022-07-18
Payer: MEDICARE

## 2022-07-18 NOTE — TELEPHONE ENCOUNTER
----- Message from Meghana Zelaya LPN sent at 7/15/2022 12:54 PM CDT -----    ----- Message -----  From: Harmony Winchester  Sent: 7/15/2022  12:45 PM CDT  To: [facility] Staff    .Type:  Patient Call Back    Who Called: PT       Does the patient know what this is regarding?: PT CALLED TO SEE IF SHE'LL NEED LAB WORK BEFORE HER PROCEDURE     Would the patient rather a call back YES     Best Call Back Number: [phone]    Additional Information: Thank You

## 2022-07-18 NOTE — TELEPHONE ENCOUNTER
Returned patient's call, informed patient she will not need lab work before the procedure.  Patient verbalized understanding.

## 2022-07-25 ENCOUNTER — LAB VISIT (OUTPATIENT)
Dept: LAB | Facility: HOSPITAL | Age: 72
End: 2022-07-25
Payer: MEDICARE

## 2022-07-25 DIAGNOSIS — E78.2 MIXED HYPERLIPIDEMIA: ICD-10-CM

## 2022-07-25 LAB
ALBUMIN SERPL BCP-MCNC: 3.6 G/DL (ref 3.5–5.2)
ALP SERPL-CCNC: 66 U/L (ref 55–135)
ALT SERPL W/O P-5'-P-CCNC: 11 U/L (ref 10–44)
ANION GAP SERPL CALC-SCNC: 6 MMOL/L (ref 8–16)
AST SERPL-CCNC: 19 U/L (ref 10–40)
BILIRUB SERPL-MCNC: 0.5 MG/DL (ref 0.1–1)
BUN SERPL-MCNC: 16 MG/DL (ref 8–23)
CALCIUM SERPL-MCNC: 9.3 MG/DL (ref 8.7–10.5)
CHLORIDE SERPL-SCNC: 103 MMOL/L (ref 95–110)
CHOLEST SERPL-MCNC: 208 MG/DL (ref 120–199)
CHOLEST/HDLC SERPL: 5.9 {RATIO} (ref 2–5)
CO2 SERPL-SCNC: 29 MMOL/L (ref 23–29)
CREAT SERPL-MCNC: 0.8 MG/DL (ref 0.5–1.4)
EST. GFR  (AFRICAN AMERICAN): >60 ML/MIN/1.73 M^2
EST. GFR  (NON AFRICAN AMERICAN): >60 ML/MIN/1.73 M^2
GLUCOSE SERPL-MCNC: 86 MG/DL (ref 70–110)
HDLC SERPL-MCNC: 35 MG/DL (ref 40–75)
HDLC SERPL: 16.8 % (ref 20–50)
LDLC SERPL CALC-MCNC: 134.4 MG/DL (ref 63–159)
NONHDLC SERPL-MCNC: 173 MG/DL
POTASSIUM SERPL-SCNC: 4.1 MMOL/L (ref 3.5–5.1)
PROT SERPL-MCNC: 7.1 G/DL (ref 6–8.4)
SODIUM SERPL-SCNC: 138 MMOL/L (ref 136–145)
TRIGL SERPL-MCNC: 193 MG/DL (ref 30–150)

## 2022-07-25 PROCEDURE — 80053 COMPREHEN METABOLIC PANEL: CPT | Performed by: FAMILY MEDICINE

## 2022-07-25 PROCEDURE — 80061 LIPID PANEL: CPT | Performed by: FAMILY MEDICINE

## 2022-07-25 PROCEDURE — 36415 COLL VENOUS BLD VENIPUNCTURE: CPT | Mod: PO | Performed by: FAMILY MEDICINE

## 2022-07-27 ENCOUNTER — HOSPITAL ENCOUNTER (OUTPATIENT)
Facility: HOSPITAL | Age: 72
Discharge: HOME | End: 2022-07-27
Attending: PHYSICAL MEDICINE & REHABILITATION | Admitting: PHYSICAL MEDICINE & REHABILITATION
Payer: MEDICARE

## 2022-07-27 VITALS
SYSTOLIC BLOOD PRESSURE: 108 MMHG | BODY MASS INDEX: 33.68 KG/M2 | OXYGEN SATURATION: 97 % | HEART RATE: 50 BPM | TEMPERATURE: 98 F | WEIGHT: 183 LBS | DIASTOLIC BLOOD PRESSURE: 55 MMHG | RESPIRATION RATE: 16 BRPM | HEIGHT: 62 IN

## 2022-07-27 DIAGNOSIS — M25.559 HIP PAIN: ICD-10-CM

## 2022-07-27 PROCEDURE — 27006 TENOTOMY ABDUCTOR&/XTNSR HIP: CPT | Mod: RT,,, | Performed by: PHYSICAL MEDICINE & REHABILITATION

## 2022-07-27 PROCEDURE — 71000015 HC POSTOP RECOV 1ST HR: Performed by: PHYSICAL MEDICINE & REHABILITATION

## 2022-07-27 PROCEDURE — 36000705 HC OR TIME LEV I EA ADD 15 MIN: Performed by: PHYSICAL MEDICINE & REHABILITATION

## 2022-07-27 PROCEDURE — 27006 PR INCIS HIP ADDUCTORS,OPEN: ICD-10-PCS | Mod: RT,,, | Performed by: PHYSICAL MEDICINE & REHABILITATION

## 2022-07-27 PROCEDURE — 36000704 HC OR TIME LEV I 1ST 15 MIN: Performed by: PHYSICAL MEDICINE & REHABILITATION

## 2022-07-27 PROCEDURE — 76942 PR U/S GUIDANCE FOR NEEDLE GUIDANCE: ICD-10-PCS | Mod: 26,,, | Performed by: PHYSICAL MEDICINE & REHABILITATION

## 2022-07-27 PROCEDURE — 76942 ECHO GUIDE FOR BIOPSY: CPT | Mod: 26,,, | Performed by: PHYSICAL MEDICINE & REHABILITATION

## 2022-07-27 PROCEDURE — 25000003 PHARM REV CODE 250: Performed by: PHYSICAL MEDICINE & REHABILITATION

## 2022-07-27 RX ORDER — ALPRAZOLAM 0.25 MG/1
0.5 TABLET, ORALLY DISINTEGRATING ORAL ONCE AS NEEDED
Status: COMPLETED | OUTPATIENT
Start: 2022-07-27 | End: 2022-07-27

## 2022-07-27 RX ORDER — BUPIVACAINE HYDROCHLORIDE 5 MG/ML
INJECTION, SOLUTION EPIDURAL; INTRACAUDAL
Status: DISCONTINUED | OUTPATIENT
Start: 2022-07-27 | End: 2022-07-27 | Stop reason: HOSPADM

## 2022-07-27 RX ORDER — LIDOCAINE HYDROCHLORIDE 10 MG/ML
1 INJECTION, SOLUTION EPIDURAL; INFILTRATION; INTRACAUDAL; PERINEURAL ONCE
Status: DISCONTINUED | OUTPATIENT
Start: 2022-07-27 | End: 2022-07-27 | Stop reason: HOSPADM

## 2022-07-27 RX ORDER — LIDOCAINE HYDROCHLORIDE 20 MG/ML
INJECTION, SOLUTION EPIDURAL; INFILTRATION; INTRACAUDAL; PERINEURAL
Status: DISCONTINUED | OUTPATIENT
Start: 2022-07-27 | End: 2022-07-27 | Stop reason: HOSPADM

## 2022-07-27 RX ADMIN — ALPRAZOLAM 0.5 MG: 0.25 TABLET, ORALLY DISINTEGRATING ORAL at 02:07

## 2022-07-27 NOTE — H&P
HPI:  Patient is a 71 y.o. year old female w. Right hip pain. She is here for a percutaneous tenotomy of right gluteus medius.    Imaging  XR LUMBAR SPINE COMPLETE 5 VIEW     CLINICAL HISTORY:  Other symptoms and signs involving the musculoskeletal system     TECHNIQUE:  AP, lateral, spot and bilateral oblique views of the lumbar spine were performed.     COMPARISON:  None     FINDINGS:  The alignment is within normal limits.  The vertebral bodies are intact without evidence of fracture or compression.  No spondylolysis or spondylolisthesis is seen.  There is mild disc space narrowing identified at L2-3, L3-4, L4-5 and L5-S1 which may be due to degenerative disc disease.  Small spondylitic spurs are noted of, and L5.     Impression:     Possible mild degenerative disc disease at L L2-3, L3-4, L4-5 and L5-S1.   XR PELVIS 3 VIEW INC HIP 2 VIEW RIGHT     CLINICAL HISTORY:  Other symptoms and signs involving the musculoskeletal system     TECHNIQUE:  AP     COMPARISON:  None     FINDINGS:  Surgical clips are seen along the course of the iliac vasculature.  No abnormal calcifications are noted.  The osseous structures are unremarkable.     Impression:     Surgical clips are seen along the course of the distal aorta and iliac vasculature.  There are mild degenerative changes of both hips    Labs  egfr cr lfts gluc nl    Past Medical History:   Diagnosis Date    Abnormal Pap smear 1990    Allergy to statin medication     Anxiety     Arthritis     Asthma     Back pain     Cervical cancer     Depression     GERD (gastroesophageal reflux disease)     H/O head and neck radiation 1/11/2017    Hyperlipidemia     Kidney insufficiency     Multinodular goiter 1/11/2017    Obesity     Smoking 1/11/2017    Uterine cancer      Past Surgical History:   Procedure Laterality Date    ADENOIDECTOMY      ADRENALECTOMY      CARPAL TUNNEL RELEASE      CHOLECYSTECTOMY      COLONOSCOPY N/A 12/21/2017    Procedure:  COLONOSCOPY;  Surgeon: Harriett Pereyra MD;  Location: [facility] ENDO;  Service: Endoscopy;  Laterality: N/A;    ESOPHAGOGASTRODUODENOSCOPY N/A 2018    Procedure: EGD (ESOPHAGOGASTRODUODENOSCOPY);  Surgeon: Harriett Pereyra MD;  Location: [facility] ENDO;  Service: Endoscopy;  Laterality: N/A;    EYE SURGERY      HYSTERECTOMY      OOPHORECTOMY      TONSILLECTOMY       Family History   Problem Relation Age of Onset    Heart disease Mother         MI    Heart disease Father         MI    Hyperlipidemia Brother     ANTHONY disease Daughter     Diabetes Son     ANTHONY disease Maternal Grandmother     Bladder Cancer Paternal Grandmother     Heart disease Paternal Grandfather         MI    Breast cancer Neg Hx     Colon cancer Neg Hx     Ovarian cancer Neg Hx     Cancer Neg Hx      Social History     Socioeconomic History    Marital status:     Number of children: 2    Years of education: 13    Highest education level: Some college, no degree   Occupational History    Occupation: [employer]     Employer: [employer]   Tobacco Use    Smoking status: Former Smoker     Packs/day: 0.25     Years: 20.00     Pack years: 5.00     Types: Cigarettes     Quit date: 2017     Years since quittin.6    Smokeless tobacco: Never Used   Substance and Sexual Activity    Alcohol use: No    Drug use: No    Sexual activity: Yes     Partners: Male     Social Determinants of Health     Financial Resource Strain: Low Risk     Difficulty of Paying Living Expenses: Not hard at all   Food Insecurity: No Food Insecurity    Worried About Running Out of Food in the Last Year: Never true    Ran Out of Food in the Last Year: Never true   Transportation Needs: No Transportation Needs    Lack of Transportation (Medical): No    Lack of Transportation (Non-Medical): No   Physical Activity: Inactive    Days of Exercise per Week: 0 days    Minutes of Exercise per Session: 0 min   Stress: No Stress Concern Present     Feeling of Stress : Not at all   Social Connections: Socially Isolated    Frequency of Communication with Friends and Family: More than three times a week    Frequency of Social Gatherings with Friends and Family: More than three times a week    Attends Tenriism Services: Never    Active Member of Clubs or Organizations: No    Attends Club or Organization Meetings: Never    Marital Status:    Housing Stability: Low Risk     Unable to Pay for Housing in the Last Year: No    Number of Places Lived in the Last Year: 1    Unstable Housing in the Last Year: No       Review of patient's allergies indicates:   Allergen Reactions    Flu vaccine 2011 (36 mos+)(pf) Anaphylaxis    Iodine and iodide containing products     Lipitor [atorvastatin]     Niacin preparations     Statins-hmg-coa reductase inhibitors Hives    Welchol [colesevelam] Other (See Comments)     GI upset and insomnia    Zetia [ezetimibe] Nausea And Vomiting       Current Facility-Administered Medications:     ALPRAZolam dissolvable tablet 0.5 mg, 0.5 mg, Oral, Once PRN, Doris Galan,     LIDOcaine (PF) 10 mg/ml (1%) injection 10 mg, 1 mL, Intradermal, Once, Doris Galan DO          Review of Systems  No nausea, vomiting, fevers, Chills , contipation, diarrhea or sweats    Physical Exam:      Vitals:    07/27/22 1337   BP: 129/63   Pulse: (!) 57   Resp: 20   Temp: 97.5 °F (36.4 °C)     alert and oriented ×4 follows commands answers all questions appropriately,affect wnl  Manual muscle test 5 out of 5 sensation to light touch grossly intact  + excruciate tenderness R greater trochanters, mild tenderness right sijt  antalgic gait  -slR b/l  No C/C/E   + unleveled iliac crests and shoulders  No muscular atrophy   Heart RRR  Lungs CTAB/L     Gluteus medius US FINDINGS  Real-time MSK ultrasound of RIGHT LATERAL HIP  indicated a hypoechoic lesion in  the gluteus medius tendon which may represent diseased tissue. He had  excruciate tenderness  to sono palpation.There was bony irregularity noted at the point of attachment of the right gluteus medius tendon and the greater trochanter indicative of a chronic tendinopathy.There was no troch bursa effusion. The gluteus minimus and kasey attachments appeared intact.  Color Doppler  did not show neovascularization or hyperemia within the tendon tissue. Images have been saved       Assessment:  Right gluteus medius tendinopathy         Plan:  Cleared to go to OR today for percutaneous tenotomy of right gluteus medius

## 2022-07-27 NOTE — DISCHARGE INSTRUCTIONS
We hope your stay was comfortable as you heal now, mend and rest.    For we have enjoyed taking care of you by giving your our best.    And as you get better, by regaining your health and strength;   We count it as a privilege to have served you and hope your time at [facility] was well spent.      Thank  You!!!         General Information:    1.  Do not drink alcoholic beverages including beer for 24 hours or as long as you are on pain medication..  2.  Do not drive a motor vehicle, operate machinery or power tools, or signs legal papers for 24 hours or as long as you are on pain medication.   3.  You may experience light-headedness, dizziness, and sleepiness following surgery. Please do not stay alone. A responsible adult should be with you for this 24 hour period.  4.  Go home and rest.    5. Progress slowly to a normal diet unless instructed.  Otherwise, begin with liquids such as soft drinks, then soup and crackers working up to solid foods. Drink plenty of nonalcoholic fluids.  6.  Certain anesthetics and pain medications produce nausea and vomiting in certain       individuals. If nausea becomes a problem at home, call you doctor.    7. A nurse will be calling you sometime after surgery. Do not be alarmed. This is our way of finding out how you are doing.    8. Several times every hour while you are awake, take 2-3 deep breaths and cough. If you had stomach surgery hold a pillow or rolled towel firmly against your stomach before you cough. This will help with any pain the cough might cause.  9. Several times every hour while you are awake, pump and flex your feet 5-6 times and do foot circles. This will help prevent blood clots.    10.Call your doctor for severe pain, bleeding, fever, or signs or symptoms of infection (pain, swelling, redness, foul odor, drainage).         Post Procedure Instructions    -For the first 24 hours after procedure apply an ice pack to the site for 20 minute periods and repeating can  help relieve pain at the site.    -If you have stopped taking Aspirin prior to the procedure, please restart taking it from the day after your procedure. You may restart your Plavix or Coumadin the day after your procedure unless otherwise instructed.     Keep clear dressing on for 48 hrs, then remove. Leave paper strips on until they fall off.    -Avoid submerging area in water for 5 days so no bathtub, hot tub, swimming pool     May wet in shower after 48 hours    -If numbing medication was administered , be careful getting around .      Elbow and Shoulder     -Rest arm or shoulder the day of procedure    -Wear sling for 48 hours after procedure     -Light activity for 3  weeks then progress as tolerated      Hip and Foot     -Rest hip or foot the day of procedure    -Use crutches for 48 hours after procedure    -Light activity for 3 weeks then progress as tolerated    Please seek medical help immediately if any of the following symptoms occur:    Severe increase in your usual pain or appearance of new pain.  Prolonged or increasing weakness or numbness in the legs or arms-longer than 6 hours.  Drainage, redness, active bleeding , or increasing swelling at the injection site  Temperature over 100.0 degrees F    Please contact [place] at [phone] in the event that your physical condition changes prior to your procedure, if you have to delay or to cancel your procedure or if you have any questions about your procedure or these instructions.

## 2022-07-27 NOTE — PATIENT INSTRUCTIONS
Before leaving, please make sure you have all your personal belongings such as glasses, purses, wallets, keys, cell phones, jewelry, jackets etc     Post Procedure Instructions  -For the first 24 hours after procedure apply an ice pack to the site for 20 minute periods and repeating can help relieve pain at the site.  -If you have stopped taking Aspirin prior to the procedure, please restart taking it from the day after your procedure. You may restart your plavix or Coumadin the day after your procedure unless otherwise instructed   -Avoid submerging area in water for 5 days- so no bathtub, hot tub, swimming pool  May wet in shower after 48 hrs  -If numbing medication was administered, be careful getting around.  -Partial weight bearing for 48hrs

## 2022-07-27 NOTE — OP NOTE
[facility]  Operative Note      Date of Procedure: 7/27/2022     Procedure: Procedure(s) (LRB):  TENOTOMY, PERCUTANEOUS (Right)     Surgeon(s) and Role:     * Doris Galan DO - Primary    Assisting Surgeon: None    Pre-Operative Diagnosis: Hip pain [M25.559]    Post-Operative Diagnosis: Post-Op Diagnosis Codes:     * Hip pain [M25.559]    Anesthesia: Local    Operative Findings (including complications, if any):  Doris Galan DO  7/27/2022  Procedure(s) (LRB):  TENOTOMY, PERCUTANEOUS (Right)  Hip pain [M25.559]      Patient was brought to the operating room, a time-out was done confirming the name, the location and the procedure. The patient was  placed in the lateral decubitus position with the right hip up. The skin overlying the right  gluteus medius was prepped using a ChloraPrep solution. Using ultrasound, the gluteus medius tendon was observed to be hypoechoic and heterogenous with increased neovascularity, consistent with a diagnosis of gluteus medius tendinopathy. After diffuse administration of 10 ml lidocaine 2% mixed with 5ml marcaine 0.5%, a #11 blade was used to make a small incision.  The TX2 handpiece was then introduced down to the tendon defects. With 0 minutes and 58 seconds of energy time, the tendon defect was ablated. The Tenex hand piece was then removed. Steri strips were applied to incision and  a sterile compression dressing was applied . The patient was returned to the recovery area in good condition. She was instructed to partial weight bear with the use of crutches for 48hrs. She is to follow up In 2 weeks.     Estimated Blood Loss: Minimal      Description of Technical Procedures: percutaneous tenotomy    Significant Surgical Tasks Conducted by the Assistant(s), if Applicable: none    Estimated Blood Loss (EBL): * No values recorded between 7/27/2022  3:43 PM and 7/27/2022  4:05 PM *           Implants: * No implants in log *    Specimens:   Specimen (24h  ago, onward)            None                  Condition: Good    Disposition: PACU - hemodynamically stable.    Attestation: I was present and scrubbed for the entire procedure.    Discharge Note    OUTCOME: Patient tolerated treatment/procedure well without complication and is now ready for discharge.    DISPOSITION: Home or Self Care    FINAL DIAGNOSIS:  <principal problem not specified>    FOLLOWUP: In clinic    DISCHARGE INSTRUCTIONS:  No discharge procedures on file.

## 2022-08-03 ENCOUNTER — OFFICE VISIT (OUTPATIENT)
Dept: FAMILY MEDICINE | Facility: CLINIC | Age: 72
End: 2022-08-03
Payer: MEDICARE

## 2022-08-03 VITALS
DIASTOLIC BLOOD PRESSURE: 66 MMHG | RESPIRATION RATE: 14 BRPM | OXYGEN SATURATION: 97 % | HEIGHT: 62 IN | WEIGHT: 181.69 LBS | TEMPERATURE: 99 F | SYSTOLIC BLOOD PRESSURE: 120 MMHG | BODY MASS INDEX: 33.43 KG/M2 | HEART RATE: 66 BPM

## 2022-08-03 DIAGNOSIS — M85.80 OSTEOPENIA AFTER MENOPAUSE: Primary | ICD-10-CM

## 2022-08-03 DIAGNOSIS — Z78.0 OSTEOPENIA AFTER MENOPAUSE: Primary | ICD-10-CM

## 2022-08-03 DIAGNOSIS — E78.2 MIXED HYPERLIPIDEMIA: ICD-10-CM

## 2022-08-03 PROCEDURE — 99214 PR OFFICE/OUTPT VISIT, EST, LEVL IV, 30-39 MIN: ICD-10-PCS | Mod: S$GLB,,,

## 2022-08-03 PROCEDURE — 3078F PR MOST RECENT DIASTOLIC BLOOD PRESSURE < 80 MM HG: ICD-10-PCS | Mod: CPTII,S$GLB,,

## 2022-08-03 PROCEDURE — 1125F PR PAIN SEVERITY QUANTIFIED, PAIN PRESENT: ICD-10-PCS | Mod: CPTII,S$GLB,,

## 2022-08-03 PROCEDURE — 99999 PR PBB SHADOW E&M-EST. PATIENT-LVL V: CPT | Mod: PBBFAC,,,

## 2022-08-03 PROCEDURE — 3288F PR FALLS RISK ASSESSMENT DOCUMENTED: ICD-10-PCS | Mod: CPTII,S$GLB,,

## 2022-08-03 PROCEDURE — 1157F PR ADVANCE CARE PLAN OR EQUIV PRESENT IN MEDICAL RECORD: ICD-10-PCS | Mod: CPTII,S$GLB,,

## 2022-08-03 PROCEDURE — 1101F PT FALLS ASSESS-DOCD LE1/YR: CPT | Mod: CPTII,S$GLB,,

## 2022-08-03 PROCEDURE — 99999 PR PBB SHADOW E&M-EST. PATIENT-LVL V: ICD-10-PCS | Mod: PBBFAC,,,

## 2022-08-03 PROCEDURE — 1159F MED LIST DOCD IN RCRD: CPT | Mod: CPTII,S$GLB,,

## 2022-08-03 PROCEDURE — 3074F SYST BP LT 130 MM HG: CPT | Mod: CPTII,S$GLB,,

## 2022-08-03 PROCEDURE — 1159F PR MEDICATION LIST DOCUMENTED IN MEDICAL RECORD: ICD-10-PCS | Mod: CPTII,S$GLB,,

## 2022-08-03 PROCEDURE — 3078F DIAST BP <80 MM HG: CPT | Mod: CPTII,S$GLB,,

## 2022-08-03 PROCEDURE — 3008F BODY MASS INDEX DOCD: CPT | Mod: CPTII,S$GLB,,

## 2022-08-03 PROCEDURE — 1160F PR REVIEW ALL MEDS BY PRESCRIBER/CLIN PHARMACIST DOCUMENTED: ICD-10-PCS | Mod: CPTII,S$GLB,,

## 2022-08-03 PROCEDURE — 1101F PR PT FALLS ASSESS DOC 0-1 FALLS W/OUT INJ PAST YR: ICD-10-PCS | Mod: CPTII,S$GLB,,

## 2022-08-03 PROCEDURE — 3288F FALL RISK ASSESSMENT DOCD: CPT | Mod: CPTII,S$GLB,,

## 2022-08-03 PROCEDURE — 1157F ADVNC CARE PLAN IN RCRD: CPT | Mod: CPTII,S$GLB,,

## 2022-08-03 PROCEDURE — 3074F PR MOST RECENT SYSTOLIC BLOOD PRESSURE < 130 MM HG: ICD-10-PCS | Mod: CPTII,S$GLB,,

## 2022-08-03 PROCEDURE — 1125F AMNT PAIN NOTED PAIN PRSNT: CPT | Mod: CPTII,S$GLB,,

## 2022-08-03 PROCEDURE — 3008F PR BODY MASS INDEX (BMI) DOCUMENTED: ICD-10-PCS | Mod: CPTII,S$GLB,,

## 2022-08-03 PROCEDURE — 1160F RVW MEDS BY RX/DR IN RCRD: CPT | Mod: CPTII,S$GLB,,

## 2022-08-03 PROCEDURE — 99214 OFFICE O/P EST MOD 30 MIN: CPT | Mod: S$GLB,,,

## 2022-08-03 RX ORDER — ERGOCALCIFEROL 1.25 MG/1
50000 CAPSULE ORAL
Qty: 12 CAPSULE | Refills: 3 | Status: SHIPPED | OUTPATIENT
Start: 2022-08-03 | End: 2023-09-07 | Stop reason: SDUPTHER

## 2022-08-03 NOTE — PATIENT INSTRUCTIONS
Tariq Landa,     If you are due for any health screening(s) below please notify me so we can arrange them to be ordered and scheduled to maintain your health. Most healthy patients complete it. Don't lose out on improving your health.     Tests to Keep You Healthy    Mammogram: Met  Colon Cancer Screening: Met

## 2022-08-03 NOTE — PROGRESS NOTES
Subjective:       Patient ID: Syeda Gonsalves is a 71 y.o. female.    Chief Complaint: Follow-up and Hyperlipidemia    Syeda Gonsalves is a 72 yo F pt w/ Mhx listed below that presents to the clinic for routine follow up of chronic medical conditions. Needs to establish w/ a new PCP now that Dr. Moses has left the practice. She denies any acute health complaints at this time. Had recent labs done. Cholesterol improved compared to previous. Not able to tolerate cholesterol medication so stopped after a month of taking. Has been eating healthier. Previously took baby aspirin daily but had stomach issues requiring her to see GI so stopped. Recently had a tenotomy via Dr. Galan for gluteal tendinopathy. Experienced significant relief of her symptoms with this procedure. Has follow up w/ Dr. Galan later this month. Asks if she should be taking calcium supplement as she has been doing. Osteopenia on last DXA.       Past Medical History:   Diagnosis Date    Abnormal Pap smear 1990    Allergy to statin medication     Anxiety     Arthritis     Asthma     Back pain     Cervical cancer     Depression     GERD (gastroesophageal reflux disease)     H/O head and neck radiation 1/11/2017    Hyperlipidemia     Kidney insufficiency     Multinodular goiter 1/11/2017    Obesity     Smoking 1/11/2017    Uterine cancer        Review of patient's allergies indicates:   Allergen Reactions    Flu vaccine 2011 (36 mos+)(pf) Anaphylaxis    Iodine and iodide containing products     Lipitor [atorvastatin]     Niacin preparations     Statins-hmg-coa reductase inhibitors Hives    Welchol [colesevelam] Other (See Comments)     GI upset and insomnia    Zetia [ezetimibe] Nausea And Vomiting         Current Outpatient Medications:     albuterol (VENTOLIN HFA) 90 mcg/actuation inhaler, Inhale 2 puffs into the lungs every 6 (six) hours as needed for Wheezing. Rescue, Disp: 18 g, Rfl: 0    amitriptyline (ELAVIL) 10  MG tablet, Take 1 tablet (10 mg total) by mouth nightly as needed for Insomnia., Disp: 30 tablet, Rfl: 1    clindamycin phosphate 1% (CLINDAGEL) 1 % gel, Apply topically 2 (two) times daily., Disp: 60 g, Rfl: 3    co-enzyme Q-10 30 mg capsule, Take 30 mg by mouth once daily., Disp: , Rfl:     diclofenac sodium (VOLTAREN) 1 % Gel, Apply 2 g topically once daily., Disp: 100 g, Rfl: 3    evolocumab (REPATHA PUSHTRONEX) 420 mg/3.5 mL Injt, Inject 3.5 mLs (420 mg total) into the skin every 30 days., Disp: 3.5 mL, Rfl: 5    fluocinolone acetonide oiL 0.01 % Drop, 3-4 drops to the affected ear(s) twice daily no more than a week at a time as needed for itching and scaling, Disp: 20 mL, Rfl: 0    fluticasone (FLONASE) 50 mcg/actuation nasal spray, 2 sprays (100 mcg total) by Each Nare route once daily., Disp: 16 g, Rfl: 11    gabapentin (NEURONTIN) 300 MG capsule, 1 p.o. in the morning 1 p.o. in the afternoon and 2 p.o. at night (Patient taking differently: daily as needed.), Disp: 120 capsule, Rfl: 11    GARLIC (GARLIQUE ORAL), Take 1 capsule by mouth once daily., Disp: , Rfl:     magnesium oxide (MAG-OX) 400 mg (241.3 mg magnesium) tablet, Take 400 mg by mouth once daily., Disp: , Rfl:     MULTIVITAMIN (MULTIPLE VITAMINS ORAL), Take by mouth., Disp: , Rfl:     nitroGLYCERIN (NITROSTAT) 0.4 MG SL tablet, Place 1 tablet (0.4 mg total) under the tongue every 5 (five) minutes as needed for Chest pain., Disp: 20 tablet, Rfl: 1    red yeast rice 600 mg Cap, Take 2 capsules by mouth every evening., Disp: , Rfl:     sertraline (ZOLOFT) 100 MG tablet, Take 1.5 tablets by mouth once daily. 125mg daily, Disp: , Rfl:     sertraline (ZOLOFT) 25 MG tablet, Take 25 mg by mouth once daily. Takes 125 mg daily, Disp: , Rfl:     Review of Systems   Constitutional: Negative for activity change, appetite change, chills, diaphoresis, fatigue, fever and unexpected weight change.   HENT: Negative for congestion, ear pain, postnasal  drip, rhinorrhea, sinus pressure, sneezing, sore throat and trouble swallowing.    Eyes: Negative for pain, itching and visual disturbance.   Respiratory: Negative for cough, chest tightness, shortness of breath and wheezing.    Cardiovascular: Negative for chest pain, palpitations and leg swelling.   Gastrointestinal: Negative for abdominal distention, abdominal pain, blood in stool, constipation, diarrhea, nausea and vomiting.   Endocrine: Negative for cold intolerance and heat intolerance.   Genitourinary: Negative for difficulty urinating, dysuria, frequency, hematuria and urgency.   Musculoskeletal: Negative for arthralgias, back pain, myalgias and neck pain.   Skin: Negative for color change, pallor, rash and wound.   Neurological: Negative for dizziness, syncope, weakness, numbness and headaches.   Hematological: Negative for adenopathy.   Psychiatric/Behavioral: Negative for behavioral problems. The patient is not nervous/anxious.        Objective:      [facility] 09/25/1990   Physical Exam  Vitals reviewed.   Constitutional:       General: She is not in acute distress.     Appearance: Normal appearance. She is obese. She is not ill-appearing, toxic-appearing or diaphoretic.   HENT:      Head: Normocephalic.      Right Ear: External ear normal.      Left Ear: External ear normal.      Nose: Nose normal. No congestion or rhinorrhea.      Mouth/Throat:      Mouth: Mucous membranes are moist.      Pharynx: Oropharynx is clear.   Eyes:      General: No scleral icterus.        Right eye: No discharge.         Left eye: No discharge.      Extraocular Movements: Extraocular movements intact.      Conjunctiva/sclera: Conjunctivae normal.   Cardiovascular:      Rate and Rhythm: Normal rate and regular rhythm.      Pulses: Normal pulses.      Heart sounds: Normal heart sounds. No murmur heard.    No friction rub. No gallop.   Pulmonary:      Effort: Pulmonary effort is normal. No respiratory distress.      Breath sounds:  Normal breath sounds. No wheezing, rhonchi or rales.   Chest:      Chest wall: No tenderness.   Abdominal:      General: There is no distension.      Palpations: Abdomen is soft. There is no mass.      Tenderness: There is no abdominal tenderness. There is no guarding.   Musculoskeletal:         General: No swelling, tenderness or deformity. Normal range of motion.      Cervical back: Normal range of motion.      Right lower leg: No edema.      Left lower leg: No edema.   Skin:     General: Skin is warm and dry.      Capillary Refill: Capillary refill takes less than 2 seconds.      Coloration: Skin is not jaundiced.      Findings: No bruising, erythema, lesion or rash.   Neurological:      Mental Status: She is alert and oriented to person, place, and time.      Gait: Gait normal.   Psychiatric:         Mood and Affect: Mood normal.         Behavior: Behavior normal.         Thought Content: Thought content normal.         Judgment: Judgment normal.         Assessment:       1. Osteopenia after menopause    2. Mixed hyperlipidemia        Plan:       Osteopenia after menopause  -     ergocalciferol (ERGOCALCIFEROL) 50,000 unit Cap; Take 1 capsule (50,000 Units total) by mouth every 7 days.  Dispense: 12 capsule; Refill: 3    Mixed hyperlipidemia         -    Improved on recent check. Will continue to monitor. Has been unable to tolerate medications for this problem. Continue healthy diet and regular exercise.         6 months with Dr. Saldaña to establish       Jose Lopez PA-C  Family Medicine Physician Assistant       I spent a total of 20 minutes on the day of the visit.This includes face to face time and non-face to face time preparing to see the patient (eg, review of tests), obtaining and/or reviewing separately obtained history, documenting clinical information in the electronic or other health record, independently interpreting results and communicating results to the patient/family/caregiver, or care  coordinator.      We have addressed [4] Moderate: 1 or more chronic illnesses with exacerbation, progression, or side effects of treatment / 2 or more stable chronic illnesses / 1 undiagnosed new problem with uncertain prognosis / 1 acute illness with systemic symptoms / 1 acute complicated injury  The complexity of the data reviewed and analyzed for this visit was [2] Minimal or None  The risk of complications and/or morbidity or mortality are [4] Moderate risk (I.e. prescription drug management / decision regarding minor surgery with identified pt or procedure risk factors / decision regarding elective major surgery without identified pt or procedure risk factors / diagnosis or treatment significantly limited by social determinants of health)   The level of Medical Decision Making for this visit is [4] Moderate

## 2022-08-12 ENCOUNTER — OFFICE VISIT (OUTPATIENT)
Dept: PHYSICAL MEDICINE AND REHAB | Facility: CLINIC | Age: 72
End: 2022-08-12
Payer: MEDICARE

## 2022-08-12 VITALS
SYSTOLIC BLOOD PRESSURE: 113 MMHG | HEIGHT: 62 IN | BODY MASS INDEX: 33.43 KG/M2 | DIASTOLIC BLOOD PRESSURE: 67 MMHG | HEART RATE: 65 BPM | WEIGHT: 181.69 LBS

## 2022-08-12 DIAGNOSIS — M25.559 HIP PAIN: Primary | ICD-10-CM

## 2022-08-12 PROCEDURE — 3008F BODY MASS INDEX DOCD: CPT | Mod: CPTII,S$GLB,, | Performed by: PHYSICAL MEDICINE & REHABILITATION

## 2022-08-12 PROCEDURE — 3074F PR MOST RECENT SYSTOLIC BLOOD PRESSURE < 130 MM HG: ICD-10-PCS | Mod: CPTII,S$GLB,, | Performed by: PHYSICAL MEDICINE & REHABILITATION

## 2022-08-12 PROCEDURE — 3008F PR BODY MASS INDEX (BMI) DOCUMENTED: ICD-10-PCS | Mod: CPTII,S$GLB,, | Performed by: PHYSICAL MEDICINE & REHABILITATION

## 2022-08-12 PROCEDURE — 99999 PR PBB SHADOW E&M-EST. PATIENT-LVL III: CPT | Mod: PBBFAC,,, | Performed by: PHYSICAL MEDICINE & REHABILITATION

## 2022-08-12 PROCEDURE — 1157F PR ADVANCE CARE PLAN OR EQUIV PRESENT IN MEDICAL RECORD: ICD-10-PCS | Mod: CPTII,S$GLB,, | Performed by: PHYSICAL MEDICINE & REHABILITATION

## 2022-08-12 PROCEDURE — 99999 PR PBB SHADOW E&M-EST. PATIENT-LVL III: ICD-10-PCS | Mod: PBBFAC,,, | Performed by: PHYSICAL MEDICINE & REHABILITATION

## 2022-08-12 PROCEDURE — 99499 UNLISTED E&M SERVICE: CPT | Mod: S$GLB,,, | Performed by: PHYSICAL MEDICINE & REHABILITATION

## 2022-08-12 PROCEDURE — 1159F MED LIST DOCD IN RCRD: CPT | Mod: CPTII,S$GLB,, | Performed by: PHYSICAL MEDICINE & REHABILITATION

## 2022-08-12 PROCEDURE — 3074F SYST BP LT 130 MM HG: CPT | Mod: CPTII,S$GLB,, | Performed by: PHYSICAL MEDICINE & REHABILITATION

## 2022-08-12 PROCEDURE — 1126F AMNT PAIN NOTED NONE PRSNT: CPT | Mod: CPTII,S$GLB,, | Performed by: PHYSICAL MEDICINE & REHABILITATION

## 2022-08-12 PROCEDURE — 3078F DIAST BP <80 MM HG: CPT | Mod: CPTII,S$GLB,, | Performed by: PHYSICAL MEDICINE & REHABILITATION

## 2022-08-12 PROCEDURE — 1159F PR MEDICATION LIST DOCUMENTED IN MEDICAL RECORD: ICD-10-PCS | Mod: CPTII,S$GLB,, | Performed by: PHYSICAL MEDICINE & REHABILITATION

## 2022-08-12 PROCEDURE — 1157F ADVNC CARE PLAN IN RCRD: CPT | Mod: CPTII,S$GLB,, | Performed by: PHYSICAL MEDICINE & REHABILITATION

## 2022-08-12 PROCEDURE — 99499 NO LOS: ICD-10-PCS | Mod: S$GLB,,, | Performed by: PHYSICAL MEDICINE & REHABILITATION

## 2022-08-12 PROCEDURE — 1126F PR PAIN SEVERITY QUANTIFIED, NO PAIN PRESENT: ICD-10-PCS | Mod: CPTII,S$GLB,, | Performed by: PHYSICAL MEDICINE & REHABILITATION

## 2022-08-12 PROCEDURE — 3078F PR MOST RECENT DIASTOLIC BLOOD PRESSURE < 80 MM HG: ICD-10-PCS | Mod: CPTII,S$GLB,, | Performed by: PHYSICAL MEDICINE & REHABILITATION

## 2022-08-12 NOTE — PROGRESS NOTES
HPI:  Patient is a 71 y.o. year old female s/p percutaneous tenotomy of her right gluteus medius- post op 2 wks  She is doing much better. She states her pain has completely resolved.      Past Medical History:   Diagnosis Date    Abnormal Pap smear 1990    Allergy to statin medication     Anxiety     Arthritis     Asthma     Back pain     Cervical cancer     Depression     GERD (gastroesophageal reflux disease)     H/O head and neck radiation 1/11/2017    Hyperlipidemia     Kidney insufficiency     Multinodular goiter 1/11/2017    Obesity     Smoking 1/11/2017    Uterine cancer      Past Surgical History:   Procedure Laterality Date    ADENOIDECTOMY      ADRENALECTOMY      CARPAL TUNNEL RELEASE      CHOLECYSTECTOMY      COLONOSCOPY N/A 12/21/2017    Procedure: COLONOSCOPY;  Surgeon: Harriett Pereyra MD;  Location: [facility] ENDO;  Service: Endoscopy;  Laterality: N/A;    ESOPHAGOGASTRODUODENOSCOPY N/A 7/18/2018    Procedure: EGD (ESOPHAGOGASTRODUODENOSCOPY);  Surgeon: Harriett Pereyra MD;  Location: [facility] ENDO;  Service: Endoscopy;  Laterality: N/A;    EYE SURGERY      HYSTERECTOMY      OOPHORECTOMY      PERCUTANEOUS TENOTOMY Right 7/27/2022    Procedure: TENOTOMY, PERCUTANEOUS;  Surgeon: Doris Galan DO;  Location: [facility] OR;  Service: Orthopedics;  Laterality: Right;    TONSILLECTOMY       Family History   Problem Relation Age of Onset    Heart disease Mother         MI    Heart disease Father         MI    Hyperlipidemia Brother     ANTHONY disease Daughter     Diabetes Son     ANTHONY disease Maternal Grandmother     Bladder Cancer Paternal Grandmother     Heart disease Paternal Grandfather         MI    Breast cancer Neg Hx     Colon cancer Neg Hx     Ovarian cancer Neg Hx     Cancer Neg Hx      Social History     Socioeconomic History    Marital status:     Number of children: 2    Years of education: 13    Highest education level: Some college, no degree    Occupational History    Occupation: dinora     Employer: [employer]   Tobacco Use    Smoking status: Current Some Day Smoker     Packs/day: 0.25     Years: 20.00     Pack years: 5.00     Types: Cigarettes     Last attempt to quit: 2017     Years since quittin.6    Smokeless tobacco: Never Used   Substance and Sexual Activity    Alcohol use: No    Drug use: No    Sexual activity: Yes     Partners: Male     Social Determinants of Health     Financial Resource Strain: Low Risk     Difficulty of Paying Living Expenses: Not hard at all   Food Insecurity: No Food Insecurity    Worried About Running Out of Food in the Last Year: Never true    Ran Out of Food in the Last Year: Never true   Transportation Needs: No Transportation Needs    Lack of Transportation (Medical): No    Lack of Transportation (Non-Medical): No   Physical Activity: Inactive    Days of Exercise per Week: 0 days    Minutes of Exercise per Session: 0 min   Stress: No Stress Concern Present    Feeling of Stress : Not at all   Social Connections: Socially Isolated    Frequency of Communication with Friends and Family: More than three times a week    Frequency of Social Gatherings with Friends and Family: More than three times a week    Attends Sabianism Services: Never    Active Member of Clubs or Organizations: No    Attends Club or Organization Meetings: Never    Marital Status:    Housing Stability: Low Risk     Unable to Pay for Housing in the Last Year: No    Number of Places Lived in the Last Year: 1    Unstable Housing in the Last Year: No       Review of patient's allergies indicates:   Allergen Reactions    Flu vaccine  (36 mos+)(pf) Anaphylaxis    Iodine and iodide containing products     Lipitor [atorvastatin]     Niacin preparations     Statins-hmg-coa reductase inhibitors Hives    Welchol [colesevelam] Other (See Comments)     GI upset and insomnia    Zetia [ezetimibe] Nausea And Vomiting        Current Outpatient Medications:     albuterol (VENTOLIN HFA) 90 mcg/actuation inhaler, Inhale 2 puffs into the lungs every 6 (six) hours as needed for Wheezing. Rescue, Disp: 18 g, Rfl: 0    amitriptyline (ELAVIL) 10 MG tablet, Take 1 tablet (10 mg total) by mouth nightly as needed for Insomnia., Disp: 30 tablet, Rfl: 1    clindamycin phosphate 1% (CLINDAGEL) 1 % gel, Apply topically 2 (two) times daily., Disp: 60 g, Rfl: 3    co-enzyme Q-10 30 mg capsule, Take 30 mg by mouth once daily., Disp: , Rfl:     diclofenac sodium (VOLTAREN) 1 % Gel, Apply 2 g topically once daily., Disp: 100 g, Rfl: 3    ergocalciferol (ERGOCALCIFEROL) 50,000 unit Cap, Take 1 capsule (50,000 Units total) by mouth every 7 days., Disp: 12 capsule, Rfl: 3    evolocumab (REPATHA PUSHTRONEX) 420 mg/3.5 mL Injt, Inject 3.5 mLs (420 mg total) into the skin every 30 days. (Patient not taking: Reported on 8/3/2022), Disp: 3.5 mL, Rfl: 5    fluocinolone acetonide oiL 0.01 % Drop, 3-4 drops to the affected ear(s) twice daily no more than a week at a time as needed for itching and scaling (Patient not taking: Reported on 8/3/2022), Disp: 20 mL, Rfl: 0    fluticasone (FLONASE) 50 mcg/actuation nasal spray, 2 sprays (100 mcg total) by Each Nare route once daily., Disp: 16 g, Rfl: 11    gabapentin (NEURONTIN) 300 MG capsule, 1 p.o. in the morning 1 p.o. in the afternoon and 2 p.o. at night (Patient taking differently: daily as needed.), Disp: 120 capsule, Rfl: 11    GARLIC (GARLIQUE ORAL), Take 1 capsule by mouth once daily., Disp: , Rfl:     magnesium oxide (MAG-OX) 400 mg (241.3 mg magnesium) tablet, Take 400 mg by mouth once daily., Disp: , Rfl:     MULTIVITAMIN (MULTIPLE VITAMINS ORAL), Take by mouth., Disp: , Rfl:     nitroGLYCERIN (NITROSTAT) 0.4 MG SL tablet, Place 1 tablet (0.4 mg total) under the tongue every 5 (five) minutes as needed for Chest pain., Disp: 20 tablet, Rfl: 1    red yeast rice 600 mg Cap, Take 2 capsules by  mouth every evening., Disp: , Rfl:     sertraline (ZOLOFT) 100 MG tablet, Take 1.5 tablets by mouth once daily. 125mg daily, Disp: , Rfl:     sertraline (ZOLOFT) 25 MG tablet, Take 25 mg by mouth once daily. Takes 125 mg daily, Disp: , Rfl:           Review of Systems  No nausea, vomiting, fevers, Chills , contipation, diarrhea or sweats    Physical Exam:      Reviewed    alert and oriented ×4 follows commands answers all questions appropriately,affect wnl  Manual muscle test 5 out of 5 sensation to light touch grossly intact  Healed incision scar  No C/C/E      Assessment:  S/p percutaneous tentomy of right gluteus medius- pain resolved    Plan:  RTC prn

## 2022-08-16 ENCOUNTER — OFFICE VISIT (OUTPATIENT)
Dept: URGENT CARE | Facility: CLINIC | Age: 72
End: 2022-08-16
Payer: MEDICARE

## 2022-08-16 VITALS
SYSTOLIC BLOOD PRESSURE: 109 MMHG | DIASTOLIC BLOOD PRESSURE: 71 MMHG | WEIGHT: 184 LBS | BODY MASS INDEX: 33.86 KG/M2 | RESPIRATION RATE: 18 BRPM | HEART RATE: 68 BPM | OXYGEN SATURATION: 97 % | TEMPERATURE: 99 F | HEIGHT: 62 IN

## 2022-08-16 DIAGNOSIS — R89.4 INFLUENZA A VIRUS NOT DETECTED: ICD-10-CM

## 2022-08-16 DIAGNOSIS — Z20.822 COVID-19 VIRUS NOT DETECTED: ICD-10-CM

## 2022-08-16 DIAGNOSIS — J32.9 SINUSITIS, UNSPECIFIED CHRONICITY, UNSPECIFIED LOCATION: ICD-10-CM

## 2022-08-16 DIAGNOSIS — J02.9 SORE THROAT: Primary | ICD-10-CM

## 2022-08-16 LAB
CTP QC/QA: YES
CTP QC/QA: YES
FLUAV AG NPH QL: NEGATIVE
FLUBV AG NPH QL: NEGATIVE
SARS-COV-2 AG RESP QL IA.RAPID: NEGATIVE

## 2022-08-16 PROCEDURE — 1157F PR ADVANCE CARE PLAN OR EQUIV PRESENT IN MEDICAL RECORD: ICD-10-PCS | Mod: CPTII,S$GLB,, | Performed by: NURSE PRACTITIONER

## 2022-08-16 PROCEDURE — 1160F RVW MEDS BY RX/DR IN RCRD: CPT | Mod: CPTII,S$GLB,, | Performed by: NURSE PRACTITIONER

## 2022-08-16 PROCEDURE — 87804 POCT INFLUENZA A/B: ICD-10-PCS | Mod: QW,,, | Performed by: NURSE PRACTITIONER

## 2022-08-16 PROCEDURE — 3008F PR BODY MASS INDEX (BMI) DOCUMENTED: ICD-10-PCS | Mod: CPTII,S$GLB,, | Performed by: NURSE PRACTITIONER

## 2022-08-16 PROCEDURE — 3078F PR MOST RECENT DIASTOLIC BLOOD PRESSURE < 80 MM HG: ICD-10-PCS | Mod: CPTII,S$GLB,, | Performed by: NURSE PRACTITIONER

## 2022-08-16 PROCEDURE — 99214 OFFICE O/P EST MOD 30 MIN: CPT | Mod: S$GLB,,, | Performed by: NURSE PRACTITIONER

## 2022-08-16 PROCEDURE — 3008F BODY MASS INDEX DOCD: CPT | Mod: CPTII,S$GLB,, | Performed by: NURSE PRACTITIONER

## 2022-08-16 PROCEDURE — 1160F PR REVIEW ALL MEDS BY PRESCRIBER/CLIN PHARMACIST DOCUMENTED: ICD-10-PCS | Mod: CPTII,S$GLB,, | Performed by: NURSE PRACTITIONER

## 2022-08-16 PROCEDURE — 87811 SARS CORONAVIRUS 2 ANTIGEN POCT, MANUAL READ: ICD-10-PCS | Mod: QW,S$GLB,, | Performed by: NURSE PRACTITIONER

## 2022-08-16 PROCEDURE — 3078F DIAST BP <80 MM HG: CPT | Mod: CPTII,S$GLB,, | Performed by: NURSE PRACTITIONER

## 2022-08-16 PROCEDURE — 1157F ADVNC CARE PLAN IN RCRD: CPT | Mod: CPTII,S$GLB,, | Performed by: NURSE PRACTITIONER

## 2022-08-16 PROCEDURE — 3074F SYST BP LT 130 MM HG: CPT | Mod: CPTII,S$GLB,, | Performed by: NURSE PRACTITIONER

## 2022-08-16 PROCEDURE — 87811 SARS-COV-2 COVID19 W/OPTIC: CPT | Mod: QW,S$GLB,, | Performed by: NURSE PRACTITIONER

## 2022-08-16 PROCEDURE — 1159F PR MEDICATION LIST DOCUMENTED IN MEDICAL RECORD: ICD-10-PCS | Mod: CPTII,S$GLB,, | Performed by: NURSE PRACTITIONER

## 2022-08-16 PROCEDURE — 3074F PR MOST RECENT SYSTOLIC BLOOD PRESSURE < 130 MM HG: ICD-10-PCS | Mod: CPTII,S$GLB,, | Performed by: NURSE PRACTITIONER

## 2022-08-16 PROCEDURE — 87804 INFLUENZA ASSAY W/OPTIC: CPT | Mod: QW,,, | Performed by: NURSE PRACTITIONER

## 2022-08-16 PROCEDURE — 1159F MED LIST DOCD IN RCRD: CPT | Mod: CPTII,S$GLB,, | Performed by: NURSE PRACTITIONER

## 2022-08-16 PROCEDURE — 99214 PR OFFICE/OUTPT VISIT, EST, LEVL IV, 30-39 MIN: ICD-10-PCS | Mod: S$GLB,,, | Performed by: NURSE PRACTITIONER

## 2022-08-16 RX ORDER — AZITHROMYCIN 250 MG/1
TABLET, FILM COATED ORAL
Qty: 6 TABLET | Refills: 0 | Status: SHIPPED | OUTPATIENT
Start: 2022-08-16 | End: 2022-11-22 | Stop reason: ALTCHOICE

## 2022-08-16 RX ORDER — FLUTICASONE PROPIONATE 50 MCG
1 SPRAY, SUSPENSION (ML) NASAL DAILY
Qty: 15.8 ML | Refills: 0 | Status: SHIPPED | OUTPATIENT
Start: 2022-08-16

## 2022-08-16 RX ORDER — CETIRIZINE HYDROCHLORIDE 10 MG/1
10 TABLET ORAL DAILY
Qty: 30 TABLET | Refills: 0 | Status: SHIPPED | OUTPATIENT
Start: 2022-08-16 | End: 2022-11-22

## 2022-08-16 RX ORDER — PROMETHAZINE HYDROCHLORIDE AND DEXTROMETHORPHAN HYDROBROMIDE 6.25; 15 MG/5ML; MG/5ML
5 SYRUP ORAL EVERY 4 HOURS PRN
Qty: 118 ML | Refills: 0 | Status: SHIPPED | OUTPATIENT
Start: 2022-08-16 | End: 2022-08-26

## 2022-08-16 RX ORDER — BENZONATATE 100 MG/1
100 CAPSULE ORAL 3 TIMES DAILY PRN
Qty: 30 CAPSULE | Refills: 0 | Status: SHIPPED | OUTPATIENT
Start: 2022-08-16 | End: 2022-08-26

## 2022-08-16 NOTE — PROGRESS NOTES
"Subjective:       Patient ID: Syeda Gonsalves is a 71 y.o. female.    Vitals:  height is 5' 2" (1.575 m) and weight is 83.5 kg (184 lb). Her oral temperature is 98.9 °F (37.2 °C). Her blood pressure is 109/71 and her pulse is 68. Her respiration is 18 and oxygen saturation is 97%.     Chief Complaint: Sore Throat    Pt started with a sore throat yellow mucus in the chest. Pt took a home covid test yesterday it was neg. Pt symptoms started Sunday. Pt took tylenol yesterday but nothing today.     Sore Throat   This is a new problem. The problem has been gradually worsening. Associated symptoms include congestion, coughing and headaches. Pertinent negatives include no diarrhea, ear pain, shortness of breath, stridor or vomiting. She has tried nothing for the symptoms. The treatment provided no relief.       Constitution: Positive for fatigue. Negative for chills and fever.   HENT: Positive for congestion, postnasal drip and sore throat. Negative for ear pain.    Cardiovascular: Negative for chest pain.   Respiratory: Positive for cough, sputum production (Yellow) and wheezing. Negative for chest tightness, shortness of breath and stridor.    Gastrointestinal: Negative for nausea, vomiting and diarrhea.   Musculoskeletal: Negative for muscle cramps.   Neurological: Positive for headaches.       Objective:      Physical Exam   Constitutional: She is oriented to person, place, and time. She appears well-developed.  Non-toxic appearance. She does not appear ill. No distress.   HENT:   Head: Normocephalic and atraumatic.   Ears:   Right Ear: Tympanic membrane normal.   Left Ear: Tympanic membrane normal.   Nose: Rhinorrhea and congestion present.   Mouth/Throat: Mucous membranes are moist. Posterior oropharyngeal erythema present. No oropharyngeal exudate.   Eyes: Conjunctivae and EOM are normal. Right eye exhibits no discharge. Left eye exhibits no discharge.   Neck: Neck supple. No neck rigidity present. "   Cardiovascular: Normal rate, regular rhythm and normal heart sounds.   Pulmonary/Chest: Effort normal and breath sounds normal. No respiratory distress. She has no wheezes. She has no rhonchi.   Abdominal: Normal appearance.   Musculoskeletal:      Cervical back: She exhibits no tenderness.   Lymphadenopathy:     She has no cervical adenopathy.   Neurological: no focal deficit. She is alert and oriented to person, place, and time.   Skin: Skin is warm, dry and not diaphoretic. Capillary refill takes 2 to 3 seconds.   Psychiatric: Her behavior is normal. Mood normal.   Nursing note and vitals reviewed.        Assessment:       1. Sore throat    2. COVID-19 virus not detected    3. Influenza A virus not detected    4. Sinusitis, unspecified chronicity, unspecified location        Influenza a/b neg  Plan:        Patient is concern for bronchitis and requesting a Z-Digna.  Discussed symptoms are more consistent with a viral upper respiratory infection.  The patient agreed to hold antibiotics until symptoms worsen and go into her chest    Sore throat  -     SARS Coronavirus 2 Antigen, POCT Manual Read  -     POCT Influenza A/B    COVID-19 virus not detected    Influenza A virus not detected    Sinusitis, unspecified chronicity, unspecified location  -     fluticasone propionate (FLONASE) 50 mcg/actuation nasal spray; 1 spray (50 mcg total) by Each Nostril route once daily.  Dispense: 15.8 mL; Refill: 0  -     cetirizine (ZYRTEC) 10 MG tablet; Take 1 tablet (10 mg total) by mouth once daily.  Dispense: 30 tablet; Refill: 0  -     benzonatate (TESSALON) 100 MG capsule; Take 1 capsule (100 mg total) by mouth 3 (three) times daily as needed for Cough.  Dispense: 30 capsule; Refill: 0  -     promethazine-dextromethorphan (PROMETHAZINE-DM) 6.25-15 mg/5 mL Syrp; Take 5 mLs by mouth every 4 (four) hours as needed (cough).  Dispense: 118 mL; Refill: 0  -     azithromycin (Z-DIGNA) 250 MG tablet; Take 2 tablets by mouth on day 1;  Take 1 tablet by mouth on days 2-5  Dispense: 6 tablet; Refill: 0    Symptomatic treatment to include:    Rest, increase fluid intake to include 50 % water, 50% electrolyte replacement  Ibuprofen/Tylenol as directed for fever, sore throat, headache, body aches.  Tylenol helps with fever but ibuprofen or aleve helps best for other symptoms.   Always take ibuprofen and or Aleve with food as repeated use can cause stomach irritation.  It is also advised to start taking Pepcid 20 mg over-the-counter twice a day for 7-10 days whenever taking NSAIDs for extended times for stomach protection  Zrytec and flonase for sinus symptoms and to reduce inflammation.   Phenergan cough syrup at night for cough.  Will cause drowsiness  Tessalon perles cough pills as needed day or night.  Can be taken together with cough syrup if desired.  Helps best for dry, throat irritation cough.  Mucinex D over the counter as directed for sinus congestion.    Warm, salt water gargles, over the counter throat lozenges or sprays as desires.   Liquid benadryl and maalox 1 to 1 concentration, gargle and spit for temporary relief for sore throat.    ER for difficulty breathing not relieved by rest, excessive lethargy and/or change in mental status  Return to clinic for wheezing, shortness of breath, chest tightness, vomiting for re-evaluation and possible need for additional medications for the symptoms

## 2022-08-16 NOTE — PATIENT INSTRUCTIONS
Symptomatic treatment to include:    Rest, increase fluid intake to include 50 % water, 50% electrolyte replacement  Ibuprofen/Tylenol as directed for fever, sore throat, headache, body aches.  Tylenol helps with fever but ibuprofen or aleve helps best for other symptoms.   Always take ibuprofen and or Aleve with food as repeated use can cause stomach irritation.  It is also advised to start taking Pepcid 20 mg over-the-counter twice a day for 7-10 days whenever taking NSAIDs for extended times for stomach protection  Zrytec and flonase for sinus symptoms and to reduce inflammation.   Phenergan cough syrup at night for cough.  Will cause drowsiness  Tessalon perles cough pills as needed day or night.  Can be taken together with cough syrup if desired.  Helps best for dry, throat irritation cough.  Mucinex D over the counter as directed for sinus congestion.    Warm, salt water gargles, over the counter throat lozenges or sprays as desires.   Liquid benadryl and maalox 1 to 1 concentration, gargle and spit for temporary relief for sore throat.    ER for difficulty breathing not relieved by rest, excessive lethargy and/or change in mental status  Return to clinic for wheezing, shortness of breath, chest tightness, vomiting for re-evaluation and possible need for additional medications for the symptoms

## 2022-10-14 NOTE — TELEPHONE ENCOUNTER
Appointment rescheduled for the date of 4-. Patient agreed to appointment date, time, and location.    .  Labs, Imaging and EKG all personally reviewed by the attending physician.

## 2022-11-21 ENCOUNTER — TELEPHONE (OUTPATIENT)
Dept: FAMILY MEDICINE | Facility: CLINIC | Age: 72
End: 2022-11-21
Payer: MEDICARE

## 2022-11-21 NOTE — TELEPHONE ENCOUNTER
----- Message from Shaji Jean sent at 11/21/2022  2:27 PM CST -----  Contact: patient  Type:  Patient Call          Who Called:        Does the patient know what this is regarding?: requesting a call back ; pt said she have the flu and she took a leave of absent from her job because she's been to sick to return to work ;pt said she'll take a virtual appt if possible ;please advise           Would the patient rather a call back or a response via [facility]? Call           Best Call Back Number: [phone]             Additional Information:

## 2022-11-22 ENCOUNTER — TELEPHONE (OUTPATIENT)
Dept: FAMILY MEDICINE | Facility: CLINIC | Age: 72
End: 2022-11-22
Payer: MEDICARE

## 2022-11-22 ENCOUNTER — OFFICE VISIT (OUTPATIENT)
Dept: URGENT CARE | Facility: CLINIC | Age: 72
End: 2022-11-22
Payer: MEDICARE

## 2022-11-22 ENCOUNTER — HOSPITAL ENCOUNTER (EMERGENCY)
Facility: HOSPITAL | Age: 72
Discharge: HOME | End: 2022-11-22
Attending: EMERGENCY MEDICINE
Payer: MEDICARE

## 2022-11-22 VITALS
DIASTOLIC BLOOD PRESSURE: 73 MMHG | HEIGHT: 62 IN | TEMPERATURE: 98 F | OXYGEN SATURATION: 98 % | WEIGHT: 179.81 LBS | RESPIRATION RATE: 18 BRPM | HEART RATE: 60 BPM | BODY MASS INDEX: 33.09 KG/M2 | SYSTOLIC BLOOD PRESSURE: 134 MMHG

## 2022-11-22 VITALS
RESPIRATION RATE: 18 BRPM | HEART RATE: 55 BPM | BODY MASS INDEX: 32.94 KG/M2 | SYSTOLIC BLOOD PRESSURE: 132 MMHG | TEMPERATURE: 97 F | WEIGHT: 179 LBS | DIASTOLIC BLOOD PRESSURE: 62 MMHG | OXYGEN SATURATION: 100 % | HEIGHT: 62 IN

## 2022-11-22 DIAGNOSIS — R19.7 DIARRHEA, UNSPECIFIED TYPE: Primary | ICD-10-CM

## 2022-11-22 DIAGNOSIS — R11.10 VOMITING: ICD-10-CM

## 2022-11-22 DIAGNOSIS — R11.10 VOMITING, UNSPECIFIED VOMITING TYPE, UNSPECIFIED WHETHER NAUSEA PRESENT: ICD-10-CM

## 2022-11-22 DIAGNOSIS — R11.0 NAUSEA: ICD-10-CM

## 2022-11-22 LAB
ALBUMIN SERPL BCP-MCNC: 3.5 G/DL (ref 3.5–5.2)
ALP SERPL-CCNC: 62 U/L (ref 55–135)
ALT SERPL W/O P-5'-P-CCNC: 20 U/L (ref 10–44)
ANION GAP SERPL CALC-SCNC: 10 MMOL/L (ref 8–16)
AST SERPL-CCNC: 34 U/L (ref 10–40)
BASOPHILS # BLD AUTO: 0.02 K/UL (ref 0–0.2)
BASOPHILS NFR BLD: 0.3 % (ref 0–1.9)
BILIRUB SERPL-MCNC: 0.6 MG/DL (ref 0.1–1)
BUN SERPL-MCNC: 15 MG/DL (ref 8–23)
CALCIUM SERPL-MCNC: 8.8 MG/DL (ref 8.7–10.5)
CHLORIDE SERPL-SCNC: 102 MMOL/L (ref 95–110)
CO2 SERPL-SCNC: 27 MMOL/L (ref 23–29)
CREAT SERPL-MCNC: 0.8 MG/DL (ref 0.5–1.4)
DIFFERENTIAL METHOD: ABNORMAL
EOSINOPHIL # BLD AUTO: 0.1 K/UL (ref 0–0.5)
EOSINOPHIL NFR BLD: 1.4 % (ref 0–8)
ERYTHROCYTE [DISTWIDTH] IN BLOOD BY AUTOMATED COUNT: 13.1 % (ref 11.5–14.5)
EST. GFR  (NO RACE VARIABLE): >60 ML/MIN/1.73 M^2
GLUCOSE SERPL-MCNC: 96 MG/DL (ref 70–110)
HCT VFR BLD AUTO: 45.3 % (ref 37–48.5)
HGB BLD-MCNC: 15.2 G/DL (ref 12–16)
IMM GRANULOCYTES # BLD AUTO: 0.03 K/UL (ref 0–0.04)
IMM GRANULOCYTES NFR BLD AUTO: 0.4 % (ref 0–0.5)
LIPASE SERPL-CCNC: 37 U/L (ref 4–60)
LYMPHOCYTES # BLD AUTO: 1.2 K/UL (ref 1–4.8)
LYMPHOCYTES NFR BLD: 16.6 % (ref 18–48)
MCH RBC QN AUTO: 28.6 PG (ref 27–31)
MCHC RBC AUTO-ENTMCNC: 33.6 G/DL (ref 32–36)
MCV RBC AUTO: 85 FL (ref 82–98)
MONOCYTES # BLD AUTO: 1 K/UL (ref 0.3–1)
MONOCYTES NFR BLD: 13.4 % (ref 4–15)
NEUTROPHILS # BLD AUTO: 5 K/UL (ref 1.8–7.7)
NEUTROPHILS NFR BLD: 67.9 % (ref 38–73)
NRBC BLD-RTO: 0 /100 WBC
PLATELET # BLD AUTO: 182 K/UL (ref 150–450)
PMV BLD AUTO: 10.6 FL (ref 9.2–12.9)
POTASSIUM SERPL-SCNC: 3.1 MMOL/L (ref 3.5–5.1)
PROT SERPL-MCNC: 7 G/DL (ref 6–8.4)
RBC # BLD AUTO: 5.31 M/UL (ref 4–5.4)
SODIUM SERPL-SCNC: 139 MMOL/L (ref 136–145)
TROPONIN I SERPL DL<=0.01 NG/ML-MCNC: 0.01 NG/ML (ref 0–0.03)
WBC # BLD AUTO: 7.31 K/UL (ref 3.9–12.7)

## 2022-11-22 PROCEDURE — 1159F MED LIST DOCD IN RCRD: CPT | Mod: CPTII,S$GLB,,

## 2022-11-22 PROCEDURE — 99214 PR OFFICE/OUTPT VISIT, EST, LEVL IV, 30-39 MIN: ICD-10-PCS | Mod: S$GLB,,,

## 2022-11-22 PROCEDURE — 93010 EKG 12-LEAD: ICD-10-PCS | Mod: ,,, | Performed by: SPECIALIST

## 2022-11-22 PROCEDURE — 93005 ELECTROCARDIOGRAM TRACING: CPT

## 2022-11-22 PROCEDURE — 63600175 PHARM REV CODE 636 W HCPCS: Performed by: EMERGENCY MEDICINE

## 2022-11-22 PROCEDURE — 25000003 PHARM REV CODE 250: Performed by: EMERGENCY MEDICINE

## 2022-11-22 PROCEDURE — 85025 COMPLETE CBC W/AUTO DIFF WBC: CPT | Performed by: EMERGENCY MEDICINE

## 2022-11-22 PROCEDURE — 3078F DIAST BP <80 MM HG: CPT | Mod: CPTII,S$GLB,,

## 2022-11-22 PROCEDURE — 3075F PR MOST RECENT SYSTOLIC BLOOD PRESS GE 130-139MM HG: ICD-10-PCS | Mod: CPTII,S$GLB,,

## 2022-11-22 PROCEDURE — 83690 ASSAY OF LIPASE: CPT | Performed by: EMERGENCY MEDICINE

## 2022-11-22 PROCEDURE — 93010 ELECTROCARDIOGRAM REPORT: CPT | Mod: ,,, | Performed by: SPECIALIST

## 2022-11-22 PROCEDURE — 96361 HYDRATE IV INFUSION ADD-ON: CPT

## 2022-11-22 PROCEDURE — 96374 THER/PROPH/DIAG INJ IV PUSH: CPT

## 2022-11-22 PROCEDURE — 1157F PR ADVANCE CARE PLAN OR EQUIV PRESENT IN MEDICAL RECORD: ICD-10-PCS | Mod: CPTII,S$GLB,,

## 2022-11-22 PROCEDURE — 3008F BODY MASS INDEX DOCD: CPT | Mod: CPTII,S$GLB,,

## 2022-11-22 PROCEDURE — 1157F ADVNC CARE PLAN IN RCRD: CPT | Mod: CPTII,S$GLB,,

## 2022-11-22 PROCEDURE — 1160F RVW MEDS BY RX/DR IN RCRD: CPT | Mod: CPTII,S$GLB,,

## 2022-11-22 PROCEDURE — 80053 COMPREHEN METABOLIC PANEL: CPT | Performed by: EMERGENCY MEDICINE

## 2022-11-22 PROCEDURE — 3008F PR BODY MASS INDEX (BMI) DOCUMENTED: ICD-10-PCS | Mod: CPTII,S$GLB,,

## 2022-11-22 PROCEDURE — 1160F PR REVIEW ALL MEDS BY PRESCRIBER/CLIN PHARMACIST DOCUMENTED: ICD-10-PCS | Mod: CPTII,S$GLB,,

## 2022-11-22 PROCEDURE — 84484 ASSAY OF TROPONIN QUANT: CPT | Performed by: EMERGENCY MEDICINE

## 2022-11-22 PROCEDURE — 36415 COLL VENOUS BLD VENIPUNCTURE: CPT | Performed by: EMERGENCY MEDICINE

## 2022-11-22 PROCEDURE — 1159F PR MEDICATION LIST DOCUMENTED IN MEDICAL RECORD: ICD-10-PCS | Mod: CPTII,S$GLB,,

## 2022-11-22 PROCEDURE — 99284 EMERGENCY DEPT VISIT MOD MDM: CPT | Mod: 25

## 2022-11-22 PROCEDURE — 3075F SYST BP GE 130 - 139MM HG: CPT | Mod: CPTII,S$GLB,,

## 2022-11-22 PROCEDURE — 3078F PR MOST RECENT DIASTOLIC BLOOD PRESSURE < 80 MM HG: ICD-10-PCS | Mod: CPTII,S$GLB,,

## 2022-11-22 PROCEDURE — 99214 OFFICE O/P EST MOD 30 MIN: CPT | Mod: S$GLB,,,

## 2022-11-22 RX ORDER — ONDANSETRON 2 MG/ML
4 INJECTION INTRAMUSCULAR; INTRAVENOUS
Status: COMPLETED | OUTPATIENT
Start: 2022-11-22 | End: 2022-11-22

## 2022-11-22 RX ORDER — LIDOCAINE HYDROCHLORIDE 20 MG/ML
15 SOLUTION OROPHARYNGEAL ONCE
Status: COMPLETED | OUTPATIENT
Start: 2022-11-22 | End: 2022-11-22

## 2022-11-22 RX ORDER — LANOLIN ALCOHOL/MO/W.PET/CERES
400 CREAM (GRAM) TOPICAL
Status: COMPLETED | OUTPATIENT
Start: 2022-11-22 | End: 2022-11-22

## 2022-11-22 RX ORDER — ONDANSETRON 4 MG/1
4 TABLET, ORALLY DISINTEGRATING ORAL EVERY 8 HOURS PRN
Qty: 12 TABLET | Refills: 0 | Status: SHIPPED | OUTPATIENT
Start: 2022-11-22

## 2022-11-22 RX ORDER — MAG HYDROX/ALUMINUM HYD/SIMETH 200-200-20
30 SUSPENSION, ORAL (FINAL DOSE FORM) ORAL ONCE
Status: COMPLETED | OUTPATIENT
Start: 2022-11-22 | End: 2022-11-22

## 2022-11-22 RX ADMIN — SODIUM CHLORIDE 1000 ML: 0.9 INJECTION, SOLUTION INTRAVENOUS at 08:11

## 2022-11-22 RX ADMIN — Medication 400 MG: at 10:11

## 2022-11-22 RX ADMIN — ONDANSETRON 4 MG: 2 INJECTION INTRAMUSCULAR; INTRAVENOUS at 08:11

## 2022-11-22 RX ADMIN — ALUMINUM HYDROXIDE, MAGNESIUM HYDROXIDE, AND SIMETHICONE 30 ML: 200; 200; 20 SUSPENSION ORAL at 09:11

## 2022-11-22 RX ADMIN — POTASSIUM BICARBONATE 50 MEQ: 977.5 TABLET, EFFERVESCENT ORAL at 10:11

## 2022-11-22 RX ADMIN — LIDOCAINE HYDROCHLORIDE 15 ML: 20 SOLUTION ORAL; TOPICAL at 09:11

## 2022-11-22 NOTE — TELEPHONE ENCOUNTER
Pt states that she is currently on leave of absence at work. She states that she is to ill to go to work. Will go to [place] to be evaluated. No further questions at this time.       ----- Message from Johana Domingo sent at 11/22/2022 11:33 AM CST -----  Contact: Patient  Type: Patient Call Back         Who called: Patient         What is the request in detail: calling to get a sooner appt date than 12/9; state she needs FLMA forms asap; please advise         Best call back number: [phone]         Additional Information: needs before 11/29; needs early like 8am           Thank You

## 2022-11-22 NOTE — TELEPHONE ENCOUNTER
----- Message from Johana Domingo sent at 11/22/2022 11:33 AM CST -----  Contact: Patient  Type: Patient Call Back         Who called: Patient         What is the request in detail: calling to get a sooner appt date than 12/9; state she needs FLMA forms asap; please advise         Best call back number: [phone]         Additional Information: needs before 11/29; needs early like 8am           Thank You

## 2022-11-22 NOTE — Clinical Note
"Syeda"Tamela Gonsalves was seen and treated in our emergency department on 11/22/2022.  She may return to work on 11/29/2022.       If you have any questions or concerns, please don't hesitate to call.      Piyush Neil MD"

## 2022-11-22 NOTE — TELEPHONE ENCOUNTER
Spoke to pt.   Pt is going to [facility] for n/v, diarrhea, abd pain since Friday. Pt stated she was heading there now and will call back to clinic if she needed anything further.

## 2022-11-23 ENCOUNTER — TELEPHONE (OUTPATIENT)
Dept: FAMILY MEDICINE | Facility: CLINIC | Age: 72
End: 2022-11-23
Payer: MEDICARE

## 2022-11-23 NOTE — TELEPHONE ENCOUNTER
----- Message from Starr Kaplan sent at 11/23/2022  1:03 PM CST -----  Contact: Self  Type:  Sooner Apoointment Request    Caller is requesting a sooner appointment.  Caller declined first available appointment listed below.  Caller will not accept being placed on the waitlist and is requesting a message be sent to doctor.  Name of Caller: Patient  When is the first available appointment?3/29  Symptoms:FMLA/ Est care  Would the patient rather a call back or a response via [facility]? Call  Best Call Back Number:[phone]   Additional Information:  States she was assigned Dr Jo by [facility]

## 2022-11-23 NOTE — PROGRESS NOTES
"Subjective:       Patient ID: Syeda Gonsalves is a 71 y.o. female.    Vitals:  height is 5' 2" (1.575 m) and weight is 81.6 kg (179 lb 12.8 oz). Her oral temperature is 98.1 °F (36.7 °C). Her blood pressure is 134/73 and her pulse is 60. Her respiration is 18 and oxygen saturation is 98%.     Chief Complaint: GI Problem (Diarrhea, vomiting, burning in stomach x 4 days.) and Generalized Body Aches    GI Problem  The primary symptoms include abdominal pain, nausea, vomiting and diarrhea. Primary symptoms do not include fever.   The illness is also significant for chills. The illness does not include constipation.     Constitution: Positive for appetite change and chills. Negative for activity change, sweating and fever.   HENT:  Negative for ear pain, sinus pain, sinus pressure and sore throat.    Neck: Negative for neck pain.   Cardiovascular:  Negative for chest pain.   Eyes:  Negative for blurred vision.   Respiratory:  Negative for chest tightness, cough and shortness of breath.    Gastrointestinal:  Positive for abdominal pain, nausea, vomiting, diarrhea and dark colored stools. Negative for constipation, rectal bleeding and rectal pain.   Neurological:  Negative for dizziness and history of vertigo.     Objective:      Physical Exam   Constitutional: She is oriented to person, place, and time.  Non-toxic appearance. She does not appear ill. No distress.   Eyes: Conjunctivae are normal. Extraocular movement intact   Cardiovascular: Normal rate, normal heart sounds and normal pulses.   Pulmonary/Chest: Effort normal and breath sounds normal. No respiratory distress. She has no wheezes.   Abdominal: Bowel sounds are increased. There is abdominal tenderness in the right upper quadrant. There is negative Nugent's sign, no left CVA tenderness and no right CVA tenderness.   Neurological: no focal deficit. She is alert and oriented to person, place, and time.   Skin: Skin is not diaphoretic. Capillary refill takes 2 " to 3 seconds.   Psychiatric: Her behavior is normal. Mood normal.       Assessment:       1. Diarrhea, unspecified type    2. Vomiting, unspecified vomiting type, unspecified whether nausea present    3. Nausea          Plan:         Diarrhea, unspecified type    Vomiting, unspecified vomiting type, unspecified whether nausea present    Nausea       Pt presents with diarrhea, nausea/vomiting and abdominal pain x 4 days, pertinent history GERD, cholestomy IBS, Cholecystectomy. pt states stool is black as an Ace of spades, liquid in nature, denies history of GI bleed or diverticulitis, not currently taking pepto, denies any urinary symptoms, reports 15 loose stools today that were all black in color and 2 episodes of emesis, she is worried she may be dehydrated, pt has RUQ and epigastric pain with no guarding, pt instructed to go straight to the ER for further evaluation and treatment pt informed by not going could lead to worsening of symptoms and or death, pt verbalized understanding, EMS transport offered, pt declined, pt will drive herself to ER, AMA signed.

## 2022-11-23 NOTE — ED PROVIDER NOTES
Encounter Date: 11/22/2022    SCRIBE #1 NOTE: I, Delmer Fortune, am scribing for, and in the presence of,  Piyush Neil MD.     History     Chief Complaint   Patient presents with    General Illness     Chills, body aches, N/V/D x 4 days. Sent from [place] for further eval and work up     Time seen by provider: 8:08 PM on 11/22/2022    Syeda Gonsalves is a 71 y.o. female who presents to the ED with vomiting and body aches. The patient reports experiencing a burning sensation in her throat to stomach 4 days ago. She notes having several episodes of vomiting and a sore throat as well as black diarrhea. The patient states that she has not taken any medication to cause the dark stool but notes that she took a Pepto Bismol 3 days ago. She, however, states that she experienced the dark stool before taking the Pepto. The patient mentions having difficulty urinating and has been drinking water in an attempt to alleviate the symptom. She denies any sick contacts, recent travels, or eating any different foods outside of what she normally eats. The patient also reports currently feeling  bloated as well as experiencing some belching. The patient denies abdominal pain, dysuria, syncope, or any other symptoms at this time. PMHx of HLD, smoking, gastritis, GERD, uterine cancer, cervical cancer, and kidney insufficiency. PSHx of hysterectomy, cholecystectomy, colonoscopy, adrenalectomy, and esophagogastroduodenoscopy.    The history is provided by the patient.   Review of patient's allergies indicates:   Allergen Reactions    Flu vaccine 2011 (36 mos+)(pf) Anaphylaxis    Iodine and iodide containing products     Lipitor [atorvastatin]     Niacin preparations     Statins-hmg-coa reductase inhibitors Hives    Welchol [colesevelam] Other (See Comments)     GI upset and insomnia    Zetia [ezetimibe] Nausea And Vomiting     Past Medical History:   Diagnosis Date    Abnormal Pap smear 1990    Allergy to statin medication      Anxiety     Arthritis     Asthma     Back pain     Cervical cancer     Depression     GERD (gastroesophageal reflux disease)     H/O head and neck radiation 2017    Hyperlipidemia     Kidney insufficiency     Multinodular goiter 2017    Obesity     Smoking 2017    Uterine cancer      Past Surgical History:   Procedure Laterality Date    ADENOIDECTOMY      ADRENALECTOMY      CARPAL TUNNEL RELEASE      CHOLECYSTECTOMY      COLONOSCOPY N/A 2017    Procedure: COLONOSCOPY;  Surgeon: Harriett Pereyra MD;  Location: [facility] ENDO;  Service: Endoscopy;  Laterality: N/A;    ESOPHAGOGASTRODUODENOSCOPY N/A 2018    Procedure: EGD (ESOPHAGOGASTRODUODENOSCOPY);  Surgeon: Harriett Pereyra MD;  Location: [facility] ENDO;  Service: Endoscopy;  Laterality: N/A;    EYE SURGERY      HYSTERECTOMY      OOPHORECTOMY      PERCUTANEOUS TENOTOMY Right 2022    Procedure: TENOTOMY, PERCUTANEOUS;  Surgeon: Doris Galan DO;  Location: [facility] OR;  Service: Orthopedics;  Laterality: Right;    TONSILLECTOMY       Family History   Problem Relation Age of Onset    Heart disease Mother         MI    Heart disease Father         MI    Hyperlipidemia Brother     ANTHONY disease Daughter     Diabetes Son     ANTHONY disease Maternal Grandmother     Bladder Cancer Paternal Grandmother     Heart disease Paternal Grandfather         MI    Breast cancer Neg Hx     Colon cancer Neg Hx     Ovarian cancer Neg Hx     Cancer Neg Hx      Social History     Tobacco Use    Smoking status: Some Days     Packs/day: 0.25     Years: 20.00     Pack years: 5.00     Types: Cigarettes     Last attempt to quit: 2017     Years since quittin.9    Smokeless tobacco: Never   Substance Use Topics    Alcohol use: No    Drug use: No     Review of Systems   Constitutional:  Negative for fever.   HENT:  Positive for sore throat. Negative for congestion.    Respiratory:  Negative for cough and shortness of breath.    Cardiovascular:  Negative for  chest pain.   Gastrointestinal:  Positive for diarrhea, nausea and vomiting. Negative for abdominal pain.   Genitourinary:  Positive for difficulty urinating. Negative for dysuria.   Musculoskeletal:  Positive for myalgias. Negative for back pain.   Skin:  Negative for rash.   Neurological:  Negative for syncope and weakness.   Hematological:  Does not bruise/bleed easily.     Physical Exam     Initial Vitals [11/22/22 1959]   BP Pulse Resp Temp SpO2   (!) 161/72 60 18 96.5 °F (35.8 °C) 96 %      MAP       --         Physical Exam    Nursing note and vitals reviewed.  Constitutional: She appears well-developed and well-nourished. She is not diaphoretic. No distress.   HENT:   Head: Normocephalic and atraumatic.   Mouth/Throat: Oropharynx is clear and moist.   Eyes: Conjunctivae are normal.   Neck: Neck supple.   Cardiovascular:  Normal rate, regular rhythm, normal heart sounds and intact distal pulses.     Exam reveals no gallop and no friction rub.       No murmur heard.  Pulmonary/Chest: Breath sounds normal. She has no wheezes. She has no rhonchi. She has no rales.   Abdominal: Abdomen is soft. She exhibits no distension. There is no abdominal tenderness.   Musculoskeletal:         General: Normal range of motion.      Cervical back: Neck supple.     Neurological: She is alert and oriented to person, place, and time.   Skin: No rash noted. No erythema.   Psychiatric: Her speech is normal.       ED Course   Procedures  Labs Reviewed   CBC W/ AUTO DIFFERENTIAL - Abnormal; Notable for the following components:       Result Value    Lymph % 16.6 (*)     All other components within normal limits   COMPREHENSIVE METABOLIC PANEL - Abnormal; Notable for the following components:    Potassium 3.1 (*)     All other components within normal limits   LIPASE   TROPONIN I          Imaging Results    None          Medications   sodium chloride 0.9% bolus 1,000 mL (0 mLs Intravenous Stopped 11/22/22 2144)   ondansetron injection  4 mg (4 mg Intravenous Given 11/22/22 2028)   aluminum-magnesium hydroxide-simethicone 200-200-20 mg/5 mL suspension 30 mL (30 mLs Oral Given 11/22/22 2143)     And   LIDOcaine HCl 2% oral solution 15 mL (15 mLs Oral Given 11/22/22 2143)   potassium bicarbonate disintegrating tablet 50 mEq (50 mEq Oral Given 11/22/22 2205)   magnesium oxide tablet 400 mg (400 mg Oral Given 11/22/22 2204)     Medical Decision Making:   History:   Old Medical Records: I decided to obtain old medical records.  Independently Interpreted Test(s):   I have ordered and independently interpreted EKG Reading(s) - see prior notes  Clinical Tests:   Lab Tests: Ordered and Reviewed  Medical Tests: Ordered and Reviewed        Scribe Attestation:   Scribe #1: I performed the above scribed service and the documentation accurately describes the services I performed. I attest to the accuracy of the note.      ED Course as of 11/22/22 2208 Tue Nov 22, 2022 2020 RECTAL:  Brown stool, guaiac-negative.  Femal staff chaperone Lidia ZAZUETA present for entirety of exam.   [MR]   2029 EKG:  Sinus bradycardia, rate of 55, normal intervals, L axis.  There are no acute ST or T wave changes suggestive of acute ischemia or infarction.   [MR]      ED Course User Index  [MR] Piyush Neil MD               I, Dr. Piyush Neil, personally performed the services described in this documentation. All medical record entries made by the scribe were at my direction and in my presence.  I have reviewed the chart and agree that the record reflects my personal performance and is accurate and complete. Piyush Neil MD.  9:41 PM 11/22/2022    Syeda Gonsalves is a 71 y.o. female presenting with vomiting with burning chest sensation highly suggestive of esophagitis secondary to vomiting. EKG was reviewed with no sign of acute ischemia or infarction.  I doubt ACS.  I do not think further troponin enzyme assay or provocative testing is indicated.  I have  very low suspicion for alternative life-threatening intrathoracic process such as esophageal perforation.  She is no abdominal pain or tenderness on exam and I have very low suspicion for life-threatening intra-abdominal process such as obstruction, volvulus, bowel perforation, cholecystitis, appendicitis.  I do not think further abdominal imaging or surgical consultation is indicated.  Symptomatic treatment as necessary initiated here with IV fluids and antiemetics.  She does feel improved.  Dark stools noted by patient possibly related to earlier Pepto-Bismol with stools notably guaiac-negative here without significant anemia.  I doubt life-threatening GI bleed.  Patient feels improved after antacids antiemetics here.  She will be discharged with oral rehydration instructions and antiemetic as necessary.  Oral potassium repletion given here prior to discharge.  Follow-up with PCP.  Detailed return precautions reviewed.    Clinical Impression:   Final diagnoses:  [R11.10] Vomiting  [R19.7] Diarrhea, unspecified type (Primary)      ED Disposition Condition    Discharge Stable          ED Prescriptions       Medication Sig Dispense Start Date End Date Auth. Provider    ondansetron (ZOFRAN-ODT) 4 MG TbDL Take 1 tablet (4 mg total) by mouth every 8 (eight) hours as needed (nausea, vomiting). 12 tablet 11/22/2022 -- Piyush Neil MD          Follow-up Information       Follow up With Specialties Details Why Contact Info    Surinder Saldaña MD Family Medicine In 1 week  [address] [facility] [id number]  [phone]               Piyush Neil MD  11/22/22 5896

## 2022-11-23 NOTE — TELEPHONE ENCOUNTER
Called and spoke with pt. Informed pt that the appt she has on 12/9 is our soonest available appt. Pt states she needs FMLA paperwork filled out for her medical leave. Informed pt that the physician that put her on medical leave should fill out the paperwork. Pt states shes the one that put herself on medical leave, not a doctor. Informed pt that she would need to see a provider and the soonest available is the appt she has scheduled. Pt states ill just keep that appt.

## 2022-11-25 ENCOUNTER — TELEPHONE (OUTPATIENT)
Dept: GASTROENTEROLOGY | Facility: CLINIC | Age: 72
End: 2022-11-25
Payer: MEDICARE

## 2022-11-25 NOTE — TELEPHONE ENCOUNTER
----- Message from Benoit Jimenez sent at 11/25/2022 12:07 PM CST -----  Type:  Sooner Appointment Request    Caller is requesting a sooner appointment.  Caller declined first available appointment listed below.  Caller will not accept being placed on the waitlist and is requesting a message be sent to doctor.    Name of Caller:  pt  When is the first available appointment?  none  Symptoms:  gird/reflux--please call and advise  Best Call Back Number:  [phone] (home)     Additional Information:  thank you

## 2022-12-09 ENCOUNTER — OFFICE VISIT (OUTPATIENT)
Dept: FAMILY MEDICINE | Facility: CLINIC | Age: 72
End: 2022-12-09
Payer: MEDICARE

## 2022-12-09 ENCOUNTER — OFFICE VISIT (OUTPATIENT)
Dept: GASTROENTEROLOGY | Facility: CLINIC | Age: 72
End: 2022-12-09
Payer: MEDICARE

## 2022-12-09 VITALS
DIASTOLIC BLOOD PRESSURE: 70 MMHG | WEIGHT: 180.31 LBS | HEIGHT: 62 IN | HEART RATE: 60 BPM | SYSTOLIC BLOOD PRESSURE: 114 MMHG | BODY MASS INDEX: 33.18 KG/M2

## 2022-12-09 VITALS
SYSTOLIC BLOOD PRESSURE: 100 MMHG | HEIGHT: 62 IN | DIASTOLIC BLOOD PRESSURE: 64 MMHG | WEIGHT: 180.56 LBS | HEART RATE: 66 BPM | OXYGEN SATURATION: 97 % | BODY MASS INDEX: 33.23 KG/M2 | TEMPERATURE: 98 F

## 2022-12-09 DIAGNOSIS — R10.9 STOMACH PAIN: Primary | ICD-10-CM

## 2022-12-09 DIAGNOSIS — E87.6 HYPOKALEMIA: ICD-10-CM

## 2022-12-09 DIAGNOSIS — R12 HEARTBURN: ICD-10-CM

## 2022-12-09 DIAGNOSIS — K21.9 GASTROESOPHAGEAL REFLUX DISEASE, UNSPECIFIED WHETHER ESOPHAGITIS PRESENT: Primary | ICD-10-CM

## 2022-12-09 DIAGNOSIS — R14.2 BELCHING: ICD-10-CM

## 2022-12-09 DIAGNOSIS — R14.0 BLOATING: ICD-10-CM

## 2022-12-09 DIAGNOSIS — R13.10 PILL DYSPHAGIA: ICD-10-CM

## 2022-12-09 DIAGNOSIS — Z12.31 BREAST CANCER SCREENING BY MAMMOGRAM: ICD-10-CM

## 2022-12-09 DIAGNOSIS — Z02.89 ENCOUNTER FOR COMPLETION OF FORM WITH PATIENT: ICD-10-CM

## 2022-12-09 DIAGNOSIS — Z87.898 HISTORY OF NAUSEA AND VOMITING: ICD-10-CM

## 2022-12-09 DIAGNOSIS — Z87.898 HISTORY OF CHEST PAIN: ICD-10-CM

## 2022-12-09 PROCEDURE — 3074F PR MOST RECENT SYSTOLIC BLOOD PRESSURE < 130 MM HG: ICD-10-PCS | Mod: CPTII,S$GLB,,

## 2022-12-09 PROCEDURE — 1159F MED LIST DOCD IN RCRD: CPT | Mod: CPTII,S$GLB,,

## 2022-12-09 PROCEDURE — 3288F PR FALLS RISK ASSESSMENT DOCUMENTED: ICD-10-PCS | Mod: CPTII,S$GLB,,

## 2022-12-09 PROCEDURE — 1160F PR REVIEW ALL MEDS BY PRESCRIBER/CLIN PHARMACIST DOCUMENTED: ICD-10-PCS | Mod: CPTII,S$GLB,, | Performed by: NURSE PRACTITIONER

## 2022-12-09 PROCEDURE — 1160F PR REVIEW ALL MEDS BY PRESCRIBER/CLIN PHARMACIST DOCUMENTED: ICD-10-PCS | Mod: CPTII,S$GLB,,

## 2022-12-09 PROCEDURE — 1157F PR ADVANCE CARE PLAN OR EQUIV PRESENT IN MEDICAL RECORD: ICD-10-PCS | Mod: CPTII,S$GLB,,

## 2022-12-09 PROCEDURE — 1157F PR ADVANCE CARE PLAN OR EQUIV PRESENT IN MEDICAL RECORD: ICD-10-PCS | Mod: CPTII,S$GLB,, | Performed by: NURSE PRACTITIONER

## 2022-12-09 PROCEDURE — 1101F PR PT FALLS ASSESS DOC 0-1 FALLS W/OUT INJ PAST YR: ICD-10-PCS | Mod: CPTII,S$GLB,,

## 2022-12-09 PROCEDURE — 99203 OFFICE O/P NEW LOW 30 MIN: CPT | Mod: S$GLB,,,

## 2022-12-09 PROCEDURE — 3008F PR BODY MASS INDEX (BMI) DOCUMENTED: ICD-10-PCS | Mod: CPTII,S$GLB,,

## 2022-12-09 PROCEDURE — 99999 PR PBB SHADOW E&M-EST. PATIENT-LVL IV: ICD-10-PCS | Mod: PBBFAC,,,

## 2022-12-09 PROCEDURE — 99999 PR PBB SHADOW E&M-EST. PATIENT-LVL V: ICD-10-PCS | Mod: PBBFAC,,, | Performed by: NURSE PRACTITIONER

## 2022-12-09 PROCEDURE — 3074F SYST BP LT 130 MM HG: CPT | Mod: CPTII,S$GLB,,

## 2022-12-09 PROCEDURE — 3008F BODY MASS INDEX DOCD: CPT | Mod: CPTII,S$GLB,, | Performed by: NURSE PRACTITIONER

## 2022-12-09 PROCEDURE — 3008F BODY MASS INDEX DOCD: CPT | Mod: CPTII,S$GLB,,

## 2022-12-09 PROCEDURE — 3078F PR MOST RECENT DIASTOLIC BLOOD PRESSURE < 80 MM HG: ICD-10-PCS | Mod: CPTII,S$GLB,,

## 2022-12-09 PROCEDURE — 99213 PR OFFICE/OUTPT VISIT, EST, LEVL III, 20-29 MIN: ICD-10-PCS | Mod: S$GLB,,, | Performed by: NURSE PRACTITIONER

## 2022-12-09 PROCEDURE — 3288F FALL RISK ASSESSMENT DOCD: CPT | Mod: CPTII,S$GLB,, | Performed by: NURSE PRACTITIONER

## 2022-12-09 PROCEDURE — 3074F PR MOST RECENT SYSTOLIC BLOOD PRESSURE < 130 MM HG: ICD-10-PCS | Mod: CPTII,S$GLB,, | Performed by: NURSE PRACTITIONER

## 2022-12-09 PROCEDURE — 3074F SYST BP LT 130 MM HG: CPT | Mod: CPTII,S$GLB,, | Performed by: NURSE PRACTITIONER

## 2022-12-09 PROCEDURE — 3008F PR BODY MASS INDEX (BMI) DOCUMENTED: ICD-10-PCS | Mod: CPTII,S$GLB,, | Performed by: NURSE PRACTITIONER

## 2022-12-09 PROCEDURE — 3078F DIAST BP <80 MM HG: CPT | Mod: CPTII,S$GLB,, | Performed by: NURSE PRACTITIONER

## 2022-12-09 PROCEDURE — 99203 PR OFFICE/OUTPT VISIT, NEW, LEVL III, 30-44 MIN: ICD-10-PCS | Mod: S$GLB,,,

## 2022-12-09 PROCEDURE — 99213 OFFICE O/P EST LOW 20 MIN: CPT | Mod: S$GLB,,, | Performed by: NURSE PRACTITIONER

## 2022-12-09 PROCEDURE — 99999 PR PBB SHADOW E&M-EST. PATIENT-LVL IV: CPT | Mod: PBBFAC,,,

## 2022-12-09 PROCEDURE — 99999 PR PBB SHADOW E&M-EST. PATIENT-LVL V: CPT | Mod: PBBFAC,,, | Performed by: NURSE PRACTITIONER

## 2022-12-09 PROCEDURE — 1157F ADVNC CARE PLAN IN RCRD: CPT | Mod: CPTII,S$GLB,,

## 2022-12-09 PROCEDURE — 1101F PR PT FALLS ASSESS DOC 0-1 FALLS W/OUT INJ PAST YR: ICD-10-PCS | Mod: CPTII,S$GLB,, | Performed by: NURSE PRACTITIONER

## 2022-12-09 PROCEDURE — 1101F PT FALLS ASSESS-DOCD LE1/YR: CPT | Mod: CPTII,S$GLB,,

## 2022-12-09 PROCEDURE — 1160F RVW MEDS BY RX/DR IN RCRD: CPT | Mod: CPTII,S$GLB,, | Performed by: NURSE PRACTITIONER

## 2022-12-09 PROCEDURE — 1101F PT FALLS ASSESS-DOCD LE1/YR: CPT | Mod: CPTII,S$GLB,, | Performed by: NURSE PRACTITIONER

## 2022-12-09 PROCEDURE — 1159F MED LIST DOCD IN RCRD: CPT | Mod: CPTII,S$GLB,, | Performed by: NURSE PRACTITIONER

## 2022-12-09 PROCEDURE — 1160F RVW MEDS BY RX/DR IN RCRD: CPT | Mod: CPTII,S$GLB,,

## 2022-12-09 PROCEDURE — 1159F PR MEDICATION LIST DOCUMENTED IN MEDICAL RECORD: ICD-10-PCS | Mod: CPTII,S$GLB,,

## 2022-12-09 PROCEDURE — 1126F PR PAIN SEVERITY QUANTIFIED, NO PAIN PRESENT: ICD-10-PCS | Mod: CPTII,S$GLB,, | Performed by: NURSE PRACTITIONER

## 2022-12-09 PROCEDURE — 3288F FALL RISK ASSESSMENT DOCD: CPT | Mod: CPTII,S$GLB,,

## 2022-12-09 PROCEDURE — 1126F PR PAIN SEVERITY QUANTIFIED, NO PAIN PRESENT: ICD-10-PCS | Mod: CPTII,S$GLB,,

## 2022-12-09 PROCEDURE — 3078F DIAST BP <80 MM HG: CPT | Mod: CPTII,S$GLB,,

## 2022-12-09 PROCEDURE — 1159F PR MEDICATION LIST DOCUMENTED IN MEDICAL RECORD: ICD-10-PCS | Mod: CPTII,S$GLB,, | Performed by: NURSE PRACTITIONER

## 2022-12-09 PROCEDURE — 3078F PR MOST RECENT DIASTOLIC BLOOD PRESSURE < 80 MM HG: ICD-10-PCS | Mod: CPTII,S$GLB,, | Performed by: NURSE PRACTITIONER

## 2022-12-09 PROCEDURE — 1126F AMNT PAIN NOTED NONE PRSNT: CPT | Mod: CPTII,S$GLB,,

## 2022-12-09 PROCEDURE — 3288F PR FALLS RISK ASSESSMENT DOCUMENTED: ICD-10-PCS | Mod: CPTII,S$GLB,, | Performed by: NURSE PRACTITIONER

## 2022-12-09 PROCEDURE — 1157F ADVNC CARE PLAN IN RCRD: CPT | Mod: CPTII,S$GLB,, | Performed by: NURSE PRACTITIONER

## 2022-12-09 PROCEDURE — 1126F AMNT PAIN NOTED NONE PRSNT: CPT | Mod: CPTII,S$GLB,, | Performed by: NURSE PRACTITIONER

## 2022-12-09 RX ORDER — PANTOPRAZOLE SODIUM 40 MG/1
40 TABLET, DELAYED RELEASE ORAL DAILY
Qty: 30 TABLET | Refills: 0 | Status: SHIPPED | OUTPATIENT
Start: 2022-12-09 | End: 2023-04-25

## 2022-12-09 NOTE — PROGRESS NOTES
"Subjective:       Patient ID: Syeda Gonsalves is a 71 y.o. female Body mass index is 32.98 kg/m².    Chief Complaint: Gastroesophageal Reflux    This patient is new to me.  Established patient of Dr. Pereyra.     Reviewed hospital ER report from 11/22/22,  "ED Course as of 11/22/22 2208 Tue Nov 22, 2022 2020 RECTAL:  Brown stool, guaiac-negative.  Femal staff chaperone Lidia ZAZUETA present for entirety of exam.   [MR]  2029 EKG:  Sinus bradycardia, rate of 55, normal intervals, L axis.  There are no acute ST or T wave changes suggestive of acute ischemia or infarction.   [MR]     ED Course User Index [MR] Piyush Neil MD    Syeda Gonsalves is a 71 y.o. female presenting with vomiting with burning chest sensation highly suggestive of esophagitis secondary to vomiting. EKG was reviewed with no sign of acute ischemia or infarction.  I doubt ACS.  I do not think further troponin enzyme assay or provocative testing is indicated.  I have very low suspicion for alternative life-threatening intrathoracic process such as esophageal perforation.  She is no abdominal pain or tenderness on exam and I have very low suspicion for life-threatening intra-abdominal process such as obstruction, volvulus, bowel perforation, cholecystitis, appendicitis.  I do not think further abdominal imaging or surgical consultation is indicated.  Symptomatic treatment as necessary initiated here with IV fluids and antiemetics.  She does feel improved.  Dark stools noted by patient possibly related to earlier Pepto-Bismol with stools notably guaiac-negative here without significant anemia.  I doubt life-threatening GI bleed.  Patient feels improved after antacids antiemetics here.  She will be discharged with oral rehydration instructions and antiemetic as necessary.  Oral potassium repletion given here prior to discharge.  Follow-up with PCP.  Detailed return precautions reviewed."    GI Problem  The primary symptoms include " "abdominal pain (reports hx of epigastric pain that radiated into chest with associated vomitting; denies currently), nausea (resolved; hx of chronic nausea), vomiting (resolved), diarrhea (resolved; currently having 2 BMs daily rated stool 2-3 on bristol scale) and melena (resolved; occurred prior to ER visit around the time she took pepto bismol; 11/22/22 guaiac-negative). Primary symptoms do not include fever, weight loss, fatigue, hematemesis, jaundice, hematochezia or dysuria.   The vomiting began more than 2 days ago (started 11/18/22). Frequency: reports vomiting 4-5 times. The emesis contains bilious material (orange colored; denies bloody or coffee grind emesis). Risk factors: denies suspect food or traveling.   The illness is also significant for dysphagia (occurs when swallowing pills; drinks lots of water to get pills down) and bloating (occasional). The illness does not include chills (resolved), anorexia, odynophagia or constipation. Associated symptoms comments: EGD 07/18/18 - "Z-line irregular. Gastritis. Biopsied. Erythematous duodenopathy. History of nausea, abdominal pain, bloating and increased flatulence, mild gastritis and duodenitis seen today"; patho: benign and did not show H.pylori. Significant associated medical issues include GERD (regurgitation occurs after eating certain foods (bananas & citrus); heartburn occurs a few times a week; belching; takes antacid PRN  with improvement). Associated medical issues do not include inflammatory bowel disease, gallstones, liver disease, alcohol abuse, PUD, gastric bypass, bowel resection, irritable bowel syndrome, hemorrhoids or diverticulitis. Associated medical issues comments: hx of hysterectomy.     Review of Systems   Constitutional:  Negative for activity change, appetite change, chills (resolved), diaphoresis, fatigue, fever, unexpected weight change and weight loss.   HENT:  Positive for trouble swallowing. Negative for sore throat.  "   Respiratory:  Negative for cough, choking and shortness of breath.    Cardiovascular:  Positive for chest pain (denies currently).   Gastrointestinal:  Positive for abdominal pain (reports hx of epigastric pain that radiated into chest with associated vomitting; denies currently), bloating (occasional), diarrhea (resolved; currently having 2 BMs daily rated stool 2-3 on bristol scale), dysphagia (occurs when swallowing pills; drinks lots of water to get pills down), melena (resolved; occurred prior to ER visit around the time she took pepto bismol; 11/22/22 guaiac-negative), nausea (resolved; hx of chronic nausea) and vomiting (resolved). Negative for abdominal distention, anal bleeding, anorexia, blood in stool, constipation, hematemesis, hematochezia, jaundice and rectal pain.   Genitourinary:  Negative for dysuria.       Patient's last menstrual period was 09/25/1990.  Past Medical History:   Diagnosis Date    Abnormal Pap smear 1990    Allergy to statin medication     Anxiety     Arthritis     Asthma     Back pain     Cervical cancer     Depression     GERD (gastroesophageal reflux disease)     H/O head and neck radiation 1/11/2017    Hyperlipidemia     Kidney insufficiency     Multinodular goiter 1/11/2017    Obesity     Smoking 1/11/2017    Uterine cancer      Past Surgical History:   Procedure Laterality Date    ADENOIDECTOMY      ADRENALECTOMY      CARPAL TUNNEL RELEASE      CHOLECYSTECTOMY      COLONOSCOPY N/A 12/21/2017    Procedure: COLONOSCOPY;  Surgeon: Harriett Pereyra MD;  Location: [facility];  Service: Endoscopy;  Laterality: N/A;    ESOPHAGOGASTRODUODENOSCOPY N/A 07/18/2018    Procedure: EGD (ESOPHAGOGASTRODUODENOSCOPY);  Surgeon: Harriett Pereyra MD;  Location: [facility];  Service: Endoscopy;  Laterality: N/A;    EYE SURGERY      HYSTERECTOMY      OOPHORECTOMY      PERCUTANEOUS TENOTOMY Right 07/27/2022    Procedure: TENOTOMY, PERCUTANEOUS;  Surgeon: Doris Galan DO;  [facility]  OR;  Service: Orthopedics;  Laterality: Right;    TONSILLECTOMY      UPPER GASTROINTESTINAL ENDOSCOPY       Family History   Problem Relation Age of Onset    Heart disease Mother         MI    Heart disease Father         MI    Hyperlipidemia Brother     ANTHONY disease Daughter     Diabetes Son     ANTHONY disease Maternal Grandmother     Bladder Cancer Paternal Grandmother     Heart disease Paternal Grandfather         MI    Breast cancer Neg Hx     Colon cancer Neg Hx     Ovarian cancer Neg Hx     Cancer Neg Hx      Social History     Tobacco Use    Smoking status: Some Days     Packs/day: 0.25     Years: 20.00     Pack years: 5.00     Types: Cigarettes     Last attempt to quit: 2017     Years since quittin.9    Smokeless tobacco: Never   Substance Use Topics    Alcohol use: No    Drug use: No     Wt Readings from Last 10 Encounters:   22 81.8 kg (180 lb 5.4 oz)   22 81.9 kg (180 lb 8.9 oz)   22 81.2 kg (179 lb)   22 81.6 kg (179 lb 12.8 oz)   22 83.5 kg (184 lb)   22 82.4 kg (181 lb 10.5 oz)   22 82.4 kg (181 lb 10.5 oz)   22 83 kg (183 lb)   22 83 kg (183 lb)   22 83 kg (183 lb)     Lab Results   Component Value Date    WBC 7.31 2022    HGB 15.2 2022    HCT 45.3 2022    MCV 85 2022     2022     CMP  Sodium   Date Value Ref Range Status   2022 139 136 - 145 mmol/L Final     Potassium   Date Value Ref Range Status   2022 3.1 (L) 3.5 - 5.1 mmol/L Final     Chloride   Date Value Ref Range Status   2022 102 95 - 110 mmol/L Final     CO2   Date Value Ref Range Status   2022 27 23 - 29 mmol/L Final     Glucose   Date Value Ref Range Status   2022 96 70 - 110 mg/dL Final     BUN   Date Value Ref Range Status   2022 15 8 - 23 mg/dL Final     Creatinine   Date Value Ref Range Status   2022 0.8 0.5 - 1.4 mg/dL Final     Calcium   Date Value Ref Range Status   2022 8.8 8.7 -  10.5 mg/dL Final     Total Protein   Date Value Ref Range Status   11/22/2022 7.0 6.0 - 8.4 g/dL Final     Albumin   Date Value Ref Range Status   11/22/2022 3.5 3.5 - 5.2 g/dL Final     Total Bilirubin   Date Value Ref Range Status   11/22/2022 0.6 0.1 - 1.0 mg/dL Final     Comment:     For infants and newborns, interpretation of results should be based  on gestational age, weight and in agreement with clinical  observations.    Premature Infant recommended reference ranges:  Up to 24 hours.............<8.0 mg/dL  Up to 48 hours............<12.0 mg/dL  3-5 days..................<15.0 mg/dL  6-29 days.................<15.0 mg/dL       Alkaline Phosphatase   Date Value Ref Range Status   11/22/2022 62 55 - 135 U/L Final     AST   Date Value Ref Range Status   11/22/2022 34 10 - 40 U/L Final     ALT   Date Value Ref Range Status   11/22/2022 20 10 - 44 U/L Final     Anion Gap   Date Value Ref Range Status   11/22/2022 10 8 - 16 mmol/L Final     eGFR if    Date Value Ref Range Status   07/25/2022 >60.0 >60 mL/min/1.73 m^2 Final     eGFR if non    Date Value Ref Range Status   07/25/2022 >60.0 >60 mL/min/1.73 m^2 Final     Comment:     Calculation used to obtain the estimated glomerular filtration  rate (eGFR) is the CKD-EPI equation.        Lab Results   Component Value Date    LIPASE 37 11/22/2022     Lab Results   Component Value Date    TSH 1.557 12/17/2021       Reviewed prior medical records including radiology report of abdominal US 06/11/19 & endoscopy history (see surgical history).    Objective:      Physical Exam  Vitals and nursing note reviewed.   Constitutional:       General: She is not in acute distress.     Appearance: Normal appearance. She is obese. She is not ill-appearing.   HENT:      Mouth/Throat:      Lips: Pink. No lesions.   Cardiovascular:      Rate and Rhythm: Normal rate and regular rhythm.      Pulses: Normal pulses.      Heart sounds: Normal heart sounds.    Pulmonary:      Effort: Pulmonary effort is normal. No respiratory distress.      Breath sounds: Normal breath sounds.   Abdominal:      General: Abdomen is protuberant. Bowel sounds are normal. There is no distension or abdominal bruit. There are no signs of injury.      Palpations: Abdomen is soft. There is no shifting dullness, fluid wave, hepatomegaly, splenomegaly or mass.      Tenderness: There is no abdominal tenderness. There is no guarding or rebound. Negative signs include Nugent's sign, Rovsing's sign and McBurney's sign.      Hernia: No hernia is present.   Skin:     General: Skin is warm and dry.      Coloration: Skin is not jaundiced or pale.   Neurological:      Mental Status: She is alert and oriented to person, place, and time.   Psychiatric:         Attention and Perception: Attention normal.         Mood and Affect: Mood normal.         Speech: Speech normal.         Behavior: Behavior normal.       Assessment:       1. Gastroesophageal reflux disease, unspecified whether esophagitis present    2. Heartburn    3. Pill dysphagia    4. Belching    5. Bloating    6. History of nausea and vomiting    7. History of chest pain        Plan:       Gastroesophageal reflux disease, unspecified whether esophagitis present & Heartburn  - schedule EGD, discussed procedure with patient, including risks and benefits, patient verbalized understanding  -discussed about the different types of medications used to treat reflux and how to use them, antacids can be used PRN for breakthrough heartburn symptoms by reducing stomach acid that is already produced, H2 blockers work by limiting the amount acid production, & PPI's work to block acid production and are taken daily, patient verbalized understanding.  -Educated patient on lifestyle modifications to help control/reduce reflux/abdominal pain including: avoid large meals, avoid eating within 2-3 hours of bedtime (avoid late night eating & lying down soon after  eating), elevate head of bed if nocturnal symptoms are present, smoking cessation (if current smoker), & weight loss (if overweight).   -Educated to avoid known foods which trigger reflux symptoms & to minimize/avoid high-fat foods, chocolate, caffeine, citrus, alcohol, & tomato products.  -Advised to avoid/limit use of NSAID's, since they can cause GI upset, bleeding, and/or ulcers. If needed, take with food.   -START: pantoprazole (PROTONIX) 40 MG tablet; Take 1 tablet (40 mg total) by mouth once daily.  Dispense: 30 tablet; Refill: 0  -     Case Request Endoscopy: EGD (ESOPHAGOGASTRODUODENOSCOPY)    Pill dysphagia  - schedule EGD, discussed procedure with patient and possible esophageal dilation may be performed during procedure if indicated, patient verbalized understanding  - educated patient to eat smaller more frequent meals and to eat slowly and advised to eat a soft diet.  - possible UGI/esophagram/esophageal manometry if symptoms persist  -     Case Request Endoscopy: EGD (ESOPHAGOGASTRODUODENOSCOPY)    Belching  - schedule EGD, discussed procedure with patient, including risks and benefits, patient verbalized understanding  -     Case Request Endoscopy: EGD (ESOPHAGOGASTRODUODENOSCOPY)    Bloating  - schedule EGD, discussed procedure with patient, including risks and benefits, patient verbalized understanding  -testing for H. Pylori typically performed during EGD (if not can do lab testing), patient verbalized understanding and agreed with management plan\  -     Case Request Endoscopy: EGD (ESOPHAGOGASTRODUODENOSCOPY)    History of nausea and vomiting  - schedule EGD, discussed procedure with patient, including risks and benefits, patient verbalized understanding  -     Case Request Endoscopy: EGD (ESOPHAGOGASTRODUODENOSCOPY)    History of chest pain  - schedule EGD, discussed procedure with patient, including risks and benefits, patient verbalized understanding  -Follow-up with PCP and/or cardiology or  go to ER if pain returns or worsens  -     Case Request Endoscopy: EGD (ESOPHAGOGASTRODUODENOSCOPY)    Follow up in about 4 weeks (around 1/6/2023), or if symptoms worsen or fail to improve.      If no improvement in symptoms or symptoms worsen, call/follow-up at clinic or go to ER.        30 minutes of total time spent on the encounter, which includes face to face time and non-face to face time preparing to see the patient (eg, review of tests), Obtaining and/or reviewing separately obtained history, Documenting clinical information in the electronic or other health record, Independently interpreting results (not separately reported) and communicating results to the patient/family/caregiver, or Care coordination (not separately reported).

## 2022-12-09 NOTE — PROGRESS NOTES
Subjective:       Patient ID: Syeda Gonsalves is a 71 y.o. female.    Chief Complaint: completion of paperwork for work and ER follow up     HPI   70 y/o female patient with medical problems listed below presents for ED follow up and for a form completion to return to work. Patient presented to ED for vomiting, stomach pain, body ache, diarrhea on 11/22. Patient states symptoms started on 11/18. Denies recent travel or sick contacts. Patient took a Pepto Bismol 3 days before ED visit. Patient noticed the dark stool before taking the Pepto. Patient was treated with IV fluids and antiemetics, and discharged with zofran. Today patient states symptoms felt much improved and ready to return to work. Denies sore throat, cough, chest pain, sob, abdominal pain, urinary or dark stools, diarrhea, blood in stool, generalized body ache or weakness. Patient states she has appt of GI today after clinic visit.     Patient works in [employer] and brought in a form to return to work.     Labs reviewed from 11/2022- K 3.1    Patient Active Problem List   Diagnosis    History of cervical cancer    BMI 34.0-34.9,adult    Unspecified pruritic disorder    Irritable bowel syndrome    Other and unspecified hyperlipidemia    Multinodular goiter    H/O head and neck radiation    Colon polyp    FH: premature coronary heart disease    Major depressive disorder in partial remission    History of influenza vaccine allergy    Purpura    Allergy to statin medication    Hip pain        Review of patient's allergies indicates:   Allergen Reactions    Flu vaccine 2011 (36 mos+)(pf) Anaphylaxis    Iodine and iodide containing products     Lipitor [atorvastatin]     Niacin preparations     Statins-hmg-coa reductase inhibitors Hives    Welchol [colesevelam] Other (See Comments)     GI upset and insomnia    Zetia [ezetimibe] Nausea And Vomiting       Past Surgical History:   Procedure Laterality Date    ADENOIDECTOMY      ADRENALECTOMY      CARPAL TUNNEL  RELEASE      CHOLECYSTECTOMY      COLONOSCOPY N/A 12/21/2017    Procedure: COLONOSCOPY;  Surgeon: Harriett Pereyra MD;  Location: [facility] END[facility];  Service: Endoscopy;  Laterality: N/A;    ESOPHAGOGASTRODUODENOSCOPY N/A 7/18/2018    Procedure: EGD (ESOPHAGOGASTRODUODENOSCOPY);  Surgeon: Harriett Pereyra MD;  Location: [facility] ENDO;  Service: Endoscopy;  Laterality: N/A;    EYE SURGERY      HYSTERECTOMY      OOPHORECTOMY      PERCUTANEOUS TENOTOMY Right 7/27/2022    Procedure: TENOTOMY, PERCUTANEOUS;  Surgeon: Doris Galan DO;  Location: [facility] OR;  Service: Orthopedics;  Laterality: Right;    TONSILLECTOMY            Current Outpatient Medications:     clindamycin phosphate 1% (CLINDAGEL) 1 % gel, Apply topically 2 (two) times daily., Disp: 60 g, Rfl: 3    ergocalciferol (ERGOCALCIFEROL) 50,000 unit Cap, Take 1 capsule (50,000 Units total) by mouth every 7 days., Disp: 12 capsule, Rfl: 3    fluocinolone acetonide oiL 0.01 % Drop, 3-4 drops to the affected ear(s) twice daily no more than a week at a time as needed for itching and scaling, Disp: 20 mL, Rfl: 0    GARLIC (GARLIQUE ORAL), Take 1 capsule by mouth once daily., Disp: , Rfl:     magnesium oxide (MAG-OX) 400 mg (241.3 mg magnesium) tablet, Take 400 mg by mouth once daily., Disp: , Rfl:     MULTIVITAMIN (MULTIPLE VITAMINS ORAL), Take by mouth., Disp: , Rfl:     ondansetron (ZOFRAN-ODT) 4 MG TbDL, Take 1 tablet (4 mg total) by mouth every 8 (eight) hours as needed (nausea, vomiting)., Disp: 12 tablet, Rfl: 0    red yeast rice 600 mg Cap, Take 2 capsules by mouth every evening., Disp: , Rfl:     sertraline (ZOLOFT) 100 MG tablet, Take 1.5 tablets by mouth once daily. 125mg daily, Disp: , Rfl:     sertraline (ZOLOFT) 25 MG tablet, Take 25 mg by mouth once daily. Takes 125 mg daily, Disp: , Rfl:     amitriptyline (ELAVIL) 10 MG tablet, Take 1 tablet (10 mg total) by mouth nightly as needed for Insomnia., Disp: 30 tablet, Rfl: 1    fluticasone propionate  "(FLONASE) 50 mcg/actuation nasal spray, 1 spray (50 mcg total) by Each Nostril route once daily. (Patient not taking: Reported on 11/22/2022), Disp: 15.8 mL, Rfl: 0    gabapentin (NEURONTIN) 300 MG capsule, 1 p.o. in the morning 1 p.o. in the afternoon and 2 p.o. at night (Patient not taking: Reported on 11/22/2022), Disp: 120 capsule, Rfl: 11    nitroGLYCERIN (NITROSTAT) 0.4 MG SL tablet, Place 1 tablet (0.4 mg total) under the tongue every 5 (five) minutes as needed for Chest pain. (Patient not taking: Reported on 11/22/2022), Disp: 20 tablet, Rfl: 1      Review of Systems   Constitutional:  Negative for chills and fever.   Respiratory:  Negative for cough, chest tightness and shortness of breath.    Cardiovascular:  Negative for chest pain and palpitations.   Gastrointestinal:  Negative for abdominal pain, diarrhea, nausea and vomiting.   Neurological:  Negative for dizziness and headaches.       Objective:   /64 (BP Location: Left arm, Patient Position: Sitting, BP Method: Medium (Manual))   Pulse 66   Temp 97.8 °F (36.6 °C) (Oral)   Ht 5' 2" (1.575 m)   Wt 81.9 kg (180 lb 8.9 oz)   LMP 09/25/1990   SpO2 97%   BMI 33.02 kg/m²         Physical Exam  Constitutional:       General: She is not in acute distress.     Appearance: Normal appearance.   HENT:      Head: Atraumatic.   Cardiovascular:      Rate and Rhythm: Normal rate and regular rhythm.      Pulses: Normal pulses.      Heart sounds: Normal heart sounds.   Pulmonary:      Effort: Pulmonary effort is normal.      Breath sounds: Normal breath sounds.   Abdominal:      General: Abdomen is flat. Bowel sounds are normal.      Palpations: Abdomen is soft.   Skin:     General: Skin is warm and dry.   Neurological:      General: No focal deficit present.      Mental Status: She is alert and oriented to person, place, and time.   Psychiatric:         Mood and Affect: Mood normal.       Assessment:       1. Stomach pain    2. Hypokalemia    3. Breast " cancer screening by mammogram    4. Encounter for completion of form with patient        Plan:       1. Hypokalemia  - Comprehensive Metabolic Panel; Future    2. Breast cancer screening by mammogram  - Mammo Digital Screening Bilat; Future    3. Stomach pain  - patient reports symptoms resolved    4. Encounter for completion of form with patient  - will inform patient when the form is ready to     Patient with be reevaluated in  as scheduled  or sooner celina Noguera NP

## 2022-12-15 ENCOUNTER — HOSPITAL ENCOUNTER (OUTPATIENT)
Dept: RADIOLOGY | Facility: CLINIC | Age: 72
Discharge: HOME | End: 2022-12-15
Attending: NURSE PRACTITIONER
Payer: MEDICARE

## 2022-12-15 DIAGNOSIS — Z12.31 BREAST CANCER SCREENING BY MAMMOGRAM: ICD-10-CM

## 2022-12-15 PROCEDURE — 77063 BREAST TOMOSYNTHESIS BI: CPT | Mod: 26,,, | Performed by: RADIOLOGY

## 2022-12-15 PROCEDURE — 77067 SCR MAMMO BI INCL CAD: CPT | Mod: 26,,, | Performed by: RADIOLOGY

## 2022-12-15 PROCEDURE — 77067 MAMMO DIGITAL SCREENING BILAT WITH TOMO: ICD-10-PCS | Mod: 26,,, | Performed by: RADIOLOGY

## 2022-12-15 PROCEDURE — 77063 MAMMO DIGITAL SCREENING BILAT WITH TOMO: ICD-10-PCS | Mod: 26,,, | Performed by: RADIOLOGY

## 2022-12-15 PROCEDURE — 77063 BREAST TOMOSYNTHESIS BI: CPT | Mod: TC,PO

## 2022-12-19 ENCOUNTER — TELEPHONE (OUTPATIENT)
Dept: FAMILY MEDICINE | Facility: CLINIC | Age: 72
End: 2022-12-19
Payer: MEDICARE

## 2022-12-19 NOTE — TELEPHONE ENCOUNTER
Called patient in regards to questions concerning paperwork. No answer , left voicemail to return call.

## 2022-12-19 NOTE — TELEPHONE ENCOUNTER
----- Message from Sharon Stiles sent at 12/19/2022 10:41 AM CST -----  Type: Patient Call Back         Who called: Pt          What is the request in detail: Pt called in regarding information in regards to  paperwork that was to be filled out on 12/9/22          Can the clinic reply by [facility]? No          Would the patient rather a call back or a response via [facility]? Call back          Best call back number: [phone] (mobile)          Additional Information:           Thank You

## 2022-12-29 ENCOUNTER — TELEPHONE (OUTPATIENT)
Dept: FAMILY MEDICINE | Facility: CLINIC | Age: 72
End: 2022-12-29
Payer: MEDICARE

## 2022-12-29 NOTE — TELEPHONE ENCOUNTER
----- Message from Cortez Saldana sent at 12/28/2022  2:19 PM CST -----  Type: Needs Medical Advice  Who Called:  Patient    Best Call Back Number: [phone]  Additional Information: Patient states that she would like a callback regarding questions about the paperwork she received for her employer on her visit on 12/09/22

## 2022-12-30 NOTE — TELEPHONE ENCOUNTER
Caller: Chinedu Bronson    Relationship to patient: Self    Best call back number: [phone]        Date of positive COVID19 test: 12/24/2022        COVID19 symptoms: HEADACHES COUGH FATIGUE         Additional information or concerns: THE PATIENT WOULD LIKE TO KNOW IF THE DOCTOR WANTS HIM TO INCREASE HIS PREDNISONE        "Patient reports flu like symptoms of diarrhea, vomiting, body aches, and headaches. States symptoms started 3 days ago. States she took a leave of absence from her job due to symptoms. Writer requested to know where patient was tested for flu and diagnosed with flu. Patient states "nowhere I diagnosed myself."  Writer advised this would not work as far a medical leave from work. First available in office appointment noted on 12-9-22. Patient states she would be willing to perform virtual appointment, but during scheduling process patient was not able to confirm she has access to [facility] on smart phone or tablet. Advised patient virtual appointment could not be scheduled if patient does not have access to [facility] on smart phone or tablet. Recommended urgent care, but did preference this with not sure if urgent care completes forms for medical leave. Patient state she would call and ask. Patient scheduled first available in office appointment on 12-9-22. Offered to look in [place], [place], or [place]  for earlier appointment. Patient declined.   "

## 2023-01-09 ENCOUNTER — TELEPHONE (OUTPATIENT)
Dept: FAMILY MEDICINE | Facility: CLINIC | Age: 73
End: 2023-01-09
Payer: MEDICARE

## 2023-01-09 NOTE — TELEPHONE ENCOUNTER
----- Message from Cortez Saldana sent at 1/9/2023 11:07 AM CST -----  Type:  Patient Returning Call    Who Called:  Patient  Who Left Message for Patient:  Mirta  Does the patient know what this is regarding?:    Best Call Back Number:  [phone]  Additional Information:

## 2023-01-13 ENCOUNTER — TELEPHONE (OUTPATIENT)
Dept: FAMILY MEDICINE | Facility: CLINIC | Age: 73
End: 2023-01-13
Payer: MEDICARE

## 2023-01-23 ENCOUNTER — TELEPHONE (OUTPATIENT)
Dept: FAMILY MEDICINE | Facility: CLINIC | Age: 73
End: 2023-01-23
Payer: MEDICARE

## 2023-01-23 ENCOUNTER — TELEPHONE (OUTPATIENT)
Dept: OPTOMETRY | Facility: CLINIC | Age: 73
End: 2023-01-23
Payer: MEDICARE

## 2023-01-23 NOTE — TELEPHONE ENCOUNTER
----- Message from Anita Bello sent at 1/23/2023  2:30 PM CST -----  Contact: patient  Type:  Sooner Apoointment Request      Name of Caller: patient     When is the first available appointment? 3/21    Symptoms: eye problems     Would the patient rather a call back or a response via [facility]? Call     Best Call Back Number:[phone] (home)      Additional Information:

## 2023-01-23 NOTE — TELEPHONE ENCOUNTER
Spoke to patient scheduled appt for floaters OS family H/O retina issues. Patient aware of location and time. Also informed no routine exam strictly urgent appt for OS patient to come back for routine as scheduled in March.

## 2023-01-24 ENCOUNTER — TELEPHONE (OUTPATIENT)
Dept: OPTOMETRY | Facility: CLINIC | Age: 73
End: 2023-01-24
Payer: MEDICARE

## 2023-01-24 NOTE — TELEPHONE ENCOUNTER
----- Message from Sharon Stiles sent at 1/24/2023 12:04 PM CST -----  Type:  Patient Returning Call    Who Called: Pt     Who Left Message for Patient:Sharmin Aj       Does the patient know what this is regarding?:Yes     Would the patient rather a call back or a response via [facility]?  Call back     Best Call Back Number (mobile). [phone]    Additional Information:  Pt states that she cant come in at a different time

## 2023-01-25 ENCOUNTER — TELEPHONE (OUTPATIENT)
Dept: OPTOMETRY | Facility: CLINIC | Age: 73
End: 2023-01-25
Payer: MEDICARE

## 2023-01-25 ENCOUNTER — OFFICE VISIT (OUTPATIENT)
Dept: OPTOMETRY | Facility: CLINIC | Age: 73
End: 2023-01-25
Payer: MEDICARE

## 2023-01-25 DIAGNOSIS — H52.7 REFRACTIVE ERROR: ICD-10-CM

## 2023-01-25 DIAGNOSIS — Z96.1 PSEUDOPHAKIA: ICD-10-CM

## 2023-01-25 DIAGNOSIS — H43.821 VITREOMACULAR ADHESION OF RIGHT EYE: ICD-10-CM

## 2023-01-25 DIAGNOSIS — H26.493 BILATERAL POSTERIOR CAPSULAR OPACIFICATION: ICD-10-CM

## 2023-01-25 DIAGNOSIS — H43.812 POSTERIOR VITREOUS DETACHMENT OF LEFT EYE: Primary | ICD-10-CM

## 2023-01-25 DIAGNOSIS — H35.373 EPIRETINAL MEMBRANE (ERM) OF BOTH EYES: ICD-10-CM

## 2023-01-25 PROCEDURE — 1126F PR PAIN SEVERITY QUANTIFIED, NO PAIN PRESENT: ICD-10-PCS | Mod: CPTII,S$GLB,, | Performed by: OPTOMETRIST

## 2023-01-25 PROCEDURE — 99204 PR OFFICE/OUTPT VISIT, NEW, LEVL IV, 45-59 MIN: ICD-10-PCS | Mod: S$GLB,,, | Performed by: OPTOMETRIST

## 2023-01-25 PROCEDURE — 99999 PR PBB SHADOW E&M-EST. PATIENT-LVL III: CPT | Mod: PBBFAC,,, | Performed by: OPTOMETRIST

## 2023-01-25 PROCEDURE — 1157F ADVNC CARE PLAN IN RCRD: CPT | Mod: CPTII,S$GLB,, | Performed by: OPTOMETRIST

## 2023-01-25 PROCEDURE — 1159F MED LIST DOCD IN RCRD: CPT | Mod: CPTII,S$GLB,, | Performed by: OPTOMETRIST

## 2023-01-25 PROCEDURE — 99999 PR PBB SHADOW E&M-EST. PATIENT-LVL III: ICD-10-PCS | Mod: PBBFAC,,, | Performed by: OPTOMETRIST

## 2023-01-25 PROCEDURE — 1160F RVW MEDS BY RX/DR IN RCRD: CPT | Mod: CPTII,S$GLB,, | Performed by: OPTOMETRIST

## 2023-01-25 PROCEDURE — 1159F PR MEDICATION LIST DOCUMENTED IN MEDICAL RECORD: ICD-10-PCS | Mod: CPTII,S$GLB,, | Performed by: OPTOMETRIST

## 2023-01-25 PROCEDURE — 99204 OFFICE O/P NEW MOD 45 MIN: CPT | Mod: S$GLB,,, | Performed by: OPTOMETRIST

## 2023-01-25 PROCEDURE — 3288F PR FALLS RISK ASSESSMENT DOCUMENTED: ICD-10-PCS | Mod: CPTII,S$GLB,, | Performed by: OPTOMETRIST

## 2023-01-25 PROCEDURE — 1126F AMNT PAIN NOTED NONE PRSNT: CPT | Mod: CPTII,S$GLB,, | Performed by: OPTOMETRIST

## 2023-01-25 PROCEDURE — 1160F PR REVIEW ALL MEDS BY PRESCRIBER/CLIN PHARMACIST DOCUMENTED: ICD-10-PCS | Mod: CPTII,S$GLB,, | Performed by: OPTOMETRIST

## 2023-01-25 PROCEDURE — 92134 POSTERIOR SEGMENT OCT RETINA (OCULAR COHERENCE TOMOGRAPHY)-BOTH EYES: ICD-10-PCS | Mod: S$GLB,,, | Performed by: OPTOMETRIST

## 2023-01-25 PROCEDURE — 3288F FALL RISK ASSESSMENT DOCD: CPT | Mod: CPTII,S$GLB,, | Performed by: OPTOMETRIST

## 2023-01-25 PROCEDURE — 1157F PR ADVANCE CARE PLAN OR EQUIV PRESENT IN MEDICAL RECORD: ICD-10-PCS | Mod: CPTII,S$GLB,, | Performed by: OPTOMETRIST

## 2023-01-25 PROCEDURE — 1101F PT FALLS ASSESS-DOCD LE1/YR: CPT | Mod: CPTII,S$GLB,, | Performed by: OPTOMETRIST

## 2023-01-25 PROCEDURE — 92134 CPTRZ OPH DX IMG PST SGM RTA: CPT | Mod: S$GLB,,, | Performed by: OPTOMETRIST

## 2023-01-25 PROCEDURE — 1101F PR PT FALLS ASSESS DOC 0-1 FALLS W/OUT INJ PAST YR: ICD-10-PCS | Mod: CPTII,S$GLB,, | Performed by: OPTOMETRIST

## 2023-01-25 NOTE — TELEPHONE ENCOUNTER
----- Message from Cortez Saldana sent at 1/25/2023  8:05 AM CST -----  Type:  Same Day Appointment Request    Caller is requesting a same day appointment.  Caller declined first available appointment listed below.      Name of Caller:  Patient  When is the first available appointment?  Pt is scheduled for 9 today but would like to come in later  Symptoms:  Blurry vision  Best Call Back Number:  [phone]  Additional Information:                Problem: Physical Therapy Goal  Goal: Physical Therapy Goal  Goals to be met by: 9/27/2018     Patient will increase functional independence with mobility by performing:    Updated 8/27/2018: Re-Mariangel completed this date  1. Supine to sit with Modified [place]  2. Sit to supine with Modified [place]  3. Rolling to Left and Right with Modified [place].  4. Sit to stand transfer with Stand-by Assistance with AD  5. Bed to chair transfer with Stand-by Assistance with AD  6. Gait  x  feet with Stand-by Assistance using AD.   7. Ascend/Descend 8 inch curb step with Contact Guard Assistance and Minimal Assistance using AD  8. Lower extremity exercise program x10 reps with supervision      Outcome: Ongoing (interventions implemented as appropriate)  Pt unable to tolerate much activity this AM 2/2 elevated BP, c/o slight headache, nausea and vomiting up clear fluid, not feeling well; nurse informed and present; seen by MD and pt will be getting BP and nausea meds; will cont with POC.

## 2023-01-25 NOTE — PROGRESS NOTES
HPI    Pt c/o floater OS x few weeks. Denies flashes. Pt c/o decreased vision OS.     Pt denies pain.     Pt states may have been a month, OS is blurred  Round blob with a black dot in the middle  Moves around  Last edited by Damian Salvador, OD on 1/25/2023  9:34 AM.            Assessment /Plan     For exam results, see Encounter Report.    Posterior vitreous detachment of left eye    Pseudophakia    Bilateral posterior capsular opacification  -     Ambulatory referral/consult to Ophthalmology; Future; Expected date: 02/25/2023    Refractive error    Epiretinal membrane (ERM) of both eyes  -     Posterior Segment OCT Retina-Both eyes    Vitreomacular adhesion of right eye      1. Posterior vitreous detachment of left eye  Negative ирина's sign, no holes/tears/breaks/RD OS  RTC immediately if any flashes, floaters, decreased vision, veiling of vision occurs.    2. Pseudophakia  3. Bilateral posterior capsular opacification  S/p cataract extraction 2-3+ years ago  PCO OU, no improvement with refraction  Referred to Dr. Smith for YAG    - Ambulatory referral/consult to Ophthalmology; Future    4. Refractive error  No spec rx at this time    5. Epiretinal membrane (ERM) of both eyes  6. Vitreomacular adhesion of right eye  Mild, not visually significant. Monitor yearly, sooner if any vision changes.     - Posterior Segment OCT Retina-Both eyes

## 2023-02-09 ENCOUNTER — PES CALL (OUTPATIENT)
Dept: ADMINISTRATIVE | Facility: CLINIC | Age: 73
End: 2023-02-09
Payer: MEDICARE

## 2023-02-24 ENCOUNTER — TELEPHONE (OUTPATIENT)
Dept: FAMILY MEDICINE | Facility: CLINIC | Age: 73
End: 2023-02-24
Payer: MEDICARE

## 2023-02-24 ENCOUNTER — OFFICE VISIT (OUTPATIENT)
Dept: URGENT CARE | Facility: CLINIC | Age: 73
End: 2023-02-24
Payer: MEDICARE

## 2023-02-24 VITALS
HEART RATE: 58 BPM | RESPIRATION RATE: 16 BRPM | OXYGEN SATURATION: 100 % | BODY MASS INDEX: 33.13 KG/M2 | SYSTOLIC BLOOD PRESSURE: 119 MMHG | HEIGHT: 62 IN | DIASTOLIC BLOOD PRESSURE: 75 MMHG | TEMPERATURE: 97 F | WEIGHT: 180 LBS

## 2023-02-24 DIAGNOSIS — R19.7 DIARRHEA, UNSPECIFIED TYPE: ICD-10-CM

## 2023-02-24 DIAGNOSIS — R68.83 CHILLS: ICD-10-CM

## 2023-02-24 DIAGNOSIS — R11.0 NAUSEA: ICD-10-CM

## 2023-02-24 DIAGNOSIS — K29.70 VIRAL GASTRITIS: Primary | ICD-10-CM

## 2023-02-24 PROCEDURE — 3074F SYST BP LT 130 MM HG: CPT | Mod: CPTII,S$GLB,,

## 2023-02-24 PROCEDURE — 1159F MED LIST DOCD IN RCRD: CPT | Mod: CPTII,S$GLB,,

## 2023-02-24 PROCEDURE — 99214 PR OFFICE/OUTPT VISIT, EST, LEVL IV, 30-39 MIN: ICD-10-PCS | Mod: S$GLB,,,

## 2023-02-24 PROCEDURE — 3008F BODY MASS INDEX DOCD: CPT | Mod: CPTII,S$GLB,,

## 2023-02-24 PROCEDURE — 1157F ADVNC CARE PLAN IN RCRD: CPT | Mod: CPTII,S$GLB,,

## 2023-02-24 PROCEDURE — 1157F PR ADVANCE CARE PLAN OR EQUIV PRESENT IN MEDICAL RECORD: ICD-10-PCS | Mod: CPTII,S$GLB,,

## 2023-02-24 PROCEDURE — 99214 OFFICE O/P EST MOD 30 MIN: CPT | Mod: S$GLB,,,

## 2023-02-24 PROCEDURE — 3074F PR MOST RECENT SYSTOLIC BLOOD PRESSURE < 130 MM HG: ICD-10-PCS | Mod: CPTII,S$GLB,,

## 2023-02-24 PROCEDURE — 3008F PR BODY MASS INDEX (BMI) DOCUMENTED: ICD-10-PCS | Mod: CPTII,S$GLB,,

## 2023-02-24 PROCEDURE — 1159F PR MEDICATION LIST DOCUMENTED IN MEDICAL RECORD: ICD-10-PCS | Mod: CPTII,S$GLB,,

## 2023-02-24 PROCEDURE — 3078F DIAST BP <80 MM HG: CPT | Mod: CPTII,S$GLB,,

## 2023-02-24 PROCEDURE — 3078F PR MOST RECENT DIASTOLIC BLOOD PRESSURE < 80 MM HG: ICD-10-PCS | Mod: CPTII,S$GLB,,

## 2023-02-24 NOTE — TELEPHONE ENCOUNTER
----- Message from Nirav Rose sent at 2/24/2023  9:50 AM CST -----  Contact: self  Type:  Patient Returning Call    Who Called:  Patient  Who Left Message for Patient:  Ciera  Does the patient know what this is regarding?:  no  Best Call Back Number:  [phone]    Additional Information:  Please call back

## 2023-02-24 NOTE — LETTER
February 24, 2023      [facility]  [address]  [facility] [address]  Phone: [phone]       Patient: Syeda Gonsalves   YOB: 1950  Date of Visit: 02/24/2023    To Whom It May Concern:    Mable Gonsalves  was at [facility] on 02/24/2023. Please excuse this patient from work on 02/23/2023 due to illness. If you have any questions or concerns, or if I can be of further assistance, please do not hesitate to contact me.    Sincerely,    Jose Jeffries, NP

## 2023-02-24 NOTE — TELEPHONE ENCOUNTER
----- Message from Jackeline Monroy sent at 2/23/2023  5:30 PM CST -----  Contact: Pt  .Type:  Sooner Appointment Request    Caller is requesting a sooner appointment.  Caller declined first available appointment listed below.  Caller will not accept being placed on the waitlist and is requesting a message be sent to doctor.  Name of Caller:Pt  When is the first available appointment?4/17  Symptoms:neuro virus  Would the patient rather a call back or a response via [facility]? call  Best Call Back Number:[phone]  Additional Information:

## 2023-02-24 NOTE — TELEPHONE ENCOUNTER
Patient states she started feeling ill on Tuesday. Has had weakness, body aches, nausea and vomiting. Was sent home from work early yesterday because she was feeling bad. Needs work excuse for yesterday so she can return to work tomorrow.  No appointments available.   Please advise.

## 2023-02-25 NOTE — PATIENT INSTRUCTIONS
"If you condition worsening or if symptoms of abdominal pain, vomiting that is not relieved with medication, fever not lowered with OTC medication, go straight to the emergency room for further work up and evaluation.     Start BRAT diet " Bananas, rice, applesauce, toast". Increase hydration, use supplements like Pedialyte or Gatorade. Ensure you consume 8, 8oz glasses of water per day.       WE CANNOT RULE OUT ALL POSSIBLE CAUSES OF YOUR SYMPTOMS IN THE URGENT CARE SETTING PLEASE GO TO THE ER IF YOU FEELS YOUR CONDITION IS WORSENING OR YOU WOULD LIKE EMERGENT EVALUATION     Repeat home covid test in 24 hours if symptoms persist.    Follow up with your PCP on Monday if symptoms persist.     Start over the counter pepcid as directed.     Take zofran as needed for nausea.   "

## 2023-02-25 NOTE — PROGRESS NOTES
"Subjective:       Patient ID: Syeda Gonsalves is a 72 y.o. female.    Vitals:  height is 5' 2" (1.575 m) and weight is 81.6 kg (180 lb). Her oral temperature is 97.4 °F (36.3 °C). Her blood pressure is 119/75 and her pulse is 58 (abnormal). Her respiration is 16 and oxygen saturation is 100%.     Chief Complaint: Letter for School/Work    Pt states "she needs a note for work after being sick yesterday at work."    Constitution: Positive for chills. Negative for activity change, appetite change, sweating and fever.   HENT:  Negative for ear pain, sinus pain, sinus pressure and sore throat.    Neck: Negative for neck pain.   Cardiovascular:  Negative for chest pain.   Eyes:  Negative for blurred vision.   Respiratory:  Negative for chest tightness, cough and shortness of breath.    Gastrointestinal:  Positive for nausea and diarrhea. Negative for abdominal pain, vomiting, constipation, bright red blood in stool and dark colored stools.   Neurological:  Negative for dizziness and history of vertigo.     Objective:      Physical Exam   Constitutional:  Non-toxic appearance. She does not appear ill. No distress.   Eyes: Conjunctivae are normal. Extraocular movement intact   Cardiovascular: Normal rate, normal heart sounds and normal pulses.   Pulmonary/Chest: Breath sounds normal. No respiratory distress. She has no wheezes. She has no rhonchi.   Abdominal: Soft. There is no abdominal tenderness. There is no guarding, no left CVA tenderness and no right CVA tenderness.   Neurological: no focal deficit. She is alert.   Skin: Skin is not diaphoretic. Capillary refill takes 2 to 3 seconds.   Psychiatric: Mood normal.       Assessment:       1. Viral gastritis    2. Chills    3. Diarrhea, unspecified type    4. Nausea          Plan:         Viral gastritis    Chills    Diarrhea, unspecified type    Nausea         Pt presents requesting a work note, states she missed work on yesterday due to illness, thinks she had a " stomach virus, covid negative, reports diarrhea, chills and nausea, reports she feels much better, still having some nausea that is relieved with zofran, she is tolerating po liquids and solids, no abdominal pain on exam, denies blood in stool, educated on brat diet and proper hydration, pt given ER precautions if worsen, high suspicion for viral gastritis, denies any fever in past 24 hours.

## 2023-02-27 ENCOUNTER — TELEPHONE (OUTPATIENT)
Dept: OPTOMETRY | Facility: CLINIC | Age: 73
End: 2023-02-27
Payer: MEDICARE

## 2023-02-27 NOTE — TELEPHONE ENCOUNTER
Spoke to pt and cancelled second appt     -TD    ----- Message from Shaji Jean sent at 2/27/2023  1:40 PM CST -----  Contact: patient  Type:  Patient Call          Who Called: Patient         Does the patient know what this is regarding?: requesting a call back about appt scheduled on 3/21 pt would like to know if she still need this appt after seeing the Dr on 3/14 ; please advise           Would the patient rather a call back or a response via [facility]? Call           Best Call Back Number:[phone]             Additional Information:

## 2023-02-27 NOTE — TELEPHONE ENCOUNTER
I spoke with pt via phone. She states that she is much better. She is eating now and she thinks whatever she had has passed. No further questions.

## 2023-03-06 ENCOUNTER — OFFICE VISIT (OUTPATIENT)
Dept: PODIATRY | Facility: CLINIC | Age: 73
End: 2023-03-06
Payer: MEDICARE

## 2023-03-06 ENCOUNTER — HOSPITAL ENCOUNTER (OUTPATIENT)
Dept: RADIOLOGY | Facility: CLINIC | Age: 73
Discharge: HOME | End: 2023-03-06
Attending: PODIATRIST
Payer: MEDICARE

## 2023-03-06 VITALS — HEART RATE: 56 BPM | HEIGHT: 62 IN | WEIGHT: 179 LBS | OXYGEN SATURATION: 95 % | BODY MASS INDEX: 32.94 KG/M2

## 2023-03-06 DIAGNOSIS — M72.2 PLANTAR FASCIITIS: Primary | ICD-10-CM

## 2023-03-06 DIAGNOSIS — M79.672 PAIN OF LEFT HEEL: ICD-10-CM

## 2023-03-06 PROCEDURE — 1157F ADVNC CARE PLAN IN RCRD: CPT | Mod: CPTII,S$GLB,, | Performed by: PODIATRIST

## 2023-03-06 PROCEDURE — 3288F FALL RISK ASSESSMENT DOCD: CPT | Mod: CPTII,S$GLB,, | Performed by: PODIATRIST

## 2023-03-06 PROCEDURE — 1125F PR PAIN SEVERITY QUANTIFIED, PAIN PRESENT: ICD-10-PCS | Mod: CPTII,S$GLB,, | Performed by: PODIATRIST

## 2023-03-06 PROCEDURE — 1160F PR REVIEW ALL MEDS BY PRESCRIBER/CLIN PHARMACIST DOCUMENTED: ICD-10-PCS | Mod: CPTII,S$GLB,, | Performed by: PODIATRIST

## 2023-03-06 PROCEDURE — 3008F PR BODY MASS INDEX (BMI) DOCUMENTED: ICD-10-PCS | Mod: CPTII,S$GLB,, | Performed by: PODIATRIST

## 2023-03-06 PROCEDURE — 3288F PR FALLS RISK ASSESSMENT DOCUMENTED: ICD-10-PCS | Mod: CPTII,S$GLB,, | Performed by: PODIATRIST

## 2023-03-06 PROCEDURE — 1101F PR PT FALLS ASSESS DOC 0-1 FALLS W/OUT INJ PAST YR: ICD-10-PCS | Mod: CPTII,S$GLB,, | Performed by: PODIATRIST

## 2023-03-06 PROCEDURE — 73630 XR FOOT COMPLETE 3 VIEW LEFT: ICD-10-PCS | Mod: LT,S$GLB,, | Performed by: RADIOLOGY

## 2023-03-06 PROCEDURE — 1157F PR ADVANCE CARE PLAN OR EQUIV PRESENT IN MEDICAL RECORD: ICD-10-PCS | Mod: CPTII,S$GLB,, | Performed by: PODIATRIST

## 2023-03-06 PROCEDURE — 1159F MED LIST DOCD IN RCRD: CPT | Mod: CPTII,S$GLB,, | Performed by: PODIATRIST

## 2023-03-06 PROCEDURE — 73630 X-RAY EXAM OF FOOT: CPT | Mod: LT,S$GLB,, | Performed by: RADIOLOGY

## 2023-03-06 PROCEDURE — 3008F BODY MASS INDEX DOCD: CPT | Mod: CPTII,S$GLB,, | Performed by: PODIATRIST

## 2023-03-06 PROCEDURE — 99203 OFFICE O/P NEW LOW 30 MIN: CPT | Mod: S$GLB,,, | Performed by: PODIATRIST

## 2023-03-06 PROCEDURE — 99203 PR OFFICE/OUTPT VISIT, NEW, LEVL III, 30-44 MIN: ICD-10-PCS | Mod: S$GLB,,, | Performed by: PODIATRIST

## 2023-03-06 PROCEDURE — 1159F PR MEDICATION LIST DOCUMENTED IN MEDICAL RECORD: ICD-10-PCS | Mod: CPTII,S$GLB,, | Performed by: PODIATRIST

## 2023-03-06 PROCEDURE — 1101F PT FALLS ASSESS-DOCD LE1/YR: CPT | Mod: CPTII,S$GLB,, | Performed by: PODIATRIST

## 2023-03-06 PROCEDURE — 1125F AMNT PAIN NOTED PAIN PRSNT: CPT | Mod: CPTII,S$GLB,, | Performed by: PODIATRIST

## 2023-03-06 PROCEDURE — 1160F RVW MEDS BY RX/DR IN RCRD: CPT | Mod: CPTII,S$GLB,, | Performed by: PODIATRIST

## 2023-03-06 NOTE — PATIENT INSTRUCTIONS

## 2023-03-06 NOTE — PROGRESS NOTES
"  [address]  [place] [address]  Phone: [phone]   [phone]    Patient's PCP:Surinder Saldaña MD  Referring Provider: Aaareferral Self    Subjective:      Chief Complaint:: Heel Pain (Left foot )    TATA Gonsalves is a 72 y.o. female who presents today with a complaint of left heel pain lasting for about 2 weeks. Onset of symptoms woke up and felt pain when trying to walk and reports no trauma.  Current symptoms include pain and redness.  Aggravating factors are walking a lot. Symptoms have progressed. Treatment to date have included Icy hot, lidocaine and glucosamine.        Vitals:    03/06/23 1139   Pulse: (!) 56   SpO2: 95%   Weight: 81.2 kg (179 lb)   Height: 5' 2" (1.575 m)   PainSc:   5      Shoe Size: 8.5-9    Past Surgical History:   Procedure Laterality Date    ADENOIDECTOMY      ADRENALECTOMY      CARPAL TUNNEL RELEASE      CHOLECYSTECTOMY      COLONOSCOPY N/A 12/21/2017    Procedure: COLONOSCOPY;  Surgeon: Harriett Pereyra MD;  Location: [facility];  Service: Endoscopy;  Laterality: N/A;    ESOPHAGOGASTRODUODENOSCOPY N/A 07/18/2018    Procedure: EGD (ESOPHAGOGASTRODUODENOSCOPY);  Surgeon: Harriett Pereyra MD;  Location: [facility];  Service: Endoscopy;  Laterality: N/A;    EYE SURGERY      HYSTERECTOMY      OOPHORECTOMY      PERCUTANEOUS TENOTOMY Right 07/27/2022    Procedure: TENOTOMY, PERCUTANEOUS;  Surgeon: Doris Galan DO;  Location: [facility] OR;  Service: Orthopedics;  Laterality: Right;    TONSILLECTOMY      UPPER GASTROINTESTINAL ENDOSCOPY       Past Medical History:   Diagnosis Date    Abnormal Pap smear 1990    Allergy to statin medication     Anxiety     Arthritis     Asthma     Back pain     Cervical cancer     Depression     GERD (gastroesophageal reflux disease)     H/O head and neck radiation 1/11/2017    Hyperlipidemia     Kidney insufficiency     Multinodular goiter 1/11/2017    Obesity     Smoking 1/11/2017    Uterine cancer      Family " History   Problem Relation Age of Onset    Macular degeneration Mother     Heart disease Mother         MI    Heart disease Father         MI    Macular degeneration Brother     Hyperlipidemia Brother     ANTHONY disease Maternal Grandmother     Bladder Cancer Paternal Grandmother     Heart disease Paternal Grandfather         MI    ANTHONY disease Daughter     Diabetes Son     Breast cancer Neg Hx     Colon cancer Neg Hx     Ovarian cancer Neg Hx     Cancer Neg Hx         Social History:   Marital Status:   Alcohol History:  reports no history of alcohol use.  Tobacco History:  reports that she has been smoking cigarettes. She has a 5.00 pack-year smoking history. She has never used smokeless tobacco.  Drug History:  reports no history of drug use.    Review of patient's allergies indicates:   Allergen Reactions    Flu vaccine 2011 (36 mos+)(pf) Anaphylaxis    Iodine and iodide containing products     Lipitor [atorvastatin]     Niacin preparations     Statins-hmg-coa reductase inhibitors Hives    Welchol [colesevelam] Other (See Comments)     GI upset and insomnia    Zetia [ezetimibe] Nausea And Vomiting       Current Outpatient Medications   Medication Sig Dispense Refill    amitriptyline (ELAVIL) 10 MG tablet Take 1 tablet (10 mg total) by mouth nightly as needed for Insomnia. 30 tablet 1    ergocalciferol (ERGOCALCIFEROL) 50,000 unit Cap Take 1 capsule (50,000 Units total) by mouth every 7 days. 12 capsule 3    fluticasone propionate (FLONASE) 50 mcg/actuation nasal spray 1 spray (50 mcg total) by Each Nostril route once daily. 15.8 mL 0    gabapentin (NEURONTIN) 300 MG capsule 1 p.o. in the morning 1 p.o. in the afternoon and 2 p.o. at night 120 capsule 11    GARLIC (GARLIQUE ORAL) Take 1 capsule by mouth once daily.      magnesium oxide (MAG-OX) 400 mg (241.3 mg magnesium) tablet Take 400 mg by mouth once daily.      MULTIVITAMIN (MULTIPLE VITAMINS ORAL) Take by mouth.      red yeast rice 600 mg Cap Take 2  capsules by mouth every evening.      sertraline (ZOLOFT) 100 MG tablet Take 1.5 tablets by mouth once daily. 125mg daily      sertraline (ZOLOFT) 25 MG tablet Take 50 mg by mouth once daily. Takes 125 mg daily      clindamycin phosphate 1% (CLINDAGEL) 1 % gel Apply topically 2 (two) times daily. (Patient not taking: Reported on 3/6/2023) 60 g 3    fluocinolone acetonide oiL 0.01 % Drop 3-4 drops to the affected ear(s) twice daily no more than a week at a time as needed for itching and scaling (Patient not taking: Reported on 2/24/2023) 20 mL 0    ondansetron (ZOFRAN-ODT) 4 MG TbDL Take 1 tablet (4 mg total) by mouth every 8 (eight) hours as needed (nausea, vomiting). (Patient not taking: Reported on 3/6/2023) 12 tablet 0    pantoprazole (PROTONIX) 40 MG tablet Take 1 tablet (40 mg total) by mouth once daily. (Patient not taking: Reported on 1/25/2023) 30 tablet 0     No current facility-administered medications for this visit.       Review of Systems   Constitutional:  Negative for chills, fatigue, fever and unexpected weight change.   HENT:  Negative for hearing loss and trouble swallowing.    Eyes:  Negative for photophobia and visual disturbance.   Respiratory:  Negative for cough, shortness of breath and wheezing.    Cardiovascular:  Negative for chest pain, palpitations and leg swelling.   Gastrointestinal:  Negative for abdominal pain and nausea.   Genitourinary:  Negative for dysuria and frequency.   Musculoskeletal:  Negative for arthralgias, back pain, gait problem, joint swelling and myalgias.   Skin:  Negative for rash and wound.   Neurological:  Negative for tremors, seizures, weakness, numbness and headaches.   Hematological:  Does not bruise/bleed easily.       Objective:        Physical Exam:   Foot Exam    General  General Appearance: appears stated age and healthy   Orientation: alert and oriented to person, place, and time   Affect: appropriate   Gait: unimpaired       Left Foot/Ankle       Inspection and Palpation  Ecchymosis: none  Tenderness: plantar fascia   Swelling: none   Arch: normal  Hammertoes: absent  Claw toes: absent  Hallux valgus: no  Hallux limitus: no  Skin Exam: skin intact; no drainage, no ulcer and no erythema   Neurovascular  Dorsalis pedis: 2+  Posterior tibial: 2+  Capillary refill: 2+  Varicose veins: not present  Saphenous nerve sensation: normal  Tibial nerve sensation: normal  Superficial peroneal nerve sensation: normal  Deep peroneal nerve sensation: normal  Sural nerve sensation: normal    Muscle Strength  Ankle dorsiflexion: 5  Ankle plantar flexion: 5  Ankle inversion: 5  Ankle eversion: 5  Great toe extension: 5  Great toe flexion: 5    Range of Motion    Normal left ankle ROM    Tests  Anterior drawer: negative   Talar tilt: negative   PT Tinel's sign: negative  Paresthesia: negative  Comments  Pain on palpation of the infeior medial heel and central plantar heel. No pain present  with side to side compression of the calcaneus. Negative tinnel's sign  at the tarsal tunnel. Negative Aguilera's nerve pain. Negative Calcaneal nerve pain. No soft tissue masses. Pain absent  with dorsiflexion of the ankle. No edema, erythema, or ecchymosis noted.     Physical Exam  Cardiovascular:      Pulses:           Dorsalis pedis pulses are 2+ on the left side.        Posterior tibial pulses are 2+ on the left side.   Musculoskeletal:      Left foot: No bunion.   Feet:      Left foot:      Skin integrity: No ulcer or erythema.             Left Ankle/Foot Exam     Range of Motion   The patient has normal left ankle ROM.     Comments:  Pain on palpation of the infeior medial heel and central plantar heel. No pain present  with side to side compression of the calcaneus. Negative tinnel's sign  at the tarsal tunnel. Negative Aguilera's nerve pain. Negative Calcaneal nerve pain. No soft tissue masses. Pain absent  with dorsiflexion of the ankle. No edema, erythema, or ecchymosis noted.          Muscle Strength   Left Lower Extremity   Ankle Dorsiflexion:  5   Plantar flexion:  5/5     Vascular Exam       Left Pulses  Dorsalis Pedis:      2+  Posterior Tibial:      2+         Imaging: X-Ray Foot Complete Left  Narrative: EXAMINATION:  XR FOOT COMPLETE 3 VIEW LEFT    CLINICAL HISTORY:  .  Pain in left foot    TECHNIQUE:  AP, lateral and oblique views of the left foot were performed.    COMPARISON:  None    FINDINGS:  There is no fracture or dislocation.  No significant degenerative changes.  Small dorsal and plantar heel spurs are present.  Impression: No acute osseous abnormality.    Electronically signed by: Lev Cunningham MD  Date:    03/06/2023  Time:    12:01               Assessment:       1. Plantar fasciitis    2. Pain of left heel      Plan:   Plantar fasciitis    Pain of left heel  -     X-Ray Foot Complete Left      Follow up if symptoms worsen or fail to improve.    Procedures        Plantar Fasciitis Level I Treatment:   Anti-inflammatory  No bare feet  Over the counter insert  Restrict activity level   Use running shoes at full time  No impact exercise and stretch gastrocnemius muscle      Counseling:     I provided patient education verbally regarding:   Patient diagnosis, treatment options, as well as alternatives, risks, and benefits.     Discussed different treatment options for heel pain. I gave written and verbal instructions on heel cord stretching and this was demonstrated for the patient. Patient expressed understanding. Discussed wearing appropriate shoe gear and avoiding flats, slippers, sandals, barefoot, and sockfeet. Recommended arch supports. My recommendation for OTC supports is Spenco polysorb replacement insoles or patient may elect more aggressive treatment with prescription arch supports. We also discussed cortisone injections and NSAID therapy.       This note was created using Dragon voice recognition software that occasionally misinterpreted phrases or words.

## 2023-03-14 ENCOUNTER — OFFICE VISIT (OUTPATIENT)
Dept: OPHTHALMOLOGY | Facility: CLINIC | Age: 73
End: 2023-03-14
Payer: MEDICARE

## 2023-03-14 DIAGNOSIS — H26.492 POSTERIOR CAPSULAR OPACIFICATION, LEFT EYE: ICD-10-CM

## 2023-03-14 PROCEDURE — 1157F PR ADVANCE CARE PLAN OR EQUIV PRESENT IN MEDICAL RECORD: ICD-10-PCS | Mod: CPTII,S$GLB,, | Performed by: OPHTHALMOLOGY

## 2023-03-14 PROCEDURE — 1160F RVW MEDS BY RX/DR IN RCRD: CPT | Mod: CPTII,S$GLB,, | Performed by: OPHTHALMOLOGY

## 2023-03-14 PROCEDURE — 66821 AFTER CATARACT LASER SURGERY: CPT | Mod: LT,S$GLB,, | Performed by: OPHTHALMOLOGY

## 2023-03-14 PROCEDURE — 1101F PT FALLS ASSESS-DOCD LE1/YR: CPT | Mod: CPTII,S$GLB,, | Performed by: OPHTHALMOLOGY

## 2023-03-14 PROCEDURE — 1157F ADVNC CARE PLAN IN RCRD: CPT | Mod: CPTII,S$GLB,, | Performed by: OPHTHALMOLOGY

## 2023-03-14 PROCEDURE — 3288F PR FALLS RISK ASSESSMENT DOCUMENTED: ICD-10-PCS | Mod: CPTII,S$GLB,, | Performed by: OPHTHALMOLOGY

## 2023-03-14 PROCEDURE — 99999 PR PBB SHADOW E&M-EST. PATIENT-LVL III: ICD-10-PCS | Mod: PBBFAC,,, | Performed by: OPHTHALMOLOGY

## 2023-03-14 PROCEDURE — 1159F MED LIST DOCD IN RCRD: CPT | Mod: CPTII,S$GLB,, | Performed by: OPHTHALMOLOGY

## 2023-03-14 PROCEDURE — 1126F AMNT PAIN NOTED NONE PRSNT: CPT | Mod: CPTII,S$GLB,, | Performed by: OPHTHALMOLOGY

## 2023-03-14 PROCEDURE — 66821 YAG CAPSULOTOMY - OS - LEFT EYE: ICD-10-PCS | Mod: LT,S$GLB,, | Performed by: OPHTHALMOLOGY

## 2023-03-14 PROCEDURE — 99204 OFFICE O/P NEW MOD 45 MIN: CPT | Mod: 57,S$GLB,, | Performed by: OPHTHALMOLOGY

## 2023-03-14 PROCEDURE — 1160F PR REVIEW ALL MEDS BY PRESCRIBER/CLIN PHARMACIST DOCUMENTED: ICD-10-PCS | Mod: CPTII,S$GLB,, | Performed by: OPHTHALMOLOGY

## 2023-03-14 PROCEDURE — 1159F PR MEDICATION LIST DOCUMENTED IN MEDICAL RECORD: ICD-10-PCS | Mod: CPTII,S$GLB,, | Performed by: OPHTHALMOLOGY

## 2023-03-14 PROCEDURE — 1126F PR PAIN SEVERITY QUANTIFIED, NO PAIN PRESENT: ICD-10-PCS | Mod: CPTII,S$GLB,, | Performed by: OPHTHALMOLOGY

## 2023-03-14 PROCEDURE — 99204 PR OFFICE/OUTPT VISIT, NEW, LEVL IV, 45-59 MIN: ICD-10-PCS | Mod: 57,S$GLB,, | Performed by: OPHTHALMOLOGY

## 2023-03-14 PROCEDURE — 99999 PR PBB SHADOW E&M-EST. PATIENT-LVL III: CPT | Mod: PBBFAC,,, | Performed by: OPHTHALMOLOGY

## 2023-03-14 PROCEDURE — 3288F FALL RISK ASSESSMENT DOCD: CPT | Mod: CPTII,S$GLB,, | Performed by: OPHTHALMOLOGY

## 2023-03-14 PROCEDURE — 1101F PR PT FALLS ASSESS DOC 0-1 FALLS W/OUT INJ PAST YR: ICD-10-PCS | Mod: CPTII,S$GLB,, | Performed by: OPHTHALMOLOGY

## 2023-03-14 NOTE — PROGRESS NOTES
HPI    DLS:       Pt states she has noticed some changes in her vision. OS has a black   [place] in her vision OS. States not sure how long it has been there.   Last edited by Hannah Dowd on 3/14/2023  2:46 PM.            Assessment /Plan     For exam results, see Encounter Report.    Posterior capsular opacification, left eye  -     Ambulatory referral/consult to Ophthalmology      Discussed condition with patient  RBA of procedure, pt agreeable    Yag CAP OS today  No complications    F/u 1-2 months, dilate OU, yag OD?

## 2023-03-15 ENCOUNTER — TELEPHONE (OUTPATIENT)
Dept: FAMILY MEDICINE | Facility: CLINIC | Age: 73
End: 2023-03-15
Payer: MEDICARE

## 2023-03-15 DIAGNOSIS — U07.1 COVID-19: Primary | ICD-10-CM

## 2023-03-15 NOTE — TELEPHONE ENCOUNTER
----- Message from Paula Melton sent at 3/15/2023 11:08 AM CDT -----  Regarding: advise  Contact: patient  Type: Needs Medical Advice  Who Called:  patient  Symptoms (please be specific):  tested positive for Covid  How long has patient had these symptoms:  03/14/23  Pharmacy name and phone #:    [facility] - [place], [place] - [address] Replaced by [facility] [phone] Replaced by [facility] [phone]  [place] [address]  Phone: [phone] Fax: [phone]      Best Call Back Number: [phone] (home)   Additional Information: Patient would like a prescription of Paxlovid.  Please call patient to advise.  Thanks!

## 2023-03-15 NOTE — TELEPHONE ENCOUNTER
----- Message from Brooklyn Diop sent at 3/15/2023  3:03 PM CDT -----  Contact: Jahaira silver/[facility]  Type:  Pharmacy Calling to Clarify an RX    Name of Caller: Jahaira silver/ [facility]  Pharmacy Name:    [facility]/pharmacy #[id number] - [place], [place] - [address]  [address]  [place] [address]  Phone: [phone] Fax: [phone]    Prescription Name:Paxlovid 150 mg/ 2 tablets  What do they need to clarify?:d Pharmacy doesn't dispense this medication  Best Call Back Number:[phone]  Additional Information: [facility] doesn't dispense the Paxlovid, and would like the prescription to be sent to [facility] on [address].

## 2023-03-15 NOTE — TELEPHONE ENCOUNTER
Call placed to patient for notification prescription sent to pharmacy, also notified of Dr. Saldaña's recommendations. Patient verbalized understanding.        Surinder Saldaña MD  You 1 minute ago (2:50 PM)       Rx sent.     Stay hydrated. Get up and move regularly to keep the blood flowing to the legs.

## 2023-03-15 NOTE — TELEPHONE ENCOUNTER
Patient reports testing positive for Covid per home test kit on yesterday 3-14-23.      Reports experiencing nasal congestion, headache, body aches, dry throat, coughing up yellow mucous, and production of yellow mucous when blowing nose.      Symptoms started on yesterday.      Patient is currently taking the following for symptoms: Zyrtec x's 1 on yesterday, nothing today    Patient is requesting prescription for Paxlovid.Preferred pharmacy is [facility] in [place]. Please advise. Thank you.

## 2023-04-06 ENCOUNTER — OFFICE VISIT (OUTPATIENT)
Dept: DERMATOLOGY | Facility: CLINIC | Age: 73
End: 2023-04-06
Payer: MEDICARE

## 2023-04-06 VITALS — WEIGHT: 179 LBS | BODY MASS INDEX: 32.94 KG/M2 | HEIGHT: 62 IN

## 2023-04-06 DIAGNOSIS — D48.5 NEOPLASM OF UNCERTAIN BEHAVIOR OF SKIN: Primary | ICD-10-CM

## 2023-04-06 PROCEDURE — 99499 UNLISTED E&M SERVICE: CPT | Mod: S$GLB,,, | Performed by: STUDENT IN AN ORGANIZED HEALTH CARE EDUCATION/TRAINING PROGRAM

## 2023-04-06 PROCEDURE — 3008F BODY MASS INDEX DOCD: CPT | Mod: CPTII,S$GLB,, | Performed by: STUDENT IN AN ORGANIZED HEALTH CARE EDUCATION/TRAINING PROGRAM

## 2023-04-06 PROCEDURE — 1160F PR REVIEW ALL MEDS BY PRESCRIBER/CLIN PHARMACIST DOCUMENTED: ICD-10-PCS | Mod: CPTII,S$GLB,, | Performed by: STUDENT IN AN ORGANIZED HEALTH CARE EDUCATION/TRAINING PROGRAM

## 2023-04-06 PROCEDURE — 1159F PR MEDICATION LIST DOCUMENTED IN MEDICAL RECORD: ICD-10-PCS | Mod: CPTII,S$GLB,, | Performed by: STUDENT IN AN ORGANIZED HEALTH CARE EDUCATION/TRAINING PROGRAM

## 2023-04-06 PROCEDURE — 3288F FALL RISK ASSESSMENT DOCD: CPT | Mod: CPTII,S$GLB,, | Performed by: STUDENT IN AN ORGANIZED HEALTH CARE EDUCATION/TRAINING PROGRAM

## 2023-04-06 PROCEDURE — 99999 PR PBB SHADOW E&M-EST. PATIENT-LVL III: ICD-10-PCS | Mod: PBBFAC,,, | Performed by: STUDENT IN AN ORGANIZED HEALTH CARE EDUCATION/TRAINING PROGRAM

## 2023-04-06 PROCEDURE — 1101F PR PT FALLS ASSESS DOC 0-1 FALLS W/OUT INJ PAST YR: ICD-10-PCS | Mod: CPTII,S$GLB,, | Performed by: STUDENT IN AN ORGANIZED HEALTH CARE EDUCATION/TRAINING PROGRAM

## 2023-04-06 PROCEDURE — 3008F PR BODY MASS INDEX (BMI) DOCUMENTED: ICD-10-PCS | Mod: CPTII,S$GLB,, | Performed by: STUDENT IN AN ORGANIZED HEALTH CARE EDUCATION/TRAINING PROGRAM

## 2023-04-06 PROCEDURE — 11102 TANGNTL BX SKIN SINGLE LES: CPT | Mod: S$GLB,,, | Performed by: STUDENT IN AN ORGANIZED HEALTH CARE EDUCATION/TRAINING PROGRAM

## 2023-04-06 PROCEDURE — 1126F PR PAIN SEVERITY QUANTIFIED, NO PAIN PRESENT: ICD-10-PCS | Mod: CPTII,S$GLB,, | Performed by: STUDENT IN AN ORGANIZED HEALTH CARE EDUCATION/TRAINING PROGRAM

## 2023-04-06 PROCEDURE — 11102 PR TANGENTIAL BIOPSY, SKIN, SINGLE LESION: ICD-10-PCS | Mod: S$GLB,,, | Performed by: STUDENT IN AN ORGANIZED HEALTH CARE EDUCATION/TRAINING PROGRAM

## 2023-04-06 PROCEDURE — 1160F RVW MEDS BY RX/DR IN RCRD: CPT | Mod: CPTII,S$GLB,, | Performed by: STUDENT IN AN ORGANIZED HEALTH CARE EDUCATION/TRAINING PROGRAM

## 2023-04-06 PROCEDURE — 1157F ADVNC CARE PLAN IN RCRD: CPT | Mod: CPTII,S$GLB,, | Performed by: STUDENT IN AN ORGANIZED HEALTH CARE EDUCATION/TRAINING PROGRAM

## 2023-04-06 PROCEDURE — 88305 TISSUE EXAM BY PATHOLOGIST: CPT | Mod: 26,,, | Performed by: DERMATOLOGY

## 2023-04-06 PROCEDURE — 99999 PR PBB SHADOW E&M-EST. PATIENT-LVL III: CPT | Mod: PBBFAC,,, | Performed by: STUDENT IN AN ORGANIZED HEALTH CARE EDUCATION/TRAINING PROGRAM

## 2023-04-06 PROCEDURE — 3288F PR FALLS RISK ASSESSMENT DOCUMENTED: ICD-10-PCS | Mod: CPTII,S$GLB,, | Performed by: STUDENT IN AN ORGANIZED HEALTH CARE EDUCATION/TRAINING PROGRAM

## 2023-04-06 PROCEDURE — 99499 NO LOS: ICD-10-PCS | Mod: S$GLB,,, | Performed by: STUDENT IN AN ORGANIZED HEALTH CARE EDUCATION/TRAINING PROGRAM

## 2023-04-06 PROCEDURE — 1101F PT FALLS ASSESS-DOCD LE1/YR: CPT | Mod: CPTII,S$GLB,, | Performed by: STUDENT IN AN ORGANIZED HEALTH CARE EDUCATION/TRAINING PROGRAM

## 2023-04-06 PROCEDURE — 88305 TISSUE EXAM BY PATHOLOGIST: ICD-10-PCS | Mod: 26,,, | Performed by: DERMATOLOGY

## 2023-04-06 PROCEDURE — 1157F PR ADVANCE CARE PLAN OR EQUIV PRESENT IN MEDICAL RECORD: ICD-10-PCS | Mod: CPTII,S$GLB,, | Performed by: STUDENT IN AN ORGANIZED HEALTH CARE EDUCATION/TRAINING PROGRAM

## 2023-04-06 PROCEDURE — 1126F AMNT PAIN NOTED NONE PRSNT: CPT | Mod: CPTII,S$GLB,, | Performed by: STUDENT IN AN ORGANIZED HEALTH CARE EDUCATION/TRAINING PROGRAM

## 2023-04-06 PROCEDURE — 1159F MED LIST DOCD IN RCRD: CPT | Mod: CPTII,S$GLB,, | Performed by: STUDENT IN AN ORGANIZED HEALTH CARE EDUCATION/TRAINING PROGRAM

## 2023-04-06 PROCEDURE — 88305 TISSUE EXAM BY PATHOLOGIST: CPT | Performed by: DERMATOLOGY

## 2023-04-06 NOTE — PATIENT INSTRUCTIONS
Shave Biopsy Wound Care    Your doctor has performed a shave biopsy today.  A band aid and vaseline ointment has been placed over the site.  This should remain in place for 24 hours.  It is recommended that you keep the area dry for the first 24 hours.  After 24 hours, you may remove the band aid and wash the area with warm soap and water and apply Vaseline jelly.  Many patients prefer to use Neosporin or Bacitracin ointment.  This is acceptable; however, know that you can develop an allergy to this medication even if you have used it safely for years.  It is important to keep the area moist.  Letting it dry out and get air slows healing time, and will worsen the scar.  Band aid is optional after first 24 hours.      If you notice increasing redness, tenderness, pain, or yellow drainage at the biopsy site, please notify your doctor.  These are signs of an infection.    If your biopsy site is bleeding, apply firm pressure for 15 minutes straight.  Repeat for another 15 minutes, if it is still bleeding.   If the surgical site continues to bleed, then please contact your doctor.      [facility] [place] [address]/ [phone] FAX/ [email or web address]

## 2023-04-06 NOTE — PROGRESS NOTES
"  Subjective:      Patient ID:  Syeda Gonsalves is a 72 y.o. female who presents for   Chief Complaint   Patient presents with    Spot     Under right forearm     LOV- 2/21/22    Here today for a lesion under her right forearm that she says was "frozen" at the last visit and did not go away and has grown  She was told at the last visit that if it does not go away that she should return    Has no hx of NMSC  Has no fhx of MM      Review of Systems   Constitutional:  Negative for fever and chills.   Respiratory:  Negative for cough and shortness of breath.    Gastrointestinal:  Negative for nausea and vomiting.   Skin:  Positive for daily sunscreen use. Negative for itching, rash, dry skin, activity-related sunscreen use and wears hat.   Hematologic/Lymphatic: Does not bruise/bleed easily.     Objective:   Physical Exam   Skin:   Areas Examined (abnormalities noted in diagram):   RUE Inspected          Diagram Legend     Erythematous scaling macule/papule c/w actinic keratosis       Vascular papule c/w angioma      Pigmented verrucoid papule/plaque c/w seborrheic keratosis      Yellow umbilicated papule c/w sebaceous hyperplasia      Irregularly shaped tan macule c/w lentigo     1-2 mm smooth white papules consistent with Milia      Movable subcutaneous cyst with punctum c/w epidermal inclusion cyst      Subcutaneous movable cyst c/w pilar cyst      Firm pink to brown papule c/w dermatofibroma      Pedunculated fleshy papule(s) c/w skin tag(s)      Evenly pigmented macule c/w junctional nevus     Mildly variegated pigmented, slightly irregular-bordered macule c/w mildly atypical nevus      Flesh colored to evenly pigmented papule c/w intradermal nevus       Pink pearly papule/plaque c/w basal cell carcinoma      Erythematous hyperkeratotic cursted plaque c/w SCC      Surgical scar with no sign of skin cancer recurrence      Open and closed comedones      Inflammatory papules and pustules      Verrucoid papule " consistent consistent with wart     Erythematous eczematous patches and plaques     Dystrophic onycholytic nail with subungual debris c/w onychomycosis     Umbilicated papule    Erythematous-base heme-crusted tan verrucoid plaque consistent with inflamed seborrheic keratosis     Erythematous Silvery Scaling Plaque c/w Psoriasis     See annotation        Assessment / Plan:      Pathology Orders:       Normal Orders This Visit    Specimen to Pathology, Dermatology     Questions:    Procedure Type: Dermatology and skin neoplasms    Number of Specimens: 1    ------------------------: -------------------------    Spec 1 Procedure: Biopsy    Spec 1 Clinical Impression: wart vs. ISK vs. SCC    Spec 1 Source: right forearm    Release to patient:           Neoplasm of uncertain behavior of skin  -     Specimen to Pathology, Dermatology  Shave biopsy procedure note:    Shave biopsy performed after verbal consent including risk of infection, scar, recurrence, need for additional treatment of site. Area prepped with alcohol, anesthetized with approximately 1.0cc of 1% lidocaine with epinephrine. Lesional tissue shaved with razor blade. Hemostasis achieved with application of aluminum chloride followed by hyfrecation. No complications. Dressing applied. Wound care explained.             No follow-ups on file.

## 2023-04-17 ENCOUNTER — PATIENT OUTREACH (OUTPATIENT)
Dept: ADMINISTRATIVE | Facility: OTHER | Age: 73
End: 2023-04-17
Payer: MEDICARE

## 2023-04-17 VITALS — DIASTOLIC BLOOD PRESSURE: 66 MMHG | HEART RATE: 72 BPM | SYSTOLIC BLOOD PRESSURE: 124 MMHG | OXYGEN SATURATION: 95 %

## 2023-04-17 LAB
FINAL PATHOLOGIC DIAGNOSIS: NORMAL
GROSS: NORMAL
Lab: NORMAL
MICROSCOPIC EXAM: NORMAL

## 2023-04-19 ENCOUNTER — TELEPHONE (OUTPATIENT)
Dept: OPHTHALMOLOGY | Facility: CLINIC | Age: 73
End: 2023-04-19
Payer: MEDICARE

## 2023-04-19 NOTE — TELEPHONE ENCOUNTER
Spoke to pt and rescheduled appt   ----- Message from Rosie Salgado sent at 4/19/2023 11:51 AM CDT -----  Mary Jo King, Patient Care Assistant  [facility]  Caller: pt (Today, 11:44 AM)  Type: Needs Medical Advice     Who Called:  pt     Best Call Back Number: [phone] (home)     Additional Information: pt states she would like a callback regarding rescheduling her appointment on 4/25/23 on a Monday instead.  Thanks!

## 2023-04-19 NOTE — PATIENT INSTRUCTIONS

## 2023-04-25 ENCOUNTER — OFFICE VISIT (OUTPATIENT)
Dept: PODIATRY | Facility: CLINIC | Age: 73
End: 2023-04-25
Payer: MEDICARE

## 2023-04-25 VITALS — WEIGHT: 175 LBS | HEIGHT: 62 IN | RESPIRATION RATE: 20 BRPM | BODY MASS INDEX: 32.2 KG/M2

## 2023-04-25 DIAGNOSIS — M79.672 PAIN OF LEFT HEEL: ICD-10-CM

## 2023-04-25 DIAGNOSIS — M72.2 PLANTAR FASCIITIS: Primary | ICD-10-CM

## 2023-04-25 PROCEDURE — 20550 NJX 1 TENDON SHEATH/LIGAMENT: CPT | Mod: LT,S$GLB,, | Performed by: PODIATRIST

## 2023-04-25 PROCEDURE — 1160F RVW MEDS BY RX/DR IN RCRD: CPT | Mod: CPTII,S$GLB,, | Performed by: PODIATRIST

## 2023-04-25 PROCEDURE — 1101F PT FALLS ASSESS-DOCD LE1/YR: CPT | Mod: CPTII,S$GLB,, | Performed by: PODIATRIST

## 2023-04-25 PROCEDURE — 3008F BODY MASS INDEX DOCD: CPT | Mod: CPTII,S$GLB,, | Performed by: PODIATRIST

## 2023-04-25 PROCEDURE — 3288F PR FALLS RISK ASSESSMENT DOCUMENTED: ICD-10-PCS | Mod: CPTII,S$GLB,, | Performed by: PODIATRIST

## 2023-04-25 PROCEDURE — 99213 OFFICE O/P EST LOW 20 MIN: CPT | Mod: 25,S$GLB,, | Performed by: PODIATRIST

## 2023-04-25 PROCEDURE — 1160F PR REVIEW ALL MEDS BY PRESCRIBER/CLIN PHARMACIST DOCUMENTED: ICD-10-PCS | Mod: CPTII,S$GLB,, | Performed by: PODIATRIST

## 2023-04-25 PROCEDURE — 1157F ADVNC CARE PLAN IN RCRD: CPT | Mod: CPTII,S$GLB,, | Performed by: PODIATRIST

## 2023-04-25 PROCEDURE — 20550 PR INJECT TENDON SHEATH/LIGAMENT: ICD-10-PCS | Mod: LT,S$GLB,, | Performed by: PODIATRIST

## 2023-04-25 PROCEDURE — 99213 PR OFFICE/OUTPT VISIT, EST, LEVL III, 20-29 MIN: ICD-10-PCS | Mod: 25,S$GLB,, | Performed by: PODIATRIST

## 2023-04-25 PROCEDURE — 1125F AMNT PAIN NOTED PAIN PRSNT: CPT | Mod: CPTII,S$GLB,, | Performed by: PODIATRIST

## 2023-04-25 PROCEDURE — 1157F PR ADVANCE CARE PLAN OR EQUIV PRESENT IN MEDICAL RECORD: ICD-10-PCS | Mod: CPTII,S$GLB,, | Performed by: PODIATRIST

## 2023-04-25 PROCEDURE — 3288F FALL RISK ASSESSMENT DOCD: CPT | Mod: CPTII,S$GLB,, | Performed by: PODIATRIST

## 2023-04-25 PROCEDURE — 1125F PR PAIN SEVERITY QUANTIFIED, PAIN PRESENT: ICD-10-PCS | Mod: CPTII,S$GLB,, | Performed by: PODIATRIST

## 2023-04-25 PROCEDURE — 1159F MED LIST DOCD IN RCRD: CPT | Mod: CPTII,S$GLB,, | Performed by: PODIATRIST

## 2023-04-25 PROCEDURE — 3008F PR BODY MASS INDEX (BMI) DOCUMENTED: ICD-10-PCS | Mod: CPTII,S$GLB,, | Performed by: PODIATRIST

## 2023-04-25 PROCEDURE — 1101F PR PT FALLS ASSESS DOC 0-1 FALLS W/OUT INJ PAST YR: ICD-10-PCS | Mod: CPTII,S$GLB,, | Performed by: PODIATRIST

## 2023-04-25 PROCEDURE — 1159F PR MEDICATION LIST DOCUMENTED IN MEDICAL RECORD: ICD-10-PCS | Mod: CPTII,S$GLB,, | Performed by: PODIATRIST

## 2023-04-25 RX ORDER — BUPIVACAINE HYDROCHLORIDE 5 MG/ML
1.5 INJECTION, SOLUTION PERINEURAL
Status: COMPLETED | OUTPATIENT
Start: 2023-04-25 | End: 2023-04-25

## 2023-04-25 RX ORDER — METHYLPREDNISOLONE ACETATE 40 MG/ML
20 INJECTION, SUSPENSION INTRA-ARTICULAR; INTRALESIONAL; INTRAMUSCULAR; SOFT TISSUE
Status: COMPLETED | OUTPATIENT
Start: 2023-04-25 | End: 2023-04-25

## 2023-04-25 RX ORDER — DEXAMETHASONE SODIUM PHOSPHATE 4 MG/ML
4 INJECTION, SOLUTION INTRA-ARTICULAR; INTRALESIONAL; INTRAMUSCULAR; INTRAVENOUS; SOFT TISSUE
Status: COMPLETED | OUTPATIENT
Start: 2023-04-25 | End: 2023-04-25

## 2023-04-25 RX ADMIN — METHYLPREDNISOLONE ACETATE 20 MG: 40 INJECTION, SUSPENSION INTRA-ARTICULAR; INTRALESIONAL; INTRAMUSCULAR; SOFT TISSUE at 09:04

## 2023-04-25 RX ADMIN — DEXAMETHASONE SODIUM PHOSPHATE 4 MG: 4 INJECTION, SOLUTION INTRA-ARTICULAR; INTRALESIONAL; INTRAMUSCULAR; INTRAVENOUS; SOFT TISSUE at 09:04

## 2023-04-25 RX ADMIN — BUPIVACAINE HYDROCHLORIDE 7.5 MG: 5 INJECTION, SOLUTION PERINEURAL at 09:04

## 2023-04-25 NOTE — PROGRESS NOTES
"  [address] [facility] [place] [place] [address]  Phone: [phone]   [phone]    Patient's PCP:Surinder Saldaña MD  Referring Provider: No ref. provider found    Subjective:      Chief Complaint:: Follow-up (Left foot plantar fasciitis )    TATA Gonsalves is a 72 y.o. female who presents today with a complaint of left foot plantar fasciitis.  Current symptoms include pain aching to burning.  Aggravating factors are walkingand prolonged use. Symptoms have progressed. Treatment to date have included Icy hot, lidocaine, glucosamine and Tylenol.    Vitals:    04/25/23 0854   Resp: 20   Weight: 79.4 kg (175 lb)   Height: 5' 2" (1.575 m)   PainSc:   8      Shoe Size: 9    Past Surgical History:   Procedure Laterality Date    ADENOIDECTOMY      ADRENALECTOMY      CARPAL TUNNEL RELEASE      CHOLECYSTECTOMY      COLONOSCOPY N/A 12/21/2017    Procedure: COLONOSCOPY;  Surgeon: Harriett Pereyra MD;  Location: [facility] ENDO;  Service: Endoscopy;  Laterality: N/A;    ESOPHAGOGASTRODUODENOSCOPY N/A 07/18/2018    Procedure: EGD (ESOPHAGOGASTRODUODENOSCOPY);  Surgeon: Harriett Pereyra MD;  Location: [facility] ENDO;  Service: Endoscopy;  Laterality: N/A;    EYE SURGERY      HYSTERECTOMY      OOPHORECTOMY      PERCUTANEOUS TENOTOMY Right 07/27/2022    Procedure: TENOTOMY, PERCUTANEOUS;  Surgeon: Doris Galan DO;  Location: [facility] OR;  Service: Orthopedics;  Laterality: Right;    TONSILLECTOMY      UPPER GASTROINTESTINAL ENDOSCOPY       Past Medical History:   Diagnosis Date    Abnormal Pap smear 1990    Allergy to statin medication     Anxiety     Arthritis     Asthma     Back pain     Cervical cancer     Depression     GERD (gastroesophageal reflux disease)     H/O head and neck radiation 01/11/2017    Hyperlipidemia     Kidney insufficiency     Multinodular goiter 01/11/2017    Obesity     Smoking 01/11/2017    Uterine cancer      Family History   Problem Relation Age of Onset    Macular degeneration Mother  "    Heart disease Mother         MI    Heart disease Father         MI    Macular degeneration Brother     Hyperlipidemia Brother     ANTHONY disease Maternal Grandmother     Bladder Cancer Paternal Grandmother     Heart disease Paternal Grandfather         MI    ANTHONY disease Daughter     Diabetes Son     Breast cancer Neg Hx     Colon cancer Neg Hx     Ovarian cancer Neg Hx     Cancer Neg Hx         Social History:   Marital Status:   Alcohol History:  reports no history of alcohol use.  Tobacco History:  reports that she has been smoking cigarettes. She has a 5.00 pack-year smoking history. She has never used smokeless tobacco.  Drug History:  reports no history of drug use.    Review of patient's allergies indicates:   Allergen Reactions    Flu vaccine 2011 (36 mos+)(pf) Anaphylaxis    Iodine and iodide containing products     Lipitor [atorvastatin]     Niacin preparations     Statins-hmg-coa reductase inhibitors Hives    Welchol [colesevelam] Other (See Comments)     GI upset and insomnia    Zetia [ezetimibe] Nausea And Vomiting       Current Outpatient Medications   Medication Sig Dispense Refill    amitriptyline (ELAVIL) 10 MG tablet Take 1 tablet (10 mg total) by mouth nightly as needed for Insomnia. 30 tablet 1    clindamycin phosphate 1% (CLINDAGEL) 1 % gel Apply topically 2 (two) times daily. 60 g 3    ergocalciferol (ERGOCALCIFEROL) 50,000 unit Cap Take 1 capsule (50,000 Units total) by mouth every 7 days. 12 capsule 3    fluocinolone acetonide oiL 0.01 % Drop 3-4 drops to the affected ear(s) twice daily no more than a week at a time as needed for itching and scaling 20 mL 0    fluticasone propionate (FLONASE) 50 mcg/actuation nasal spray 1 spray (50 mcg total) by Each Nostril route once daily. 15.8 mL 0    gabapentin (NEURONTIN) 300 MG capsule 1 p.o. in the morning 1 p.o. in the afternoon and 2 p.o. at night 120 capsule 11    GARLIC (GARLIQUE ORAL) Take 1 capsule by mouth once daily.      magnesium  oxide (MAG-OX) 400 mg (241.3 mg magnesium) tablet Take 400 mg by mouth once daily.      MULTIVITAMIN (MULTIPLE VITAMINS ORAL) Take by mouth.      ondansetron (ZOFRAN-ODT) 4 MG TbDL Take 1 tablet (4 mg total) by mouth every 8 (eight) hours as needed (nausea, vomiting). 12 tablet 0    red yeast rice 600 mg Cap Take 2 capsules by mouth every evening.      sertraline (ZOLOFT) 100 MG tablet Take 1.5 tablets by mouth once daily. 125mg daily      sertraline (ZOLOFT) 25 MG tablet Take 50 mg by mouth once daily. Takes 125 mg daily       No current facility-administered medications for this visit.       Review of Systems   Constitutional:  Negative for chills, fatigue, fever and unexpected weight change.   HENT:  Negative for hearing loss and trouble swallowing.    Eyes:  Negative for photophobia and visual disturbance.   Respiratory:  Negative for cough, shortness of breath and wheezing.    Cardiovascular:  Negative for chest pain, palpitations and leg swelling.   Gastrointestinal:  Negative for abdominal pain and nausea.   Genitourinary:  Negative for dysuria and frequency.   Musculoskeletal:  Positive for myalgias. Negative for arthralgias, back pain, gait problem and joint swelling.   Skin:  Negative for rash and wound.   Neurological:  Negative for tremors, seizures, weakness, numbness and headaches.   Hematological:  Does not bruise/bleed easily.       Objective:        Physical Exam:   Foot Exam    General  General Appearance: appears stated age and healthy   Orientation: alert and oriented to person, place, and time   Affect: appropriate   Gait: unimpaired       Left Foot/Ankle      Inspection and Palpation  Ecchymosis: none  Tenderness: plantar fascia   Swelling: none   Arch: normal  Hammertoes: absent  Claw toes: absent  Hallux valgus: no  Hallux limitus: no  Skin Exam: skin intact; no drainage, no ulcer and no erythema   Neurovascular  Dorsalis pedis: 2+  Posterior tibial: 2+  Capillary refill: 2+  Varicose veins:  not present  Saphenous nerve sensation: normal  Tibial nerve sensation: normal  Superficial peroneal nerve sensation: normal  Deep peroneal nerve sensation: normal  Sural nerve sensation: normal    Muscle Strength  Ankle dorsiflexion: 5  Ankle plantar flexion: 5  Ankle inversion: 5  Ankle eversion: 5  Great toe extension: 5  Great toe flexion: 5    Range of Motion    Normal left ankle ROM    Tests  Anterior drawer: negative   Talar tilt: negative   PT Tinel's sign: negative  Paresthesia: negative  Comments  Pain on palpation of the infeior medial heel and central plantar heel. No pain present  with side to side compression of the calcaneus. Negative tinnel's sign  at the tarsal tunnel. Negative Aguilera's nerve pain. Negative Calcaneal nerve pain. No soft tissue masses. Pain absent  with dorsiflexion of the ankle. No edema, erythema, or ecchymosis noted.     Physical Exam  Cardiovascular:      Pulses:           Dorsalis pedis pulses are 2+ on the left side.        Posterior tibial pulses are 2+ on the left side.   Musculoskeletal:      Left foot: No bunion.   Feet:      Left foot:      Skin integrity: No ulcer or erythema.             Left Ankle/Foot Exam     Range of Motion   The patient has normal left ankle ROM.     Comments:  Pain on palpation of the infeior medial heel and central plantar heel. No pain present  with side to side compression of the calcaneus. Negative tinnel's sign  at the tarsal tunnel. Negative Aguilera's nerve pain. Negative Calcaneal nerve pain. No soft tissue masses. Pain absent  with dorsiflexion of the ankle. No edema, erythema, or ecchymosis noted.         Muscle Strength   Left Lower Extremity   Ankle Dorsiflexion:  5   Plantar flexion:  5/5     Vascular Exam       Left Pulses  Dorsalis Pedis:      2+  Posterior Tibial:      2+         Imaging: none            Assessment:       1. Plantar fasciitis    2. Pain of left heel      Plan:   Plantar fasciitis  -     methylPREDNISolone acetate  injection 20 mg  -     BUPivacaine injection 7.5 mg  -     dexAMETHasone injection 4 mg    Pain of left heel  -     methylPREDNISolone acetate injection 20 mg  -     BUPivacaine injection 7.5 mg  -     dexAMETHasone injection 4 mg      Follow up if symptoms worsen or fail to improve.    Procedures Informed consent was obtained.  Time-out was called.  The area was prepped with alcohol, and a steroid injection was performed at left plantar fascia using 1.5 cc of 0.5% Marcaine w/out epi, 1 cc Dexamethasone, 0.5 cc Methylprednisolone. Patient tolerated the procedure well.         Plantar Fasciitis Level I Treatment:   Anti-inflammatory  No bare feet  Over the counter insert  Restrict activity level   Use running shoes at full time  No impact exercise and stretch gastrocnemius muscle      Counseling:     I provided patient education verbally regarding:   Patient diagnosis, treatment options, as well as alternatives, risks, and benefits.     Discussed different treatment options for heel pain. I gave written and verbal instructions on heel cord stretching and this was demonstrated for the patient. Patient expressed understanding. Discussed wearing appropriate shoe gear and avoiding flats, slippers, sandals, barefoot, and sockfeet. Recommended arch supports. My recommendation for OTC supports is Spenco polysorb replacement insoles or patient may elect more aggressive treatment with prescription arch supports. We also discussed cortisone injections and NSAID therapy.       This note was created using Dragon voice recognition software that occasionally misinterpreted phrases or words.

## 2023-04-27 ENCOUNTER — OFFICE VISIT (OUTPATIENT)
Dept: URGENT CARE | Facility: CLINIC | Age: 73
End: 2023-04-27
Payer: MEDICARE

## 2023-04-27 VITALS
SYSTOLIC BLOOD PRESSURE: 121 MMHG | HEIGHT: 62 IN | TEMPERATURE: 97 F | OXYGEN SATURATION: 97 % | DIASTOLIC BLOOD PRESSURE: 64 MMHG | BODY MASS INDEX: 33.49 KG/M2 | WEIGHT: 182 LBS | RESPIRATION RATE: 20 BRPM | HEART RATE: 65 BPM

## 2023-04-27 DIAGNOSIS — R05.9 COUGH, UNSPECIFIED TYPE: Primary | ICD-10-CM

## 2023-04-27 DIAGNOSIS — J22 LOWER RESPIRATORY INFECTION: ICD-10-CM

## 2023-04-27 LAB
CTP QC/QA: YES
CTP QC/QA: YES
FLUAV AG NPH QL: NEGATIVE
FLUBV AG NPH QL: NEGATIVE
S PYO RRNA THROAT QL PROBE: NEGATIVE

## 2023-04-27 PROCEDURE — 87804 POCT INFLUENZA A/B: ICD-10-PCS | Mod: QW,,, | Performed by: NURSE PRACTITIONER

## 2023-04-27 PROCEDURE — 87804 INFLUENZA ASSAY W/OPTIC: CPT | Mod: QW,,, | Performed by: NURSE PRACTITIONER

## 2023-04-27 PROCEDURE — 99214 OFFICE O/P EST MOD 30 MIN: CPT | Mod: S$GLB,,, | Performed by: NURSE PRACTITIONER

## 2023-04-27 PROCEDURE — 87880 STREP A ASSAY W/OPTIC: CPT | Mod: QW,,, | Performed by: NURSE PRACTITIONER

## 2023-04-27 PROCEDURE — 87880 POCT RAPID STREP A: ICD-10-PCS | Mod: QW,,, | Performed by: NURSE PRACTITIONER

## 2023-04-27 PROCEDURE — 99214 PR OFFICE/OUTPT VISIT, EST, LEVL IV, 30-39 MIN: ICD-10-PCS | Mod: S$GLB,,, | Performed by: NURSE PRACTITIONER

## 2023-04-27 RX ORDER — AMOXICILLIN AND CLAVULANATE POTASSIUM 875; 125 MG/1; MG/1
1 TABLET, FILM COATED ORAL EVERY 12 HOURS
Qty: 14 TABLET | Refills: 0 | Status: SHIPPED | OUTPATIENT
Start: 2023-04-27 | End: 2023-05-04

## 2023-04-27 RX ORDER — ALBUTEROL SULFATE 90 UG/1
2 AEROSOL, METERED RESPIRATORY (INHALATION) EVERY 6 HOURS PRN
Qty: 18 G | Refills: 0 | Status: SHIPPED | OUTPATIENT
Start: 2023-04-27

## 2023-04-28 NOTE — PROGRESS NOTES
"Subjective:      Patient ID: Syeda Gonsalves is a 72 y.o. female.    Vitals:  height is 5' 2" (1.575 m) and weight is 82.6 kg (182 lb). Her temperature is 97.1 °F (36.2 °C). Her blood pressure is 121/64 and her pulse is 65. Her respiration is 20 and oxygen saturation is 97%.     Chief Complaint: Cough    Cough  This is a new problem. The current episode started in the past 7 days (x's 5 days). The cough is Productive of sputum. Associated symptoms include ear pain, headaches, nasal congestion, a sore throat and wheezing. Pertinent negatives include no fever. Treatments tried: zyrtec. The treatment provided no relief. Her past medical history is significant for bronchitis.     Constitution: Negative for fever.   HENT:  Positive for ear pain, congestion and sore throat.    Respiratory:  Positive for chest tightness, cough and wheezing.    Neurological:  Positive for headaches.    Objective:     Physical Exam   Constitutional: She is oriented to person, place, and time. She is cooperative.  Non-toxic appearance. She does not appear ill. No distress. awake  HENT:   Head: Normocephalic and atraumatic.   Ears:   Right Ear: Tympanic membrane, external ear and ear canal normal.   Left Ear: Tympanic membrane, external ear and ear canal normal.   Nose: Rhinorrhea and congestion present.   Mouth/Throat: Mucous membranes are moist. Posterior oropharyngeal erythema present. No oropharyngeal exudate.   Eyes: Conjunctivae are normal. Right eye exhibits no discharge. Left eye exhibits no discharge.   Neck: Neck supple. No neck rigidity present.   Cardiovascular: Normal rate, regular rhythm and normal heart sounds.   Pulmonary/Chest: Effort normal and breath sounds normal. No accessory muscle usage. No tachypnea. No respiratory distress. She has no wheezes. She has no rhonchi. She has no rales. She exhibits no tenderness.   Abdominal: Normal appearance.   Musculoskeletal:      Cervical back: She exhibits no tenderness. "   Lymphadenopathy:     She has no cervical adenopathy.   Neurological: no focal deficit. She is alert and oriented to person, place, and time. No sensory deficit.   Skin: Skin is warm, dry, not diaphoretic and no rash. Capillary refill takes 2 to 3 seconds.   Psychiatric: Her behavior is normal. Mood normal.   Nursing note and vitals reviewed.    Assessment:     1. Cough, unspecified type    2. Lower respiratory infection      Strep a negative  Flu a/b neg    Plan:       Cough, unspecified type  -     POCT rapid strep A  -     POCT Influenza A/B Rapid Antigen    Lower respiratory infection  -     amoxicillin-clavulanate 875-125mg (AUGMENTIN) 875-125 mg per tablet; Take 1 tablet by mouth every 12 (twelve) hours. for 7 days  Dispense: 14 tablet; Refill: 0  -     albuterol (VENTOLIN HFA) 90 mcg/actuation inhaler; Inhale 2 puffs into the lungs every 6 (six) hours as needed for Wheezing. Rescue  Dispense: 18 g; Refill: 0

## 2023-04-28 NOTE — PATIENT INSTRUCTIONS
Augmentin twice a day for 7 days    Albuterol inhaler every 4-6 hours while awake for the next 3 days then just as needed for persistent cough, shortness of breath, wheezing, chest tightness.    Cough and deep breathing exercises every 1-2 hours while awake until symptoms are improving then as needed.    Increase fluid intake significantly to help thin secretions.    Guaifenesin 1200 mg twice a day over-the-counter for 10 days.    Continue Flonase and Zyrtec daily as prescribed for the next 3-4 weeks.    Return to clinic, seek medical re-evaluation if symptoms worsen or fail to improve after 48 hours of the above treatment plan  Continue Tessalon Perles cough pills that help with cough caused by the postnasal drip, throat irritation.  There good to take at the time and do not cause drowsiness.    Continue Phenergan cough syrup at night only to suppress cough so that you are able to rest.  You can take this together with Tessalon Perles for added benefit  Refrain from taking any decongestants, alcohol, caffeine as this will thicken mucous

## 2023-05-19 ENCOUNTER — TELEPHONE (OUTPATIENT)
Dept: PODIATRY | Facility: CLINIC | Age: 73
End: 2023-05-19
Payer: MEDICARE

## 2023-05-19 NOTE — TELEPHONE ENCOUNTER
----- Message from Paula Melton sent at 5/19/2023  9:58 AM CDT -----  Regarding: sooner appointment  Contact: patient  Type:  Sooner Appointment Request    Caller is requesting a sooner appointment.  Caller declined first available appointment listed below.  Caller will not accept being placed on the waitlist and is requesting a message be sent to doctor.    Name of Caller:  patient  When is the first available appointment?  05/29/23  Symptoms:  left ankle pain  Best Call Back Number:  [phone] (home)   Additional Information:  Patient would like to be seen as soon as possible. Please call patient to schedule. Thanks!

## 2023-05-22 ENCOUNTER — TELEPHONE (OUTPATIENT)
Dept: PODIATRY | Facility: CLINIC | Age: 73
End: 2023-05-22
Payer: MEDICARE

## 2023-06-05 ENCOUNTER — OFFICE VISIT (OUTPATIENT)
Dept: OPHTHALMOLOGY | Facility: CLINIC | Age: 73
End: 2023-06-05
Payer: MEDICARE

## 2023-06-05 DIAGNOSIS — Z98.890 STATUS POST YAG CAPSULOTOMY OF LEFT EYE: Primary | ICD-10-CM

## 2023-06-05 PROCEDURE — 99999 PR PBB SHADOW E&M-EST. PATIENT-LVL III: ICD-10-PCS | Mod: PBBFAC,,, | Performed by: OPHTHALMOLOGY

## 2023-06-05 PROCEDURE — 99024 POSTOP FOLLOW-UP VISIT: CPT | Mod: S$GLB,,, | Performed by: OPHTHALMOLOGY

## 2023-06-05 PROCEDURE — 1126F AMNT PAIN NOTED NONE PRSNT: CPT | Mod: CPTII,S$GLB,, | Performed by: OPHTHALMOLOGY

## 2023-06-05 PROCEDURE — 1160F RVW MEDS BY RX/DR IN RCRD: CPT | Mod: CPTII,S$GLB,, | Performed by: OPHTHALMOLOGY

## 2023-06-05 PROCEDURE — 1159F PR MEDICATION LIST DOCUMENTED IN MEDICAL RECORD: ICD-10-PCS | Mod: CPTII,S$GLB,, | Performed by: OPHTHALMOLOGY

## 2023-06-05 PROCEDURE — 3288F PR FALLS RISK ASSESSMENT DOCUMENTED: ICD-10-PCS | Mod: CPTII,S$GLB,, | Performed by: OPHTHALMOLOGY

## 2023-06-05 PROCEDURE — 99024 PR POST-OP FOLLOW-UP VISIT: ICD-10-PCS | Mod: S$GLB,,, | Performed by: OPHTHALMOLOGY

## 2023-06-05 PROCEDURE — 1157F PR ADVANCE CARE PLAN OR EQUIV PRESENT IN MEDICAL RECORD: ICD-10-PCS | Mod: CPTII,S$GLB,, | Performed by: OPHTHALMOLOGY

## 2023-06-05 PROCEDURE — 1101F PR PT FALLS ASSESS DOC 0-1 FALLS W/OUT INJ PAST YR: ICD-10-PCS | Mod: CPTII,S$GLB,, | Performed by: OPHTHALMOLOGY

## 2023-06-05 PROCEDURE — 3288F FALL RISK ASSESSMENT DOCD: CPT | Mod: CPTII,S$GLB,, | Performed by: OPHTHALMOLOGY

## 2023-06-05 PROCEDURE — 1101F PT FALLS ASSESS-DOCD LE1/YR: CPT | Mod: CPTII,S$GLB,, | Performed by: OPHTHALMOLOGY

## 2023-06-05 PROCEDURE — 1159F MED LIST DOCD IN RCRD: CPT | Mod: CPTII,S$GLB,, | Performed by: OPHTHALMOLOGY

## 2023-06-05 PROCEDURE — 1157F ADVNC CARE PLAN IN RCRD: CPT | Mod: CPTII,S$GLB,, | Performed by: OPHTHALMOLOGY

## 2023-06-05 PROCEDURE — 99999 PR PBB SHADOW E&M-EST. PATIENT-LVL III: CPT | Mod: PBBFAC,,, | Performed by: OPHTHALMOLOGY

## 2023-06-05 PROCEDURE — 1126F PR PAIN SEVERITY QUANTIFIED, NO PAIN PRESENT: ICD-10-PCS | Mod: CPTII,S$GLB,, | Performed by: OPHTHALMOLOGY

## 2023-06-05 PROCEDURE — 1160F PR REVIEW ALL MEDS BY PRESCRIBER/CLIN PHARMACIST DOCUMENTED: ICD-10-PCS | Mod: CPTII,S$GLB,, | Performed by: OPHTHALMOLOGY

## 2023-06-05 NOTE — PROGRESS NOTES
HPI    Pt here for 6 week post yag DFE.     Pt states has a rash under eyes since last visit. States does not itch or   bother her but will not go away. Denies pain/ FOL/ floaters.     Last edited by Hannah Dowd on 6/5/2023 10:26 AM.            Assessment /Plan     For exam results, see Encounter Report.    Status post YAG capsulotomy of left eye      VA improved, successful procedure  Pt defers OD at this time    F/u with Dr. Salvador for yearly eval

## 2023-06-05 NOTE — PATIENT INSTRUCTIONS
What are floaters?     Floaters look like small specks, dots, circles, lines or cobwebs in your field of vision. While they seem to be in front of your eye, they are floating inside. Floaters are tiny clumps of gel or cells inside the vitreous that fills your eye. What you see are the shadows these clumps cast on your retina.    You usually notice floaters when looking  at something plain, like a blank wall or a blue aniket.    As we age, our vitreous starts to thicken or shrink. Sometimes clumps or strands form in the vitreous. If the vitreous pulls away from the back of the eye, it is called posterior vitreous detachment. Floaters usually happen with posterior vitreous detachment. They are not serious, and they tend to fade or go away over time. Severe floaters can be removed by surgery, but this has risks and is seldom necessary.    You are more likely to get floaters if you:    are nearsighted (you need glasses to see far away)    have had surgery for cataracts    have had inflammation (swelling) inside the eye      What are flashes?     Flashes can look like flashing lights or lightning streaks in your field of vision. Some people compare them to seeing stars after being hit on the head. You might see flashes on and off for weeks, or even months. Flashes happen when the vitreous rubs or pulls on your retina.    As people age, it is common to see flashes occasionally.       When floaters and flashes are serious     Most floaters and flashes are not a problem. However, there are times when they can be signs of a serious condition. Here is when you should call an ophthalmologist right away:    you notice a lot of new floaters    you have a lot of flashes    a shadow appears in your peripheral (side) vision    a gray curtain covers part of  your vision    These floaters and flashes could be symptoms of a torn or detached retina. This is when the retina pulls away from the back of your eye. This is a serious  condition that needs to be treated.    Summary    Floaters are dark specks or dots in your field of vision. They are shadows you see from clumps of vitreous gel in your eye. Flashes are flashes of light that look like lightning streaks in your field of vision. Flashes occur when the vitreous gel rubs or pulls on your retina.    Floaters and flashes are quite common as people age. However, they can be signs of a retinal detachment, which is a serious problem. If you suddenly have a lot of floaters and see flashes, and you notice changes in your vision, call your ophthalmologist right away.

## 2023-08-17 ENCOUNTER — TELEPHONE (OUTPATIENT)
Dept: ADMINISTRATIVE | Facility: CLINIC | Age: 73
End: 2023-08-17
Payer: MEDICARE

## 2023-08-18 ENCOUNTER — OFFICE VISIT (OUTPATIENT)
Dept: FAMILY MEDICINE | Facility: CLINIC | Age: 73
End: 2023-08-18
Payer: MEDICARE

## 2023-08-18 VITALS
HEART RATE: 70 BPM | BODY MASS INDEX: 33.26 KG/M2 | HEIGHT: 62 IN | OXYGEN SATURATION: 95 % | SYSTOLIC BLOOD PRESSURE: 122 MMHG | DIASTOLIC BLOOD PRESSURE: 68 MMHG | TEMPERATURE: 98 F | WEIGHT: 180.75 LBS

## 2023-08-18 DIAGNOSIS — Z00.00 ENCOUNTER FOR PREVENTIVE HEALTH EXAMINATION: Primary | ICD-10-CM

## 2023-08-18 DIAGNOSIS — F32.4 MAJOR DEPRESSIVE DISORDER IN PARTIAL REMISSION, UNSPECIFIED WHETHER RECURRENT: ICD-10-CM

## 2023-08-18 DIAGNOSIS — D69.2 NONTHROMBOCYTOPENIC PURPURA: ICD-10-CM

## 2023-08-18 DIAGNOSIS — Z78.0 ASYMPTOMATIC POSTMENOPAUSAL STATE: ICD-10-CM

## 2023-08-18 DIAGNOSIS — Z12.11 SCREENING FOR MALIGNANT NEOPLASM OF COLON: ICD-10-CM

## 2023-08-18 PROCEDURE — 3074F SYST BP LT 130 MM HG: CPT | Mod: CPTII,S$GLB,, | Performed by: NURSE PRACTITIONER

## 2023-08-18 PROCEDURE — 3008F PR BODY MASS INDEX (BMI) DOCUMENTED: ICD-10-PCS | Mod: CPTII,S$GLB,, | Performed by: NURSE PRACTITIONER

## 2023-08-18 PROCEDURE — 1157F PR ADVANCE CARE PLAN OR EQUIV PRESENT IN MEDICAL RECORD: ICD-10-PCS | Mod: CPTII,S$GLB,, | Performed by: NURSE PRACTITIONER

## 2023-08-18 PROCEDURE — 1157F ADVNC CARE PLAN IN RCRD: CPT | Mod: CPTII,S$GLB,, | Performed by: NURSE PRACTITIONER

## 2023-08-18 PROCEDURE — 1170F PR FUNCTIONAL STATUS ASSESSED: ICD-10-PCS | Mod: CPTII,S$GLB,, | Performed by: NURSE PRACTITIONER

## 2023-08-18 PROCEDURE — 3008F BODY MASS INDEX DOCD: CPT | Mod: CPTII,S$GLB,, | Performed by: NURSE PRACTITIONER

## 2023-08-18 PROCEDURE — G0439 PPPS, SUBSEQ VISIT: HCPCS | Mod: S$GLB,,, | Performed by: NURSE PRACTITIONER

## 2023-08-18 PROCEDURE — 1101F PT FALLS ASSESS-DOCD LE1/YR: CPT | Mod: CPTII,S$GLB,, | Performed by: NURSE PRACTITIONER

## 2023-08-18 PROCEDURE — 1126F PR PAIN SEVERITY QUANTIFIED, NO PAIN PRESENT: ICD-10-PCS | Mod: CPTII,S$GLB,, | Performed by: NURSE PRACTITIONER

## 2023-08-18 PROCEDURE — 1170F FXNL STATUS ASSESSED: CPT | Mod: CPTII,S$GLB,, | Performed by: NURSE PRACTITIONER

## 2023-08-18 PROCEDURE — 99999 PR PBB SHADOW E&M-EST. PATIENT-LVL V: CPT | Mod: PBBFAC,,, | Performed by: NURSE PRACTITIONER

## 2023-08-18 PROCEDURE — 1126F AMNT PAIN NOTED NONE PRSNT: CPT | Mod: CPTII,S$GLB,, | Performed by: NURSE PRACTITIONER

## 2023-08-18 PROCEDURE — 1159F PR MEDICATION LIST DOCUMENTED IN MEDICAL RECORD: ICD-10-PCS | Mod: CPTII,S$GLB,, | Performed by: NURSE PRACTITIONER

## 2023-08-18 PROCEDURE — 1101F PR PT FALLS ASSESS DOC 0-1 FALLS W/OUT INJ PAST YR: ICD-10-PCS | Mod: CPTII,S$GLB,, | Performed by: NURSE PRACTITIONER

## 2023-08-18 PROCEDURE — 1160F PR REVIEW ALL MEDS BY PRESCRIBER/CLIN PHARMACIST DOCUMENTED: ICD-10-PCS | Mod: CPTII,S$GLB,, | Performed by: NURSE PRACTITIONER

## 2023-08-18 PROCEDURE — 3288F PR FALLS RISK ASSESSMENT DOCUMENTED: ICD-10-PCS | Mod: CPTII,S$GLB,, | Performed by: NURSE PRACTITIONER

## 2023-08-18 PROCEDURE — 3078F DIAST BP <80 MM HG: CPT | Mod: CPTII,S$GLB,, | Performed by: NURSE PRACTITIONER

## 2023-08-18 PROCEDURE — 3288F FALL RISK ASSESSMENT DOCD: CPT | Mod: CPTII,S$GLB,, | Performed by: NURSE PRACTITIONER

## 2023-08-18 PROCEDURE — 3078F PR MOST RECENT DIASTOLIC BLOOD PRESSURE < 80 MM HG: ICD-10-PCS | Mod: CPTII,S$GLB,, | Performed by: NURSE PRACTITIONER

## 2023-08-18 PROCEDURE — 1160F RVW MEDS BY RX/DR IN RCRD: CPT | Mod: CPTII,S$GLB,, | Performed by: NURSE PRACTITIONER

## 2023-08-18 PROCEDURE — 99999 PR PBB SHADOW E&M-EST. PATIENT-LVL V: ICD-10-PCS | Mod: PBBFAC,,, | Performed by: NURSE PRACTITIONER

## 2023-08-18 PROCEDURE — G0439 PR MEDICARE ANNUAL WELLNESS SUBSEQUENT VISIT: ICD-10-PCS | Mod: S$GLB,,, | Performed by: NURSE PRACTITIONER

## 2023-08-18 PROCEDURE — 3074F PR MOST RECENT SYSTOLIC BLOOD PRESSURE < 130 MM HG: ICD-10-PCS | Mod: CPTII,S$GLB,, | Performed by: NURSE PRACTITIONER

## 2023-08-18 PROCEDURE — 1159F MED LIST DOCD IN RCRD: CPT | Mod: CPTII,S$GLB,, | Performed by: NURSE PRACTITIONER

## 2023-08-18 NOTE — PATIENT INSTRUCTIONS
Counseling and Referral of Other Preventative  (Italic type indicates deductible and co-insurance are waived)    Patient Name: Syeda Gonsalves  Today's Date: 8/18/2023    Health Maintenance       Date Due Completion Date    COVID-19 Vaccine (4 - Moderna series) 12/23/2021 10/28/2021    Colorectal Cancer Screening 12/21/2022 2/8/2019    Override on 1/22/2017: Done (sent to [facility],)    DEXA Scan 05/22/2023 5/22/2020    Lipid Panel 07/25/2023 7/25/2022    Mammogram 12/15/2023 12/15/2022    TETANUS VACCINE 05/08/2024 5/8/2014        Orders Placed This Encounter   Procedures    Cologuard Screening (Multitarget Stool DNA)    DXA Bone Density Axial Skeleton 1 or more sites     The following information is provided to all patients.  This information is to help you find resources for any of the problems found today that may be affecting your health:                Living healthy guide: www.[facility].[place].[facility]      Understanding Diabetes: www.diabetes.org      Eating healthy: www.cdc.gov/healthyweight      CDC home safety checklist: www.cdc.gov/steadi/patient.html      Agency on Aging: www.goea.[place].gov      Alcoholics anonymous (AA): www.aa.org      Physical Activity: www.[place]ain.nih.gov/rq6iels      Tobacco use: www.quitwithusla.org

## 2023-08-18 NOTE — PROGRESS NOTES
"  Syeda Gonsalves presented for a  Medicare AWV and comprehensive Health Risk Assessment today. The following components were reviewed and updated:    Medical history  Family History  Social history  Allergies and Current Medications  Health Risk Assessment  Health Maintenance  Care Team         ** See Completed Assessments for Annual Wellness Visit within the encounter summary.**         The following assessments were completed:  Living Situation  CAGE  Depression Screening  Timed Get Up and Go  Whisper Test  Cognitive Function Screening  Nutrition Screening  ADL Screening  PAQ Screening    Clock in media     Vitals:    08/18/23 1348   BP: 122/68   Pulse: 70   Temp: 98 °F (36.7 °C)   TempSrc: Oral   SpO2: 95%   Weight: 82 kg (180 lb 12.4 oz)   Height: 5' 2" (1.575 m)     Body mass index is 33.06 kg/m².  Physical Exam  Constitutional:       Appearance: She is well-developed.   HENT:      Head: Normocephalic and atraumatic.      Nose: Nose normal.   Eyes:      General: Lids are normal.      Conjunctiva/sclera: Conjunctivae normal.   Cardiovascular:      Rate and Rhythm: Normal rate.   Pulmonary:      Effort: Pulmonary effort is normal.   Neurological:      Mental Status: She is alert and oriented to person, place, and time.   Psychiatric:         Speech: Speech normal.         Behavior: Behavior normal.               Diagnoses and health risks identified today and associated recommendations/orders:    1. Encounter for preventive health examination  Discussed health maintenance guidelines appropriate for age.    Review for Opioid Screening: Patient does not have rx for Opioids.    Review for Substance Use Disorders: Patient does not use substance.'      2. Screening for malignant neoplasm of colon    - Cologuard Screening (Multitarget Stool DNA); Future  - Cologuard Screening (Multitarget Stool DNA)    3. Asymptomatic postmenopausal state    - DXA Bone Density Axial Skeleton 1 or more sites; Future    4. " Nonthrombocytopenic purpura  Stable, continue to monitor    5. Major depressive disorder in partial remission, unspecified whether recurrent  Stable, continue current medication   Followed by pcp       Provided Syeda with a 5-10 year written screening schedule and personal prevention plan. Recommendations were developed using the USPSTF age appropriate recommendations. Education, counseling, and referrals were provided as needed. After Visit Summary printed and given to patient which includes a list of additional screenings\tests needed.    Follow up for One year for Annual Wellness Visit.    Raisa Russ NP  I offered to discuss advanced care planning, including how to pick a person who would make decisions for you if you were unable to make them for yourself, called a health care power of , and what kind of decisions you might make such as use of life sustaining treatments such as ventilators and tube feeding when faced with a life limiting illness recorded on a living will that they will need to know. (How you want to be cared for as you near the end of your natural life)     X  Patient has advanced directives on file, which we reviewed, and they do not wish to make changes.

## 2023-09-05 ENCOUNTER — HOSPITAL ENCOUNTER (OUTPATIENT)
Dept: RADIOLOGY | Facility: CLINIC | Age: 73
Discharge: HOME | End: 2023-09-05
Attending: NURSE PRACTITIONER
Payer: MEDICARE

## 2023-09-05 DIAGNOSIS — Z78.0 ASYMPTOMATIC POSTMENOPAUSAL STATE: ICD-10-CM

## 2023-09-05 PROCEDURE — 77080 DXA BONE DENSITY AXIAL: CPT | Mod: TC,PO

## 2023-09-05 PROCEDURE — 77080 DXA BONE DENSITY AXIAL SKELETON 1 OR MORE SITES: ICD-10-PCS | Mod: 26,,, | Performed by: RADIOLOGY

## 2023-09-05 PROCEDURE — 77080 DXA BONE DENSITY AXIAL: CPT | Mod: 26,,, | Performed by: RADIOLOGY

## 2023-09-07 DIAGNOSIS — M85.80 OSTEOPENIA AFTER MENOPAUSE: ICD-10-CM

## 2023-09-07 DIAGNOSIS — Z78.0 OSTEOPENIA AFTER MENOPAUSE: ICD-10-CM

## 2023-09-08 LAB — NONINV COLON CA DNA+OCC BLD SCRN STL QL: NEGATIVE

## 2023-09-08 RX ORDER — ERGOCALCIFEROL 1.25 MG/1
50000 CAPSULE ORAL
Qty: 12 CAPSULE | Refills: 3 | Status: SHIPPED | OUTPATIENT
Start: 2023-09-08 | End: 2023-11-13

## 2023-09-11 ENCOUNTER — PATIENT OUTREACH (OUTPATIENT)
Dept: ADMINISTRATIVE | Facility: HOSPITAL | Age: 73
End: 2023-09-11
Payer: MEDICARE

## 2023-09-11 NOTE — PROGRESS NOTES
Population Health Chart Review & Patient Outreach Details:     Reason for Outreach Encounter:     [x]  Non-Compliant Report   []  Payor Report (Humana, PHN, BCBS, MSSP, MCIP, UHC, etc.)   []  Pre-Visit Chart Review     Updates Requested / Reviewed:     [x]  Care Everywhere    []     []  External Sources ([facility], [facility], DIS, etc.)   [x]  Care Team Updated    Patient Outreach Method:    []  Telephone Outreach Completed   [] Successful   [] Left Voicemail   [] Unable to Contact (wrong number, no voicemail)  []  [employer] Portal Outreach Sent  []  Letter Outreach Mailed  []  Fax Sent for External Records  []  External Records Upload    Health Maintenance Topics Addressed and Outreach Outcomes / Actions Taken:        []      Breast Cancer Screening []  Mammo Scheduled      []  External Records Requested     []  Added Reminder to Complete to Upcoming Primary Care Appt Notes     []  Patient Declined     []  Patient Will Call Back to Schedule     []  Patient Will Schedule with External Provider / Order Routed if Applicable             []       Cervical Cancer Screening []  Pap Scheduled      []  External Records Requested     []  Added Reminder to Complete to Upcoming Primary Care Appt Notes     []  Patient Declined     []  Patient Will Call Back to Schedule     []  Patient Will Schedule with External Provider               []          Colorectal Cancer Screening []  Colonoscopy Case Request or Referral Placed     []  External Records Requested     []  Added Reminder to Complete to Upcoming Primary Care Appt Notes     []  Patient Declined     []  Patient Will Call Back to Schedule     []  Patient Will Schedule with External Provider     []  Fit Kit Mailed (add the SmartPhrase under additional notes)     []  Reminded Patient to Complete Home Test             []      Diabetic Eye Exam []  Eye Camera Scheduled or Optometry Referral Placed     []  External Records Requested     []  Added Reminder to Complete to  Upcoming Primary Care Appt Notes     []  Patient Declined     []  Patient Will Call Back to Schedule     []  Patient Will Schedule with External Provider             []      Blood Pressure Control []  Primary Care Follow Up Visit Scheduled     []  Remote Blood Pressure Reading Captured     []  Added Reminder to Complete to Upcoming Primary Care Appt Notes     []  Patient Declined     []  Patient Will Call Back / Patient Will Send Portal Message with Reading     []  Patient Will Call Back to Schedule Provider Visit             []       HbA1c & Other Labs []  Lab Appt Scheduled for Due Labs     []  Primary Care Follow Up Visit Scheduled      []  Reminded Patient to Complete Home Test     []  Added Reminder to Complete to Upcoming Primary Care Appt Notes     []  Patient Declined     []  Patient Will Call Back to Schedule     []  Patient Will Schedule with External Provider / Order Routed if Applicable           [x]    Schedule Primary Care Appt []  Primary Care Appt Scheduled     []  Patient Declined     []  Patient Will Call Back to Schedule     []  Pt Established with External Provider & Updated Care Team             []      Medication Adherence []  Primary Care Appointment Scheduled     []  Added Reminder to Upcoming Primary Care Appt Notes     []  Patient Reminded to  Prescription     []  Patient Declined, Provider Notified if Needed     []  Sent Provider Message to Review and/or Add Exclusion to Problem List             []      Osteoporosis Screening []  DXA Appointment Scheduled     []  External Records Requested     []  Added Reminder to Complete to Upcoming Primary Care Appt Notes     []  Patient Declined     []  Patient Will Call Back to Schedule     []  Patient Will Schedule with External Provider / Order Routed if Applicable     Additional Care Coordinator Notes:     Working report for annual PCP visit, & chart clean up. Has upcoming office visit with Dr Saldaña.    Further Action Needed If Patient  Returns Outreach:

## 2023-09-15 ENCOUNTER — TELEPHONE (OUTPATIENT)
Dept: FAMILY MEDICINE | Facility: CLINIC | Age: 73
End: 2023-09-15
Payer: MEDICARE

## 2023-09-15 NOTE — TELEPHONE ENCOUNTER
----- Message from Raisa Russ NP sent at 9/8/2023  9:08 AM CDT -----  Result sent to portal     [FreeTextEntry1] : # Graft function \par - normal graft function based on echo and hemodynamics\par \par # Immunosuppression\par - Allosure recently elevated in setting of subtherapetuic tac troughs and biopsy with 1R/2 rejection. optimizing IS as below and will follow routine biopsies/Allosure\par - continue tacrolimus and adjust as needed (current goal 8-10). I reviewed her pillbox today which was accurate\par - continue increased cellcept to 1250 mg BID given recent DSA \par - continue prednisone with taper per protocol, will wean to 2 mg tomorrow pending biopsy and DSA \par - will follow DSAs closely and awaiting results from today  \par - has not qualified for Allomap, will continue routine biopsy surveillance \par \par # ID prophylaxis\par - CMV -/+: completed valcyte \par - Toxo -/-: continue bactrim SS TIW\par \par # CAV prophylaxis\par - continue ASA 81 mg daily\par - continue pravastatin 20 mg daily \par \par # HTN\par - continue valsartan 80 mg daily, diltiazem 240 mg daily, doxazosin 8 mg daily\par \par # Seizure disorder \par - last seizure was in the in-hospital post-operative period \par  -follows with Dr. Mena, on vimpat 150 mg BID and keppra 1500 mg BID\par \par # HCM\par - has IUD in place for contraception\par - she received two doses of COVID-19 vaccine but not a booster, COVID spike Ab > 250, she refuses booster and Evushield today \par \par Return in 3 months for annual RHC/LHC/Bx

## 2023-09-26 ENCOUNTER — TELEPHONE (OUTPATIENT)
Dept: FAMILY MEDICINE | Facility: CLINIC | Age: 73
End: 2023-09-26
Payer: MEDICARE

## 2023-11-13 ENCOUNTER — OFFICE VISIT (OUTPATIENT)
Dept: FAMILY MEDICINE | Facility: CLINIC | Age: 73
End: 2023-11-13
Payer: MEDICARE

## 2023-11-13 VITALS
OXYGEN SATURATION: 95 % | WEIGHT: 184.06 LBS | HEART RATE: 70 BPM | RESPIRATION RATE: 18 BRPM | HEIGHT: 62 IN | SYSTOLIC BLOOD PRESSURE: 124 MMHG | BODY MASS INDEX: 33.87 KG/M2 | DIASTOLIC BLOOD PRESSURE: 72 MMHG

## 2023-11-13 DIAGNOSIS — E78.5 HYPERLIPIDEMIA, UNSPECIFIED HYPERLIPIDEMIA TYPE: ICD-10-CM

## 2023-11-13 DIAGNOSIS — Z72.0 TOBACCO USE: ICD-10-CM

## 2023-11-13 DIAGNOSIS — Z00.00 ROUTINE GENERAL MEDICAL EXAMINATION AT A HEALTH CARE FACILITY: Primary | ICD-10-CM

## 2023-11-13 DIAGNOSIS — E66.9 CLASS 1 OBESITY WITH SERIOUS COMORBIDITY AND BODY MASS INDEX (BMI) OF 33.0 TO 33.9 IN ADULT, UNSPECIFIED OBESITY TYPE: ICD-10-CM

## 2023-11-13 DIAGNOSIS — Z78.9 STATIN INTOLERANCE: ICD-10-CM

## 2023-11-13 DIAGNOSIS — F33.0 MILD EPISODE OF RECURRENT MAJOR DEPRESSIVE DISORDER: ICD-10-CM

## 2023-11-13 PROCEDURE — 99214 OFFICE O/P EST MOD 30 MIN: CPT | Mod: 25,S$GLB,, | Performed by: STUDENT IN AN ORGANIZED HEALTH CARE EDUCATION/TRAINING PROGRAM

## 2023-11-13 PROCEDURE — 1101F PT FALLS ASSESS-DOCD LE1/YR: CPT | Mod: CPTII,S$GLB,, | Performed by: STUDENT IN AN ORGANIZED HEALTH CARE EDUCATION/TRAINING PROGRAM

## 2023-11-13 PROCEDURE — 1157F PR ADVANCE CARE PLAN OR EQUIV PRESENT IN MEDICAL RECORD: ICD-10-PCS | Mod: CPTII,S$GLB,, | Performed by: STUDENT IN AN ORGANIZED HEALTH CARE EDUCATION/TRAINING PROGRAM

## 2023-11-13 PROCEDURE — 3078F PR MOST RECENT DIASTOLIC BLOOD PRESSURE < 80 MM HG: ICD-10-PCS | Mod: CPTII,S$GLB,, | Performed by: STUDENT IN AN ORGANIZED HEALTH CARE EDUCATION/TRAINING PROGRAM

## 2023-11-13 PROCEDURE — 93005 ELECTROCARDIOGRAM TRACING: CPT | Mod: S$GLB,,, | Performed by: STUDENT IN AN ORGANIZED HEALTH CARE EDUCATION/TRAINING PROGRAM

## 2023-11-13 PROCEDURE — 99999 PR PBB SHADOW E&M-EST. PATIENT-LVL IV: ICD-10-PCS | Mod: PBBFAC,,, | Performed by: STUDENT IN AN ORGANIZED HEALTH CARE EDUCATION/TRAINING PROGRAM

## 2023-11-13 PROCEDURE — 1159F MED LIST DOCD IN RCRD: CPT | Mod: CPTII,S$GLB,, | Performed by: STUDENT IN AN ORGANIZED HEALTH CARE EDUCATION/TRAINING PROGRAM

## 2023-11-13 PROCEDURE — 99406 PR TOBACCO USE CESSATION INTERMEDIATE 3-10 MINUTES: ICD-10-PCS | Mod: S$GLB,,, | Performed by: STUDENT IN AN ORGANIZED HEALTH CARE EDUCATION/TRAINING PROGRAM

## 2023-11-13 PROCEDURE — 93010 ELECTROCARDIOGRAM REPORT: CPT | Mod: S$GLB,,, | Performed by: INTERNAL MEDICINE

## 2023-11-13 PROCEDURE — 99999 PR PBB SHADOW E&M-EST. PATIENT-LVL IV: CPT | Mod: PBBFAC,,, | Performed by: STUDENT IN AN ORGANIZED HEALTH CARE EDUCATION/TRAINING PROGRAM

## 2023-11-13 PROCEDURE — 3074F SYST BP LT 130 MM HG: CPT | Mod: CPTII,S$GLB,, | Performed by: STUDENT IN AN ORGANIZED HEALTH CARE EDUCATION/TRAINING PROGRAM

## 2023-11-13 PROCEDURE — 3288F FALL RISK ASSESSMENT DOCD: CPT | Mod: CPTII,S$GLB,, | Performed by: STUDENT IN AN ORGANIZED HEALTH CARE EDUCATION/TRAINING PROGRAM

## 2023-11-13 PROCEDURE — 1160F RVW MEDS BY RX/DR IN RCRD: CPT | Mod: CPTII,S$GLB,, | Performed by: STUDENT IN AN ORGANIZED HEALTH CARE EDUCATION/TRAINING PROGRAM

## 2023-11-13 PROCEDURE — 93010 EKG 12-LEAD: ICD-10-PCS | Mod: S$GLB,,, | Performed by: INTERNAL MEDICINE

## 2023-11-13 PROCEDURE — 1126F PR PAIN SEVERITY QUANTIFIED, NO PAIN PRESENT: ICD-10-PCS | Mod: CPTII,S$GLB,, | Performed by: STUDENT IN AN ORGANIZED HEALTH CARE EDUCATION/TRAINING PROGRAM

## 2023-11-13 PROCEDURE — 1101F PR PT FALLS ASSESS DOC 0-1 FALLS W/OUT INJ PAST YR: ICD-10-PCS | Mod: CPTII,S$GLB,, | Performed by: STUDENT IN AN ORGANIZED HEALTH CARE EDUCATION/TRAINING PROGRAM

## 2023-11-13 PROCEDURE — 1157F ADVNC CARE PLAN IN RCRD: CPT | Mod: CPTII,S$GLB,, | Performed by: STUDENT IN AN ORGANIZED HEALTH CARE EDUCATION/TRAINING PROGRAM

## 2023-11-13 PROCEDURE — 1159F PR MEDICATION LIST DOCUMENTED IN MEDICAL RECORD: ICD-10-PCS | Mod: CPTII,S$GLB,, | Performed by: STUDENT IN AN ORGANIZED HEALTH CARE EDUCATION/TRAINING PROGRAM

## 2023-11-13 PROCEDURE — 3008F PR BODY MASS INDEX (BMI) DOCUMENTED: ICD-10-PCS | Mod: CPTII,S$GLB,, | Performed by: STUDENT IN AN ORGANIZED HEALTH CARE EDUCATION/TRAINING PROGRAM

## 2023-11-13 PROCEDURE — 3078F DIAST BP <80 MM HG: CPT | Mod: CPTII,S$GLB,, | Performed by: STUDENT IN AN ORGANIZED HEALTH CARE EDUCATION/TRAINING PROGRAM

## 2023-11-13 PROCEDURE — 99406 BEHAV CHNG SMOKING 3-10 MIN: CPT | Mod: S$GLB,,, | Performed by: STUDENT IN AN ORGANIZED HEALTH CARE EDUCATION/TRAINING PROGRAM

## 2023-11-13 PROCEDURE — 1160F PR REVIEW ALL MEDS BY PRESCRIBER/CLIN PHARMACIST DOCUMENTED: ICD-10-PCS | Mod: CPTII,S$GLB,, | Performed by: STUDENT IN AN ORGANIZED HEALTH CARE EDUCATION/TRAINING PROGRAM

## 2023-11-13 PROCEDURE — 99214 PR OFFICE/OUTPT VISIT, EST, LEVL IV, 30-39 MIN: ICD-10-PCS | Mod: 25,S$GLB,, | Performed by: STUDENT IN AN ORGANIZED HEALTH CARE EDUCATION/TRAINING PROGRAM

## 2023-11-13 PROCEDURE — 3288F PR FALLS RISK ASSESSMENT DOCUMENTED: ICD-10-PCS | Mod: CPTII,S$GLB,, | Performed by: STUDENT IN AN ORGANIZED HEALTH CARE EDUCATION/TRAINING PROGRAM

## 2023-11-13 PROCEDURE — 3008F BODY MASS INDEX DOCD: CPT | Mod: CPTII,S$GLB,, | Performed by: STUDENT IN AN ORGANIZED HEALTH CARE EDUCATION/TRAINING PROGRAM

## 2023-11-13 PROCEDURE — 93005 EKG 12-LEAD: ICD-10-PCS | Mod: S$GLB,,, | Performed by: STUDENT IN AN ORGANIZED HEALTH CARE EDUCATION/TRAINING PROGRAM

## 2023-11-13 PROCEDURE — 1126F AMNT PAIN NOTED NONE PRSNT: CPT | Mod: CPTII,S$GLB,, | Performed by: STUDENT IN AN ORGANIZED HEALTH CARE EDUCATION/TRAINING PROGRAM

## 2023-11-13 PROCEDURE — 3074F PR MOST RECENT SYSTOLIC BLOOD PRESSURE < 130 MM HG: ICD-10-PCS | Mod: CPTII,S$GLB,, | Performed by: STUDENT IN AN ORGANIZED HEALTH CARE EDUCATION/TRAINING PROGRAM

## 2023-11-13 NOTE — PROGRESS NOTES
"[facility] MEDICINE CLINIC NOTE    Patient Name: Syeda Gonsalves  YOB: 1950    PRESENTING HISTORY     History of Present Illness:  Ms. Syeda Gonsalves is a 72 y.o. female here to establish care.     Has been on antidepressants since 2019 since her boyfriend committed suicide.   Goes to outside psychiatry/therapy clinic. Not covered under her current insurance.   On sertraline 150mg. Takes amitryptyline 10mg occasionally.     No prior psychiatric hospitalizion.     Actively working, works helps.   Walks for 8 hours at job.   Works for [employer].     PHQ9- 9 points.   No SI.   Protective factors include her children, lives with one child.     HLD- didn't tolerate any pharmacotherapy for this.   The 10-year ASCVD risk score (Stoney CARRANZA, et al., 2019) is: 18.9%    Values used to calculate the score:      Age: 72 years      Sex: Female      Is Non- : No      Diabetic: No      Tobacco smoker: Yes      Systolic Blood Pressure: 124 mmHg      Is BP treated: No      HDL Cholesterol: 35 mg/dL      Total Cholesterol: 208 mg/dL    Tobacco use- occ.   Not willing to stop. Uses it for her nerves.   Started smoking in 30s, on weekends.   Started smoking daily about 5 years ago.   Difficult to establish pack year hx, prob <threshold for LDCT.   We discussed for 3 minutes.       UTD on mammogram.   Last C-scope benign, no f/u.     ROS      OBJECTIVE:   Vital Signs:  Vitals:    11/13/23 1629   BP: 124/72   Pulse: 70   Resp: 18   SpO2: 95%   Weight: 83.5 kg (184 lb 1.4 oz)   Height: 5' 2" (1.575 m)            Physical Exam  Vitals and nursing note reviewed.   Constitutional:       Appearance: She is obese. She is not diaphoretic.   HENT:      Head: Normocephalic and atraumatic.      Right Ear: External ear normal.      Left Ear: External ear normal.   Eyes:      General: No scleral icterus.     Conjunctiva/sclera: Conjunctivae normal.      Pupils: Pupils are equal, round, and reactive to " light.   Neck:      Thyroid: No thyromegaly.   Cardiovascular:      Rate and Rhythm: Normal rate and regular rhythm.      Heart sounds: Normal heart sounds. No murmur heard.  Pulmonary:      Effort: Pulmonary effort is normal. No respiratory distress.      Breath sounds: Normal breath sounds. No wheezing or rales.   Musculoskeletal:         General: No tenderness or deformity. Normal range of motion.      Cervical back: Normal range of motion and neck supple.      Right lower leg: No edema.      Left lower leg: No edema.   Lymphadenopathy:      Cervical: No cervical adenopathy.   Skin:     General: Skin is warm and dry.      Findings: No erythema or rash.   Neurological:      Mental Status: She is alert and oriented to person, place, and time.      Gait: Gait is intact.   Psychiatric:         Mood and Affect: Mood and affect normal.         Cognition and Memory: Memory normal.         Judgment: Judgment normal.         ASSESSMENT & PLAN:     Routine general medical examination at a health care facility    Mild episode of recurrent major depressive disorder  -     Comprehensive Metabolic Panel; Future; Expected date: 11/13/2023  -     TSH; Future; Expected date: 11/13/2023  -     IN OFFICE EKG 12-LEAD (to Tavo)  Discussed with patient option of adding adjunct vs maintaining as is. She wants to hold off on adjustments.     Statin intolerance    Hyperlipidemia, unspecified hyperlipidemia type  -     Lipid Panel; Future; Expected date: 11/13/2023  Hold on further medication trials. Would benefit more from smoking cessation.     Class 1 obesity with serious comorbidity and body mass index (BMI) of 33.0 to 33.9 in adult, unspecified obesity type  Comorbid with HLD.     Tobacco use             Surinder Saldaña  Internal Medicine

## 2023-12-06 ENCOUNTER — LAB VISIT (OUTPATIENT)
Dept: LAB | Facility: HOSPITAL | Age: 73
End: 2023-12-06
Attending: STUDENT IN AN ORGANIZED HEALTH CARE EDUCATION/TRAINING PROGRAM
Payer: MEDICARE

## 2023-12-06 DIAGNOSIS — E78.5 HYPERLIPIDEMIA, UNSPECIFIED HYPERLIPIDEMIA TYPE: ICD-10-CM

## 2023-12-06 DIAGNOSIS — F33.0 MILD EPISODE OF RECURRENT MAJOR DEPRESSIVE DISORDER: ICD-10-CM

## 2023-12-06 LAB
ALBUMIN SERPL BCP-MCNC: 3.5 G/DL (ref 3.5–5.2)
ALP SERPL-CCNC: 75 U/L (ref 55–135)
ALT SERPL W/O P-5'-P-CCNC: 12 U/L (ref 10–44)
ANION GAP SERPL CALC-SCNC: 8 MMOL/L (ref 8–16)
AST SERPL-CCNC: 20 U/L (ref 10–40)
BILIRUB SERPL-MCNC: 0.4 MG/DL (ref 0.1–1)
BUN SERPL-MCNC: 15 MG/DL (ref 8–23)
CALCIUM SERPL-MCNC: 9.4 MG/DL (ref 8.7–10.5)
CHLORIDE SERPL-SCNC: 103 MMOL/L (ref 95–110)
CHOLEST SERPL-MCNC: 224 MG/DL (ref 120–199)
CHOLEST/HDLC SERPL: 6.6 {RATIO} (ref 2–5)
CO2 SERPL-SCNC: 29 MMOL/L (ref 23–29)
CREAT SERPL-MCNC: 0.9 MG/DL (ref 0.5–1.4)
EST. GFR  (NO RACE VARIABLE): >60 ML/MIN/1.73 M^2
GLUCOSE SERPL-MCNC: 69 MG/DL (ref 70–110)
HDLC SERPL-MCNC: 34 MG/DL (ref 40–75)
HDLC SERPL: 15.2 % (ref 20–50)
LDLC SERPL CALC-MCNC: 139.4 MG/DL (ref 63–159)
NONHDLC SERPL-MCNC: 190 MG/DL
POTASSIUM SERPL-SCNC: 4.3 MMOL/L (ref 3.5–5.1)
PROT SERPL-MCNC: 7.2 G/DL (ref 6–8.4)
SODIUM SERPL-SCNC: 140 MMOL/L (ref 136–145)
TRIGL SERPL-MCNC: 253 MG/DL (ref 30–150)
TSH SERPL DL<=0.005 MIU/L-ACNC: 1.16 UIU/ML (ref 0.4–4)

## 2023-12-06 PROCEDURE — 84443 ASSAY THYROID STIM HORMONE: CPT | Performed by: STUDENT IN AN ORGANIZED HEALTH CARE EDUCATION/TRAINING PROGRAM

## 2023-12-06 PROCEDURE — 36415 COLL VENOUS BLD VENIPUNCTURE: CPT | Mod: PO | Performed by: STUDENT IN AN ORGANIZED HEALTH CARE EDUCATION/TRAINING PROGRAM

## 2023-12-06 PROCEDURE — 80053 COMPREHEN METABOLIC PANEL: CPT | Performed by: STUDENT IN AN ORGANIZED HEALTH CARE EDUCATION/TRAINING PROGRAM

## 2023-12-06 PROCEDURE — 80061 LIPID PANEL: CPT | Performed by: STUDENT IN AN ORGANIZED HEALTH CARE EDUCATION/TRAINING PROGRAM

## 2024-01-22 ENCOUNTER — TELEPHONE (OUTPATIENT)
Dept: PSYCHIATRY | Facility: CLINIC | Age: 74
End: 2024-01-22
Payer: MEDICARE

## 2024-01-22 NOTE — TELEPHONE ENCOUNTER
Pt called requesting to schedule an appointment. Advised her that we require a referral [facility]. Explained that we have an extensive wait list and it may be 8+ months to be scheduled for a new pt appointment. She verbalized understanding.

## 2024-01-31 NOTE — TELEPHONE ENCOUNTER
----- Message from Mally Gibson sent at 3/1/2017 10:34 AM CST -----  Contact: pt [phone]  Patient called back for her test results from 2/10/17 please call back.    
Dr. Carty, pt is calling for FNA results.   
I called Ms.Syeda ZAMARRIPA Major discussed results as below. Left nodule unsatisfactory - advise to repeat FNA   She will call her insurance and find out if repeat one is covered and will let us know    CLINICAL DIAGNOSIS/INFORMATION  Clinical Information: MNG  Clinical Information: MNG  Gross Description  #1(RT): 6 FX SLIDES, 1 SACCOMONAS JAR;TH  6 SLIDES, 1 NGTP  #2(LT): 8 FX SLIDES, 1 SACCOMONAS JAR;TH  8 SLIDES, 1 NGTP  SPECIMEN  1) FNA RIGHT Thyroid (Clinician)  2) FNA LEFT Thyroid (Clinician)  FINAL PATHOLOGIC DIAGNOSIS  1. THYROID, RIGHT (FINE NEEDLE ASPIRATION):  -[facility] Thyroid Cytology Category: Benign  -Colloid nodule  2. THYROID, LEFT (FINE NEEDLE ASPIRATION):  -[facility] Thyroid Cytology Category: Unsatisfactory  -Scant thin colloid and very rare follicular cells      
[Negative] : Heme/Lymph

## 2024-02-12 ENCOUNTER — OFFICE VISIT (OUTPATIENT)
Dept: PODIATRY | Facility: CLINIC | Age: 74
End: 2024-02-12
Payer: MEDICARE

## 2024-02-12 VITALS — HEIGHT: 62 IN | BODY MASS INDEX: 33.86 KG/M2 | WEIGHT: 184 LBS

## 2024-02-12 DIAGNOSIS — M21.42 ACQUIRED PES PLANOVALGUS OF LEFT FOOT: ICD-10-CM

## 2024-02-12 DIAGNOSIS — M76.822 POSTERIOR TIBIAL TENDON DYSFUNCTION (PTTD) OF LEFT LOWER EXTREMITY: Primary | ICD-10-CM

## 2024-02-12 PROCEDURE — 3288F FALL RISK ASSESSMENT DOCD: CPT | Mod: CPTII,S$GLB,, | Performed by: PODIATRIST

## 2024-02-12 PROCEDURE — 1101F PT FALLS ASSESS-DOCD LE1/YR: CPT | Mod: CPTII,S$GLB,, | Performed by: PODIATRIST

## 2024-02-12 PROCEDURE — 1157F ADVNC CARE PLAN IN RCRD: CPT | Mod: CPTII,S$GLB,, | Performed by: PODIATRIST

## 2024-02-12 PROCEDURE — 1125F AMNT PAIN NOTED PAIN PRSNT: CPT | Mod: CPTII,S$GLB,, | Performed by: PODIATRIST

## 2024-02-12 PROCEDURE — 1159F MED LIST DOCD IN RCRD: CPT | Mod: CPTII,S$GLB,, | Performed by: PODIATRIST

## 2024-02-12 PROCEDURE — 3008F BODY MASS INDEX DOCD: CPT | Mod: CPTII,S$GLB,, | Performed by: PODIATRIST

## 2024-02-12 PROCEDURE — 99213 OFFICE O/P EST LOW 20 MIN: CPT | Mod: S$GLB,,, | Performed by: PODIATRIST

## 2024-02-12 PROCEDURE — 99999 PR PBB SHADOW E&M-EST. PATIENT-LVL III: CPT | Mod: PBBFAC,,, | Performed by: PODIATRIST

## 2024-02-12 PROCEDURE — 1160F RVW MEDS BY RX/DR IN RCRD: CPT | Mod: CPTII,S$GLB,, | Performed by: PODIATRIST

## 2024-02-12 NOTE — PROGRESS NOTES
Subjective:      Patient ID: Syeda Gonsalves is a 73 y.o. female.    Chief Complaint: Foot Pain (left)    Syeda is a 73 y.o. female who presents to the podiatry clinic  with complaint of  left foot pain noted in the heel arch and up the medial ankle. Onset of the symptoms was several months ago. Precipitating event: none known. Current symptoms include: ability to bear weight, but with some pain, swelling, and worsening symptoms after a period of activity. Aggravating factors: any weight bearing. Symptoms have gradually worsened. Patient has had prior foot problems. Evaluation to date: plain films: normal. Treatment to date: corticosteroid injection which was not very effective. Patients rates pain 5/10 on pain scale.    Review of Systems   Constitutional: Negative for chills and fever.   Cardiovascular:  Negative for claudication and leg swelling.   Respiratory:  Negative for shortness of breath.    Skin:  Negative for itching, nail changes and rash.   Musculoskeletal:  Negative for muscle cramps, muscle weakness and myalgias.        Left foot pain   Gastrointestinal:  Negative for nausea and vomiting.   Neurological:  Negative for focal weakness, loss of balance, numbness and paresthesias.           Objective:      Physical Exam  Constitutional:       General: She is not in acute distress.     Appearance: She is well-developed. She is not diaphoretic.   Cardiovascular:      Pulses:           Dorsalis pedis pulses are 2+ on the right side and 2+ on the left side.        Posterior tibial pulses are 2+ on the right side and 2+ on the left side.      Comments: < 3 sec capillary refill time to toes 1-5 bilateral. Toes and feet are warm to touch proximally with normal distal cooling b/l. There is some hair growth on the feet and toes b/l. There is no edema b/l. No spider veins or varicosities present b/l.     Musculoskeletal:      Comments: Equinus noted b/l ankles with < 10 deg DF noted. MMT 5/5 in DF/PF/Inv/Ev  resistance with no reproduction of pain in any direction. Passive range of motion of ankle and pedal joints is painless b/l.  '  Left foot pain with palpation to the medial arch and the plantar medial heel as well as along the distal PT tendon with noted medial arch collapse with weight bearing and pronation throughout stance phase of gait.    Skin:     General: Skin is warm and dry.      Coloration: Skin is not pale.      Findings: No abrasion, bruising, burn, ecchymosis, erythema, laceration, lesion, petechiae or rash.      Nails: There is no clubbing.      Comments: Skin temperature, texture and turgor within normal limits.   Neurological:      Mental Status: She is alert and oriented to person, place, and time.      Sensory: No sensory deficit.      Motor: No tremor, atrophy or abnormal muscle tone.      Comments: Negative tinel sign bilateral.   Psychiatric:         Behavior: Behavior normal.               Assessment:       Encounter Diagnoses   Name Primary?    Posterior tibial tendon dysfunction (PTTD) of left lower extremity Yes    Acquired pes planovalgus of left foot          Plan:       Syeda was seen today for foot pain.    Diagnoses and all orders for this visit:    Posterior tibial tendon dysfunction (PTTD) of left lower extremity    Acquired pes planovalgus of left foot      I counseled the patient on her conditions, their implications and medical management.    Dispensed recommendations for better supportive shoes    Patient will obtain over the counter arch supports and wear them in shoes whenever possible.  Athletic shoes intended for walking or running are usually best.    Patient will stretch the tendo achilles complex three times daily as demonstrated in the office.  Literature was dispensed illustrating proper stretching technique.    Avoid barefoot or flats    Consider custom orthotic inserts    Return PRN    Ze Kasper DPM

## 2024-02-12 NOTE — PATIENT INSTRUCTIONS
1. Stretch calf and plantar fascia at least 3x per day for 30 sec and before getting out of bed.    2. Supportive shoes at all times (athletic shoe including galindo, new balance, asics, HOKA or casual shoes like Dansko, Ana Lilia, Naot, Vionoic, Fit flop  clog or wedge with extra heel padding and arch support. For house slippers would recommend Fitflop or Spenco found on amazon.com, Never walk barefoot or in flats.    (Varsity sports, Phidippides, [place] running company, Masseys, Goodfeet, Cantilever, Feet First, Foot Solutions, Therapeutic shoes, SAS, [facility] [employer] [facility] pro shop) http://www.Ception Therapeutics.com/    3. Orthotic (recommend the following brands: Superfeet, Spenco, Powerstep, Sof Sole Fit Series)

## 2024-02-26 ENCOUNTER — TELEPHONE (OUTPATIENT)
Dept: FAMILY MEDICINE | Facility: CLINIC | Age: 74
End: 2024-02-26
Payer: MEDICARE

## 2024-02-26 DIAGNOSIS — Z12.31 ENCOUNTER FOR SCREENING MAMMOGRAM FOR BREAST CANCER: Primary | ICD-10-CM

## 2024-02-26 NOTE — TELEPHONE ENCOUNTER
Last screening mammogram performed on 12-15-22.  Screening mammogram orders placed. Mammogram appointment scheduled for the date of 4-17-24. Patient agreed to appointment date, time, and location.  Offered sooner appointment at different location, patient states she prefers [facility] location.

## 2024-02-26 NOTE — TELEPHONE ENCOUNTER
Vandana Gallardo Staff     ----- Message from Vandana Gallardo sent at 2/26/2024  1:03 PM CST -----  Contact: self  Type: Needs Medical Advice  Who Called:  pt  Best Call Back Number: [phone]   Additional Information: pt needs orders to schedule mammo.please advise

## 2024-04-12 ENCOUNTER — PATIENT OUTREACH (OUTPATIENT)
Dept: ADMINISTRATIVE | Facility: OTHER | Age: 74
End: 2024-04-12
Payer: MEDICARE

## 2024-04-15 VITALS — SYSTOLIC BLOOD PRESSURE: 124 MMHG | DIASTOLIC BLOOD PRESSURE: 58 MMHG | OXYGEN SATURATION: 97 % | HEART RATE: 62 BPM

## 2024-04-17 ENCOUNTER — HOSPITAL ENCOUNTER (OUTPATIENT)
Dept: RADIOLOGY | Facility: CLINIC | Age: 74
Discharge: HOME | End: 2024-04-17
Attending: STUDENT IN AN ORGANIZED HEALTH CARE EDUCATION/TRAINING PROGRAM
Payer: MEDICARE

## 2024-04-17 DIAGNOSIS — Z12.31 ENCOUNTER FOR SCREENING MAMMOGRAM FOR BREAST CANCER: ICD-10-CM

## 2024-04-17 PROCEDURE — 77067 SCR MAMMO BI INCL CAD: CPT | Mod: TC,PO

## 2024-04-17 PROCEDURE — 77067 SCR MAMMO BI INCL CAD: CPT | Mod: 26,,, | Performed by: RADIOLOGY

## 2024-04-17 PROCEDURE — 77063 BREAST TOMOSYNTHESIS BI: CPT | Mod: 26,,, | Performed by: RADIOLOGY

## 2024-05-13 ENCOUNTER — TELEPHONE (OUTPATIENT)
Dept: FAMILY MEDICINE | Facility: CLINIC | Age: 74
End: 2024-05-13
Payer: MEDICARE

## 2024-05-13 NOTE — TELEPHONE ENCOUNTER
----- Message from Christina Haywood sent at 5/13/2024 11:42 AM CDT -----  Regarding: Same Day Appt  Type:  Same Day Appointment Request    Caller is requesting a same day appointment.  Caller declined first available appointment listed below.      Name of Caller:  pt   When is the first available appointment?  unk  Symptoms:  f/u   Best Call Back Number:  [phone] (home)   Additional Information:  requesting a call back and appt asap pt is requesting appt tomorrow around 11or after 11 but before 3  . Pt has appt for Friday but would like one earlier   please advise thank you

## 2024-05-14 ENCOUNTER — OFFICE VISIT (OUTPATIENT)
Dept: FAMILY MEDICINE | Facility: CLINIC | Age: 74
End: 2024-05-14
Payer: MEDICARE

## 2024-05-14 VITALS
HEIGHT: 62 IN | WEIGHT: 185.88 LBS | BODY MASS INDEX: 34.2 KG/M2 | DIASTOLIC BLOOD PRESSURE: 72 MMHG | SYSTOLIC BLOOD PRESSURE: 136 MMHG

## 2024-05-14 DIAGNOSIS — F43.21 COMPLICATED GRIEF: Primary | ICD-10-CM

## 2024-05-14 DIAGNOSIS — F33.0 MILD EPISODE OF RECURRENT MAJOR DEPRESSIVE DISORDER: ICD-10-CM

## 2024-05-14 PROCEDURE — 99999 PR PBB SHADOW E&M-EST. PATIENT-LVL IV: CPT | Mod: PBBFAC,,, | Performed by: STUDENT IN AN ORGANIZED HEALTH CARE EDUCATION/TRAINING PROGRAM

## 2024-05-14 PROCEDURE — 1125F AMNT PAIN NOTED PAIN PRSNT: CPT | Mod: CPTII,S$GLB,, | Performed by: STUDENT IN AN ORGANIZED HEALTH CARE EDUCATION/TRAINING PROGRAM

## 2024-05-14 PROCEDURE — G2211 COMPLEX E/M VISIT ADD ON: HCPCS | Mod: S$GLB,,, | Performed by: STUDENT IN AN ORGANIZED HEALTH CARE EDUCATION/TRAINING PROGRAM

## 2024-05-14 PROCEDURE — 3078F DIAST BP <80 MM HG: CPT | Mod: CPTII,S$GLB,, | Performed by: STUDENT IN AN ORGANIZED HEALTH CARE EDUCATION/TRAINING PROGRAM

## 2024-05-14 PROCEDURE — 1159F MED LIST DOCD IN RCRD: CPT | Mod: CPTII,S$GLB,, | Performed by: STUDENT IN AN ORGANIZED HEALTH CARE EDUCATION/TRAINING PROGRAM

## 2024-05-14 PROCEDURE — 96127 BRIEF EMOTIONAL/BEHAV ASSMT: CPT | Mod: S$GLB,,, | Performed by: STUDENT IN AN ORGANIZED HEALTH CARE EDUCATION/TRAINING PROGRAM

## 2024-05-14 PROCEDURE — 1160F RVW MEDS BY RX/DR IN RCRD: CPT | Mod: CPTII,S$GLB,, | Performed by: STUDENT IN AN ORGANIZED HEALTH CARE EDUCATION/TRAINING PROGRAM

## 2024-05-14 PROCEDURE — 1157F ADVNC CARE PLAN IN RCRD: CPT | Mod: CPTII,S$GLB,, | Performed by: STUDENT IN AN ORGANIZED HEALTH CARE EDUCATION/TRAINING PROGRAM

## 2024-05-14 PROCEDURE — 1101F PT FALLS ASSESS-DOCD LE1/YR: CPT | Mod: CPTII,S$GLB,, | Performed by: STUDENT IN AN ORGANIZED HEALTH CARE EDUCATION/TRAINING PROGRAM

## 2024-05-14 PROCEDURE — 3288F FALL RISK ASSESSMENT DOCD: CPT | Mod: CPTII,S$GLB,, | Performed by: STUDENT IN AN ORGANIZED HEALTH CARE EDUCATION/TRAINING PROGRAM

## 2024-05-14 PROCEDURE — 99214 OFFICE O/P EST MOD 30 MIN: CPT | Mod: S$GLB,,, | Performed by: STUDENT IN AN ORGANIZED HEALTH CARE EDUCATION/TRAINING PROGRAM

## 2024-05-14 PROCEDURE — 3075F SYST BP GE 130 - 139MM HG: CPT | Mod: CPTII,S$GLB,, | Performed by: STUDENT IN AN ORGANIZED HEALTH CARE EDUCATION/TRAINING PROGRAM

## 2024-05-14 RX ORDER — BUPROPION HYDROCHLORIDE 150 MG/1
150 TABLET ORAL DAILY
Qty: 30 TABLET | Refills: 11 | Status: SHIPPED | OUTPATIENT
Start: 2024-05-14 | End: 2025-05-14

## 2024-05-14 NOTE — PATIENT INSTRUCTIONS
Tariq Landa,     If you are due for any health screening(s) below please notify me so we can arrange them to be ordered and scheduled to maintain your health. Most healthy patients complete it. Don't lose out on improving your health.     All of your core healthy metrics are met.

## 2024-05-14 NOTE — PROGRESS NOTES
"[facility] MEDICINE CLINIC NOTE    Patient Name: Syeda Gonsalves  YOB: 1950    PRESENTING HISTORY     History of Present Illness:  Ms. Syeda Gonsalves is a 73 y.o. female here for depression.     CC: "I feel like I need to increase my sertraline"    5 years ago today her fiancee committed suicide.   Went to SOS meeting recently, continues to attend support group.     Depression "isn't that bad", it's remmbering him, heart aches.     C/o chronic tiredness.     Phq9- 10 points. No active or passive SI.     Lives with son. Daughter in [place].       Current medication sertraline 150mg. Occ takes 10mg elavil for sleep.   Takes gabapenitn prn for nerve pains very occasionally.     Used to be seen at [facility] until her insurance changed. Currently does not have a psychiatrist.       ROS      OBJECTIVE:   Vital Signs:  Vitals:    05/14/24 1115   BP: 136/72   Weight: 84.3 kg (185 lb 13.6 oz)   Height: 5' 2" (1.575 m)          Physical Exam: Appropriately groomed. Mood depressed, affect congruent. Thoughts linear. Mood normal.     Physical Exam    ASSESSMENT & PLAN:     Complicated grief  -     Ambulatory referral/consult to Primary Care Behavioral Health (Non-Opioids); Future; Expected date: 05/21/2024    Mild episode of recurrent major depressive disorder  -     buPROPion (WELLBUTRIN XL) 150 MG TB24 tablet; Take 1 tablet (150 mg total) by mouth once daily.  Dispense: 30 tablet; Refill: 11      Trial of psychotherapy to work through her chronic grief and some adjunct wellbutrin.   Recheck in 6 weeks.          Surinder Saldaña  Internal Medicine    Visit complexity inherent to evaluation and management associated with medical care services that serve as the continuing focal point for all needed health care services and/or with medical care services that are part of ongoing care related to a patient's single, serious condition or a complex condition provided today          "

## 2024-05-15 ENCOUNTER — PATIENT MESSAGE (OUTPATIENT)
Dept: BEHAVIORAL HEALTH | Facility: CLINIC | Age: 74
End: 2024-05-15
Payer: MEDICARE

## 2024-05-17 ENCOUNTER — TELEPHONE (OUTPATIENT)
Dept: BEHAVIORAL HEALTH | Facility: CLINIC | Age: 74
End: 2024-05-17
Payer: MEDICARE

## 2024-05-17 NOTE — PROGRESS NOTES
Behavioral Health Community Health Worker  Initial Assessment  Completed by:  Antolin Cortez    Date:  5/17/2024    Patient Enrollment in Behavioral Health Program:  Patient verbalized understanding of Behavioral Health Integration services to include:  Patient understands that CHW, LCSW, PharmD and consulting Psychiatrist are members of the care team working collaboratively with his/her primary care provider: Yes  Patient understands that activation of their [facility] patient portal account is required for accessing the full scope of team services: Yes  Patient understands that some counseling sessions may occur via video: Yes  Clinic visits with the psychiatrist may be subject to a co-pay based on your insurance: Yes  Patient consents to enroll in BHI program: Yes    Assessments     Single Item Health Literacy Scale:       Promis 10:       Depression PHQ:      5/17/2024     1:08 PM 8/18/2023     1:43 PM 5/9/2022     9:45 AM 4/22/2021     2:32 PM 2/27/2020     1:40 PM 8/16/2018    11:45 AM   PHQ-9 Depression Patient Health Questionnaire   Over the last two weeks how often have you been bothered by little interest or pleasure in doing things 2 0 0 1 0 1   Over the last two weeks how often have you been bothered by feeling down, depressed or hopeless 2 0 1 1 3 1   Over the last two weeks how often have you been bothered by trouble falling or staying asleep, or sleeping too much 0    2    Over the last two weeks how often have you been bothered by feeling tired or having little energy 2    0    Over the last two weeks how often have you been bothered by a poor appetite or overeating 3    2    Over the last two weeks how often have you been bothered by feeling bad about yourself - or that you are a failure or have let yourself or your family down 2    0    Over the last two weeks how often have you been bothered by trouble concentrating on things, such as reading the newspaper or watching television 0    0    Over the  last two weeks how often have you been bothered by moving or speaking so slowly that other people could have noticed. 0    2    Over the last two weeks how often have you been bothered by thoughts that you would be better off dead, or of hurting yourself 0    0    If you checked off any problems, how difficult have these problems made it for you to do your work, take care of things at home or get along with other people? Somewhat difficult    Somewhat difficult    PHQ-9 Score 11    9           Generalized Anxiety Disorder 7-Item Scale:      5/17/2024     1:10 PM   GAD7   1. Feeling nervous, anxious, or on edge? 2   2. Not being able to stop or control worrying? 3   3. Worrying too much about different things? 3   4. Trouble relaxing? 0   5. Being so restless that it is hard to sit still? 2   6. Becoming easily annoyed or irritable? 2   7. Feeling afraid as if something awful might happen? 1   8. If you checked off any problems, how difficult have these problems made it for you to do your work, take care of things at home, or get along with other people? 1   NINI-7 Score 13       History     Social History     Socioeconomic History    Marital status:     Number of children: 2    Years of education: 13    Highest education level: Some college, no degree   Occupational History    Occupation: [employer]     Employer: [employer]   Tobacco Use    Smoking status: Some Days     Current packs/day: 0.25     Average packs/day: 0.3 packs/day for 26.4 years (6.6 ttl pk-yrs)     Types: Cigarettes     Start date: 12/21/1997     Last attempt to quit: 12/21/2017    Smokeless tobacco: Never   Substance and Sexual Activity    Alcohol use: No    Drug use: No    Sexual activity: Yes     Partners: Male     Social Determinants of Health     Financial Resource Strain: Low Risk  (8/18/2023)    Overall Financial Resource Strain (CARDIA)     Difficulty of Paying Living Expenses: Not hard at all   Food Insecurity: No Food Insecurity  "(8/18/2023)    Hunger Vital Sign     Worried About Running Out of Food in the Last Year: Never true     Ran Out of Food in the Last Year: Never true   Transportation Needs: No Transportation Needs (8/18/2023)    PRAPARE - Transportation     Lack of Transportation (Medical): No     Lack of Transportation (Non-Medical): No   Physical Activity: Insufficiently Active (8/18/2023)    Exercise Vital Sign     Days of Exercise per Week: 4 days     Minutes of Exercise per Session: 30 min   Stress: No Stress Concern Present (8/18/2023)    [place] [employer] - Occupational Stress Questionnaire     Feeling of Stress : Only a little   Housing Stability: Low Risk  (8/18/2023)    Housing Stability Vital Sign     Unable to Pay for Housing in the Last Year: No     Number of Places Lived in the Last Year: 1     Unstable Housing in the Last Year: No       Call Summary     Patient was referred to the BHI (Non-opioid) program by Primary Care Provider, Dr. Saldaña CHW contacted Syeda Gonsalves who reports grief that limits [his/her] activities of daily living (ADLs).   Patient scored "11" on the PHQ9 and "13" on the NINI 7. Based on these scores patient is eligible for the Behavioral health Integration (Non-opioid) Program. CHW completed the intake and scheduled an appointment for patient with Dr Giana Padilla, LPC,PhD on 5/24/24.        "

## 2024-05-24 ENCOUNTER — OFFICE VISIT (OUTPATIENT)
Dept: BEHAVIORAL HEALTH | Facility: CLINIC | Age: 74
End: 2024-05-24
Payer: MEDICARE

## 2024-05-24 DIAGNOSIS — F43.21 COMPLICATED GRIEF: ICD-10-CM

## 2024-05-24 DIAGNOSIS — F33.41 RECURRENT MAJOR DEPRESSIVE DISORDER, IN PARTIAL REMISSION: Primary | ICD-10-CM

## 2024-05-24 PROCEDURE — 1157F ADVNC CARE PLAN IN RCRD: CPT | Mod: CPTII,S$GLB,, | Performed by: COUNSELOR

## 2024-05-24 PROCEDURE — 90791 PSYCH DIAGNOSTIC EVALUATION: CPT | Mod: S$GLB,,, | Performed by: COUNSELOR

## 2024-05-24 NOTE — PROGRESS NOTES
Primary Care Behavioral Health Integration: Initial  Date:  5/24/2024  Referral Source:  Surinder Saldaña MD  Length of Appointment: 30    Chief Complaint/Reason for Encounter:  Depression and Anger    History of Present Illness: Syeda Gonsalves, a 73 y.o. female referred by Surinder Saldaña MD.  Patient was seen, examined and chart was reviewed. Met with patient.     Current Session: Patient reported being depressed and angry over boyfriend committing suicide 5 years ago. Is still angry for him doing it and leaving her. Goes to SOS (support of suicide) meetings for support. Pt is a cancer survivor. Pt was only interested in working with a EMDR therapist only. Gave pt resources.    Past Medical History:   Diagnosis Date    Abnormal Pap smear 1990    Allergy to statin medication     Anxiety     Arthritis     Asthma     Back pain     Cervical cancer     Depression     GERD (gastroesophageal reflux disease)     H/O head and neck radiation 01/11/2017    Hyperlipidemia     Kidney insufficiency     Multinodular goiter 01/11/2017    Obesity     Smoking 01/11/2017    Uterine cancer          Current Outpatient Medications:     albuterol (VENTOLIN HFA) 90 mcg/actuation inhaler, Inhale 2 puffs into the lungs every 6 (six) hours as needed for Wheezing. Rescue, Disp: 18 g, Rfl: 0    amitriptyline (ELAVIL) 10 MG tablet, Take 1 tablet (10 mg total) by mouth nightly as needed for Insomnia., Disp: 30 tablet, Rfl: 1    buPROPion (WELLBUTRIN XL) 150 MG TB24 tablet, Take 1 tablet (150 mg total) by mouth once daily., Disp: 30 tablet, Rfl: 11    clindamycin phosphate 1% (CLINDAGEL) 1 % gel, Apply topically 2 (two) times daily., Disp: 60 g, Rfl: 3    fluocinolone acetonide oiL 0.01 % Drop, 3-4 drops to the affected ear(s) twice daily no more than a week at a time as needed for itching and scaling, Disp: 20 mL, Rfl: 0    fluticasone propionate (FLONASE) 50 mcg/actuation nasal spray, 1 spray (50 mcg total) by Each Nostril route once  daily., Disp: 15.8 mL, Rfl: 0    gabapentin (NEURONTIN) 300 MG capsule, 1 p.o. in the morning 1 p.o. in the afternoon and 2 p.o. at night, Disp: 120 capsule, Rfl: 11    GARLIC (GARLIQUE ORAL), Take 1 capsule by mouth once daily., Disp: , Rfl:     magnesium oxide (MAG-OX) 400 mg (241.3 mg magnesium) tablet, Take 400 mg by mouth once daily., Disp: , Rfl:     MULTIVITAMIN (MULTIPLE VITAMINS ORAL), Take by mouth., Disp: , Rfl:     ondansetron (ZOFRAN-ODT) 4 MG TbDL, Take 1 tablet (4 mg total) by mouth every 8 (eight) hours as needed (nausea, vomiting)., Disp: 12 tablet, Rfl: 0    red yeast rice 600 mg Cap, Take 2 capsules by mouth every evening., Disp: , Rfl:     sertraline (ZOLOFT) 100 MG tablet, Take 1.5 tablets by mouth once daily. 125mg daily, Disp: , Rfl:     Current symptoms:  Depression Symptoms: worthlessness/guilt and difficulty concentrating.  Anxiety Symptoms: denies.  Sleep Difficulties:  denies .  Manic Symptoms:  denies.  Psychosis: denies .    Risk assessment:  Patient reports no suicidal ideation  Patient reports no homicidal ideation  Patient reports no self-injurious behavior  Patient reports no violent behavior    Patient advised to call [phone] or present the the nearest ED if they experience suicidal or homicidal ideation, plan or intent.      Psychiatric History:  Diagnosis:    Current Psychiatric Medication: Yes - Zoloft    Medication Trial History:  Medication Trials: Yes    Outpatient Treatment: Yes    Inpatient Treatment: No   Suicide Attempts: No   Access to Firearms: No   History of Trauma: Yes    Family Psychiatric History:      Current and Past Substance Use:  Alcohol: Patient denies alcohol use.    Drugs: Denied.   Nicotine: Cigarettes   Caffeine:  Coffee     Mental Status Exam  General Appearance:  appears stated age, neatly dressed, well groomed   Speech: normal tone, normal rate, normal pitch, normal volume      Level of Cooperation: cooperative      Thought Processes: linear, logical,  goal-directed   Mood: angry      Thought Content: {relevant and appropriate   Affect: congruent and appropriate   Orientation: Oriented x4   Memory/Attention/Concentration: No gross cognitive deficits made evident during conversation   Judgment & Insight: poor   Language  intact          No data to display                    5/17/2024     1:10 PM   GAD7   1. Feeling nervous, anxious, or on edge? 2   2. Not being able to stop or control worrying? 3   3. Worrying too much about different things? 3   4. Trouble relaxing? 0   5. Being so restless that it is hard to sit still? 2   6. Becoming easily annoyed or irritable? 2   7. Feeling afraid as if something awful might happen? 1   8. If you checked off any problems, how difficult have these problems made it for you to do your work, take care of things at home, or get along with other people? 1   NINI-7 Score 13       Impression: Initial appointment focused on gathering history, identifying treatment goals and developing a treatment plan.      Diagnosis:  1. Recurrent major depressive disorder, in partial remission        2. Complicated grief  Ambulatory referral/consult to Primary Care Behavioral Health (Non-Opioids)        Patient wants to see a EMDR therapist

## 2024-07-15 ENCOUNTER — OFFICE VISIT (OUTPATIENT)
Dept: FAMILY MEDICINE | Facility: CLINIC | Age: 74
End: 2024-07-15
Payer: MEDICARE

## 2024-07-15 VITALS
WEIGHT: 182.75 LBS | OXYGEN SATURATION: 97 % | HEART RATE: 68 BPM | SYSTOLIC BLOOD PRESSURE: 122 MMHG | HEIGHT: 62 IN | BODY MASS INDEX: 33.63 KG/M2 | RESPIRATION RATE: 16 BRPM | DIASTOLIC BLOOD PRESSURE: 62 MMHG

## 2024-07-15 DIAGNOSIS — F33.0 MILD EPISODE OF RECURRENT MAJOR DEPRESSIVE DISORDER: Primary | ICD-10-CM

## 2024-07-15 PROBLEM — D69.2 NONTHROMBOCYTOPENIC PURPURA: Status: RESOLVED | Noted: 2021-04-22 | Resolved: 2024-07-15

## 2024-07-15 PROCEDURE — 99999 PR PBB SHADOW E&M-EST. PATIENT-LVL IV: CPT | Mod: PBBFAC,,, | Performed by: STUDENT IN AN ORGANIZED HEALTH CARE EDUCATION/TRAINING PROGRAM

## 2024-07-15 PROCEDURE — 99213 OFFICE O/P EST LOW 20 MIN: CPT | Mod: S$GLB,,, | Performed by: STUDENT IN AN ORGANIZED HEALTH CARE EDUCATION/TRAINING PROGRAM

## 2024-07-15 PROCEDURE — 1160F RVW MEDS BY RX/DR IN RCRD: CPT | Mod: CPTII,S$GLB,, | Performed by: STUDENT IN AN ORGANIZED HEALTH CARE EDUCATION/TRAINING PROGRAM

## 2024-07-15 PROCEDURE — 3078F DIAST BP <80 MM HG: CPT | Mod: CPTII,S$GLB,, | Performed by: STUDENT IN AN ORGANIZED HEALTH CARE EDUCATION/TRAINING PROGRAM

## 2024-07-15 PROCEDURE — 1101F PT FALLS ASSESS-DOCD LE1/YR: CPT | Mod: CPTII,S$GLB,, | Performed by: STUDENT IN AN ORGANIZED HEALTH CARE EDUCATION/TRAINING PROGRAM

## 2024-07-15 PROCEDURE — 3288F FALL RISK ASSESSMENT DOCD: CPT | Mod: CPTII,S$GLB,, | Performed by: STUDENT IN AN ORGANIZED HEALTH CARE EDUCATION/TRAINING PROGRAM

## 2024-07-15 PROCEDURE — 3008F BODY MASS INDEX DOCD: CPT | Mod: CPTII,S$GLB,, | Performed by: STUDENT IN AN ORGANIZED HEALTH CARE EDUCATION/TRAINING PROGRAM

## 2024-07-15 PROCEDURE — 1157F ADVNC CARE PLAN IN RCRD: CPT | Mod: CPTII,S$GLB,, | Performed by: STUDENT IN AN ORGANIZED HEALTH CARE EDUCATION/TRAINING PROGRAM

## 2024-07-15 PROCEDURE — 1126F AMNT PAIN NOTED NONE PRSNT: CPT | Mod: CPTII,S$GLB,, | Performed by: STUDENT IN AN ORGANIZED HEALTH CARE EDUCATION/TRAINING PROGRAM

## 2024-07-15 PROCEDURE — G2211 COMPLEX E/M VISIT ADD ON: HCPCS | Mod: S$GLB,,, | Performed by: STUDENT IN AN ORGANIZED HEALTH CARE EDUCATION/TRAINING PROGRAM

## 2024-07-15 PROCEDURE — 1159F MED LIST DOCD IN RCRD: CPT | Mod: CPTII,S$GLB,, | Performed by: STUDENT IN AN ORGANIZED HEALTH CARE EDUCATION/TRAINING PROGRAM

## 2024-07-15 PROCEDURE — 3074F SYST BP LT 130 MM HG: CPT | Mod: CPTII,S$GLB,, | Performed by: STUDENT IN AN ORGANIZED HEALTH CARE EDUCATION/TRAINING PROGRAM

## 2024-07-15 RX ORDER — SERTRALINE HYDROCHLORIDE 100 MG/1
150 TABLET, FILM COATED ORAL DAILY
Qty: 135 TABLET | Refills: 3 | Status: SHIPPED | OUTPATIENT
Start: 2024-07-15 | End: 2025-07-10

## 2024-07-15 NOTE — PROGRESS NOTES
"[facility] CLINIC NOTE    Patient Name: Syeda Gonsalves  YOB: 1950    PRESENTING HISTORY     History of Present Illness:  Ms. Syeda Gonsalves is a 73 y.o. female here for short f/u on depression.     Complicated grief re to suicide of fiancee 5 years ago.   On chronic sertraline 150mg. Occ TCA at low dose for sleep.     At last visit added adjunct Wellbutrin and referred for psychotherapy.     Wellbutrin- caused neck pain for several days. She stopped a few days after starting.     Reports that she developed bronchitis re zoloft when she first started it. Difficult to treat. Required multiple presentations, shots, abxs. I believe she relates this to argue in favor of no medication changes.    Did not find therapy helpful.     She reports a fear of wilmer bronchitis. In light of this I recommend considering smoking cessation. She does not feel these are related because she does not inhale and she is not really interested in cessation right now.     ROS      OBJECTIVE:   Vital Signs:  Vitals:    07/15/24 1543   BP: 122/62   Pulse: 68   Resp: 16   SpO2: 97%   Weight: 82.9 kg (182 lb 12.2 oz)   Height: 5' 2" (1.575 m)          Physical Exam  Vitals and nursing note reviewed.   Constitutional:       Appearance: She is not ill-appearing, toxic-appearing or diaphoretic.   HENT:      Head: Normocephalic and atraumatic.      Right Ear: External ear normal.      Left Ear: External ear normal.   Eyes:      General: No scleral icterus.     Conjunctiva/sclera: Conjunctivae normal.      Pupils: Pupils are equal, round, and reactive to light.   Neck:      Thyroid: No thyromegaly.   Cardiovascular:      Rate and Rhythm: Normal rate and regular rhythm.      Heart sounds: Normal heart sounds. No murmur heard.  Pulmonary:      Effort: Pulmonary effort is normal. No respiratory distress.      Breath sounds: Normal breath sounds. No wheezing or rales.   Musculoskeletal:         General: No " tenderness or deformity. Normal range of motion.      Cervical back: Normal range of motion and neck supple.      Right lower leg: No edema.      Left lower leg: No edema.   Lymphadenopathy:      Cervical: No cervical adenopathy.   Skin:     General: Skin is warm and dry.      Findings: No erythema or rash.   Neurological:      Mental Status: She is alert and oriented to person, place, and time.      Gait: Gait is intact.   Psychiatric:         Mood and Affect: Mood and affect normal.         Cognition and Memory: Memory normal.         Judgment: Judgment normal.         ASSESSMENT & PLAN:     Mild episode of recurrent major depressive disorder  -     sertraline (ZOLOFT) 100 MG tablet; Take 1.5 tablets (150 mg total) by mouth once daily. 125mg daily  Dispense: 135 tablet; Refill: 3    Will make no changes at this time.   Add in regular exercise component which has been shown to be helpful for depression symptoms.       Surinder Saldaña  Internal Medicine    Visit complexity inherent to evaluation and management associated with medical care services that serve as the continuing focal point for all needed health care services and/or with medical care services that are part of ongoing care related to a patient's single, serious condition or a complex condition provided today

## 2024-09-19 ENCOUNTER — OFFICE VISIT (OUTPATIENT)
Dept: URGENT CARE | Facility: CLINIC | Age: 74
End: 2024-09-19
Payer: MEDICARE

## 2024-09-19 VITALS
WEIGHT: 183 LBS | SYSTOLIC BLOOD PRESSURE: 122 MMHG | OXYGEN SATURATION: 98 % | TEMPERATURE: 99 F | RESPIRATION RATE: 19 BRPM | DIASTOLIC BLOOD PRESSURE: 74 MMHG | HEART RATE: 75 BPM | BODY MASS INDEX: 33.68 KG/M2 | HEIGHT: 62 IN

## 2024-09-19 DIAGNOSIS — R05.9 COUGH, UNSPECIFIED TYPE: Primary | ICD-10-CM

## 2024-09-19 DIAGNOSIS — J20.9 ACUTE BRONCHITIS, UNSPECIFIED ORGANISM: ICD-10-CM

## 2024-09-19 DIAGNOSIS — R09.81 SINUS CONGESTION: ICD-10-CM

## 2024-09-19 PROCEDURE — 71046 X-RAY EXAM CHEST 2 VIEWS: CPT | Mod: S$GLB,,, | Performed by: RADIOLOGY

## 2024-09-19 RX ORDER — PREDNISONE 20 MG/1
40 TABLET ORAL DAILY
Qty: 10 TABLET | Refills: 0 | Status: SHIPPED | OUTPATIENT
Start: 2024-09-19 | End: 2024-09-24

## 2024-09-19 RX ORDER — PROMETHAZINE HYDROCHLORIDE AND DEXTROMETHORPHAN HYDROBROMIDE 6.25; 15 MG/5ML; MG/5ML
5 SYRUP ORAL EVERY 6 HOURS PRN
Qty: 180 ML | Refills: 0 | Status: SHIPPED | OUTPATIENT
Start: 2024-09-19 | End: 2024-09-29

## 2024-09-19 RX ORDER — AZITHROMYCIN 250 MG/1
TABLET, FILM COATED ORAL
Qty: 6 TABLET | Refills: 0 | Status: SHIPPED | OUTPATIENT
Start: 2024-09-19 | End: 2024-09-24

## 2024-09-19 NOTE — PROGRESS NOTES
"Subjective:      Patient ID: Syeda Gonsalves is a 73 y.o. female.    Vitals:  height is 5' 2" (1.575 m) and weight is 83 kg (183 lb). Her oral temperature is 98.7 °F (37.1 °C). Her blood pressure is 122/74 and her pulse is 75. Her respiration is 19 and oxygen saturation is 98%.     Chief Complaint: Cough    Cough  Associated symptoms include headaches. Pertinent negatives include no rash.  73-year-old female presents to clinic complaining of productive cough.  The cough onset greater than 1 week ago.  She denies fever or chills.  No known sick contacts or travel.  She denies any shortness a breath or wheezing.  Previous medical history includes, depression, IBS.    Constitution: Negative.   HENT:  Positive for congestion.    Neck: neck negative.   Cardiovascular: Negative.    Eyes:  Negative for eye discharge, eye itching and eye pain.   Respiratory:  Positive for cough and sputum production.    Gastrointestinal: Negative.    Genitourinary:  Negative for dysuria, frequency, urgency, urine decreased and flank pain.   Musculoskeletal:  Negative for pain, joint pain, joint swelling and abnormal ROM of joint.   Skin:  Negative for color change, rash, wound and erythema.   Allergic/Immunologic: Negative.    Neurological:  Positive for headaches.   Hematologic/Lymphatic: Negative.    Psychiatric/Behavioral: Negative.      Objective:     Physical Exam   Constitutional: She is oriented to person, place, and time.  Non-toxic appearance. She appears ill. No distress. normal  HENT:   Ears:   Right Ear: Tympanic membrane normal.   Left Ear: Tympanic membrane normal.   Nose: Rhinorrhea and congestion present.   Mouth/Throat: Mucous membranes are moist. No oropharyngeal exudate. Oropharynx is clear.   Neck: Neck supple.   Cardiovascular: Normal rate, regular rhythm, normal heart sounds and normal pulses.   No murmur heard.Exam reveals no gallop.   Pulmonary/Chest: Effort normal. No respiratory distress. She has no wheezes. She " has rhonchi. She has no rales.   Abdominal: Normal appearance. Soft.   Musculoskeletal: Normal range of motion.         General: Normal range of motion.   Neurological: no focal deficit. She is alert and oriented to person, place, and time.   Skin: Skin is warm, dry, not pale and no rash. Capillary refill takes less than 2 seconds. No erythema   Psychiatric: Her behavior is normal. Mood, judgment and thought content normal.   Nursing note and vitals reviewed.    Assessment:     1. Cough, unspecified type    2. Sinus congestion        Plan:   Vital signs stable.  No hypoxia.  No respiratory distress.  Chest x-ray independently interpreted by myself and negative for acute process.    COVID, influenza negative.    Cough, unspecified type  -     SARS Coronavirus 2 Antigen, POCT Manual Read  -     POCT Influenza A/B Rapid Antigen  -     XR CHEST PA AND LATERAL; Future; Expected date: 09/19/2024    Sinus congestion  -     SARS Coronavirus 2 Antigen, POCT Manual Read  -     POCT Influenza A/B Rapid Antigen

## 2024-09-19 NOTE — LETTER
September 19, 2024      [facility]  [address]  [facility] [address]  Phone: [phone]       Patient: Syeda Gonsalves   YOB: 1950  Date of Visit: 09/19/2024    To Whom It May Concern:    Mable Gonsalves  was at [facility] on 09/19/2024. The patient may return to work/school on 9/21/204 with no restrictions. If you have any questions or concerns, or if I can be of further assistance, please do not hesitate to contact me.    Sincerely,    Lev Mejia NP

## 2024-09-25 DIAGNOSIS — Z00.00 ENCOUNTER FOR MEDICARE ANNUAL WELLNESS EXAM: ICD-10-CM

## 2024-12-07 ENCOUNTER — OFFICE VISIT (OUTPATIENT)
Dept: URGENT CARE | Facility: CLINIC | Age: 74
End: 2024-12-07
Payer: MEDICARE

## 2024-12-07 VITALS
WEIGHT: 182 LBS | TEMPERATURE: 98 F | BODY MASS INDEX: 33.49 KG/M2 | OXYGEN SATURATION: 97 % | HEIGHT: 62 IN | RESPIRATION RATE: 15 BRPM | DIASTOLIC BLOOD PRESSURE: 70 MMHG | HEART RATE: 72 BPM | SYSTOLIC BLOOD PRESSURE: 105 MMHG

## 2024-12-07 DIAGNOSIS — J32.9 BACTERIAL SINUSITIS: Primary | ICD-10-CM

## 2024-12-07 DIAGNOSIS — B96.89 BACTERIAL SINUSITIS: Primary | ICD-10-CM

## 2024-12-07 DIAGNOSIS — J02.9 SORE THROAT: ICD-10-CM

## 2024-12-07 LAB
CTP QC/QA: YES
S PYO RRNA THROAT QL PROBE: NEGATIVE

## 2024-12-07 PROCEDURE — 87880 STREP A ASSAY W/OPTIC: CPT | Mod: QW,,, | Performed by: NURSE PRACTITIONER

## 2024-12-07 PROCEDURE — 99214 OFFICE O/P EST MOD 30 MIN: CPT | Mod: S$GLB,,, | Performed by: NURSE PRACTITIONER

## 2024-12-07 RX ORDER — PROMETHAZINE HYDROCHLORIDE AND DEXTROMETHORPHAN HYDROBROMIDE 6.25; 15 MG/5ML; MG/5ML
5 SYRUP ORAL EVERY 4 HOURS PRN
Qty: 118 ML | Refills: 0 | Status: SHIPPED | OUTPATIENT
Start: 2024-12-07 | End: 2024-12-17

## 2024-12-07 RX ORDER — AZITHROMYCIN 250 MG/1
TABLET, FILM COATED ORAL
Qty: 6 TABLET | Refills: 0 | Status: SHIPPED | OUTPATIENT
Start: 2024-12-07

## 2024-12-07 RX ORDER — PREDNISONE 10 MG/1
10 TABLET ORAL DAILY
Qty: 5 TABLET | Refills: 0 | Status: SHIPPED | OUTPATIENT
Start: 2024-12-07 | End: 2024-12-12

## 2024-12-07 NOTE — PROGRESS NOTES
"Subjective:      Patient ID: Syeda Gonsalves is a 73 y.o. female.    Vitals:  height is 5' 2" (1.575 m) and weight is 82.6 kg (182 lb). Her temperature is 97.9 °F (36.6 °C). Her blood pressure is 105/70 and her pulse is 72. Her respiration is 15 and oxygen saturation is 97%.     Chief Complaint: Sore Throat and Sinus Problem    Patient complains of cough, sinus congestion/pressure, sore throat, and nasal congestion x 2 days.      HENT:  Positive for congestion and sinus pressure.    Respiratory:  Positive for cough.       Objective:     Physical Exam   Constitutional: She is oriented to person, place, and time.  Non-toxic appearance. She does not appear ill. No distress. normal  HENT:   Head: Normocephalic and atraumatic.   Ears:   Right Ear: Tympanic membrane, external ear and ear canal normal.   Left Ear: Tympanic membrane, external ear and ear canal normal.   Nose: Congestion present. Right sinus exhibits maxillary sinus tenderness. Left sinus exhibits maxillary sinus tenderness.   Mouth/Throat: Mucous membranes are moist. No posterior oropharyngeal erythema. Oropharynx is clear.   Eyes: Conjunctivae are normal. Extraocular movement intact   Neck: Neck supple. No neck rigidity present.   Cardiovascular: Normal rate, regular rhythm, normal heart sounds and normal pulses.   Pulmonary/Chest: Effort normal and breath sounds normal.   Abdominal: Normal appearance.   Musculoskeletal: Normal range of motion.         General: Normal range of motion.      Cervical back: She exhibits no tenderness.   Lymphadenopathy:     She has no cervical adenopathy.   Neurological: She is alert and oriented to person, place, and time.   Skin: Skin is warm, dry and not diaphoretic.   Psychiatric: Her behavior is normal.   Vitals reviewed.    Results for orders placed or performed in visit on 12/07/24   POCT rapid strep A    Collection Time: 12/07/24  2:07 PM   Result Value Ref Range    Rapid Strep A Screen Negative Negative    Quality " Control Acceptable Yes     No results found.     Assessment:     1. Bacterial sinusitis    2. Sore throat        Plan:       Bacterial sinusitis  -     azithromycin (Z-DIGNA) 250 MG tablet; Take 2 tablets by mouth on day 1; Take 1 tablet by mouth on days 2-5  Dispense: 6 tablet; Refill: 0  -     predniSONE (DELTASONE) 10 MG tablet; Take 1 tablet (10 mg total) by mouth once daily. for 5 days  Dispense: 5 tablet; Refill: 0  -     promethazine-dextromethorphan (PROMETHAZINE-DM) 6.25-15 mg/5 mL Syrp; Take 5 mLs by mouth every 4 (four) hours as needed (Cough).  Dispense: 118 mL; Refill: 0    Sore throat  -     POCT rapid strep A      INSTRUCTIONS  Meds as prescribed. Follow up as advised.

## 2025-01-02 ENCOUNTER — OFFICE VISIT (OUTPATIENT)
Dept: URGENT CARE | Facility: CLINIC | Age: 75
End: 2025-01-02
Payer: MEDICARE

## 2025-01-02 VITALS
BODY MASS INDEX: 33.31 KG/M2 | HEART RATE: 65 BPM | DIASTOLIC BLOOD PRESSURE: 82 MMHG | TEMPERATURE: 98 F | OXYGEN SATURATION: 98 % | WEIGHT: 181 LBS | SYSTOLIC BLOOD PRESSURE: 142 MMHG | RESPIRATION RATE: 18 BRPM | HEIGHT: 62 IN

## 2025-01-02 DIAGNOSIS — G89.29 CHRONIC LEFT HIP PAIN: Primary | ICD-10-CM

## 2025-01-02 DIAGNOSIS — M25.552 CHRONIC LEFT HIP PAIN: Primary | ICD-10-CM

## 2025-01-02 PROCEDURE — 73502 X-RAY EXAM HIP UNI 2-3 VIEWS: CPT | Mod: LT,S$GLB,, | Performed by: RADIOLOGY

## 2025-01-02 RX ORDER — LIDOCAINE 50 MG/G
1 PATCH TOPICAL DAILY
Qty: 7 PATCH | Refills: 0 | Status: SHIPPED | OUTPATIENT
Start: 2025-01-02 | End: 2025-01-09

## 2025-01-02 RX ORDER — KETOROLAC TROMETHAMINE 30 MG/ML
15 INJECTION, SOLUTION INTRAMUSCULAR; INTRAVENOUS
Status: COMPLETED | OUTPATIENT
Start: 2025-01-02 | End: 2025-01-02

## 2025-01-02 RX ORDER — CELECOXIB 200 MG/1
200 CAPSULE ORAL 2 TIMES DAILY PRN
Qty: 14 CAPSULE | Refills: 0 | Status: SHIPPED | OUTPATIENT
Start: 2025-01-02 | End: 2025-01-09

## 2025-01-02 RX ADMIN — KETOROLAC TROMETHAMINE 15 MG: 30 INJECTION, SOLUTION INTRAMUSCULAR; INTRAVENOUS at 03:01

## 2025-01-02 NOTE — PATIENT INSTRUCTIONS
Increase clear fluid intake  May apply ice to affected area for 15 minutes every 2 hours.  After 48 hours may progress to moist heat or heating pad.  Take care not to fall sleep on heating pad as this may cause severe burns  Elevate area at rest  Take Celebrex as directed.  Take each dose with a full meal.  Do not take any additional NSAIDs while taking Celebrex.  You may take additional Tylenol as needed between doses   May apply lidocaine patch over hip once daily as needed   Follow-up with primary care provider and orthopedic referral Return to clinic for new or worse symptoms

## 2025-01-02 NOTE — PROGRESS NOTES
"Subjective:      Patient ID: Syeda Gonsalves is a 74 y.o. female.    Vitals:  height is 5' 2" (1.575 m) and weight is 82.1 kg (181 lb). Her oral temperature is 98.3 °F (36.8 °C). Her blood pressure is 142/82 (abnormal) and her pulse is 65. Her respiration is 18 and oxygen saturation is 98%.     Chief Complaint: Hip Pain    74-year-old female seen today for acute on chronic left hip pain.  She states she has a history of left hip pain in his usually seen by an orthopedist for routine intra-articular steroid injections.  She states she has had a very busy work schedule recently and been on her feet more than usual and has began to have increasing hip pain.  She denies any fever or trauma.    Hip Pain   The incident occurred 5 to 7 days ago. The incident occurred at home. There was no injury mechanism. The pain is present in the left hip. The quality of the pain is described as aching and stabbing. The pain is at a severity of 10/10. The pain is severe. The pain has been Constant since onset. Associated symptoms include an inability to bear weight. She reports no foreign bodies present. The symptoms are aggravated by movement, palpation and weight bearing. She has tried heat, acetaminophen, non-weight bearing, rest and NSAIDs for the symptoms. The treatment provided no relief.       Constitution: Negative for chills and fever.   Cardiovascular:  Negative for chest pain, palpitations and sob on exertion.   Respiratory:  Negative for cough and shortness of breath.    Gastrointestinal:  Negative for nausea and vomiting.   Musculoskeletal:  Positive for pain and joint pain. Negative for trauma and abnormal ROM of joint.   Neurological:  Negative for dizziness, light-headedness, passing out, disorientation and altered mental status.   Psychiatric/Behavioral:  Negative for altered mental status, disorientation and confusion.       Objective:     Physical Exam   Constitutional: She is oriented to person, place, and time. " She appears well-developed. She is cooperative.  Non-toxic appearance. She does not appear ill. No distress.   HENT:   Head: Normocephalic and atraumatic.   Ears:   Right Ear: Hearing and external ear normal.   Left Ear: Hearing and external ear normal.   Nose: Nose normal. No mucosal edema, rhinorrhea, nasal deformity or congestion. No epistaxis. Right sinus exhibits no maxillary sinus tenderness and no frontal sinus tenderness. Left sinus exhibits no maxillary sinus tenderness and no frontal sinus tenderness.   Mouth/Throat: Uvula is midline, oropharynx is clear and moist and mucous membranes are normal. Mucous membranes are moist. No trismus in the jaw. Normal dentition. No uvula swelling. No oropharyngeal exudate, posterior oropharyngeal edema or posterior oropharyngeal erythema.   Eyes: Conjunctivae and lids are normal. No scleral icterus.   Neck: Trachea normal and phonation normal. Neck supple. No edema present. No erythema present. No neck rigidity present.   Cardiovascular: Normal rate, regular rhythm, normal heart sounds and normal pulses.   Pulmonary/Chest: Effort normal and breath sounds normal. No stridor. No respiratory distress. She has no decreased breath sounds. She has no rhonchi.   Abdominal: Normal appearance.   Musculoskeletal: Normal range of motion.         General: No deformity. Normal range of motion.   Neurological: no focal deficit. She is alert and oriented to person, place, and time. She exhibits normal muscle tone. Coordination normal.   Skin: Skin is warm, dry, intact, not diaphoretic and not pale. Capillary refill takes 2 to 3 seconds.   Psychiatric: Her speech is normal and behavior is normal. Judgment and thought content normal.   Nursing note and vitals reviewed.      Assessment:     1. Chronic left hip pain        Plan:       Chronic left hip pain  -     X-Ray Hip 2 or 3 views Left with Pelvis when performed; Future; Expected date: 01/02/2025  -     ketorolac injection 15 mg  -      celecoxib (CELEBREX) 200 MG capsule; Take 1 capsule (200 mg total) by mouth 2 (two) times daily as needed for Pain.  Dispense: 14 capsule; Refill: 0  -     LIDOcaine (LIDODERM) 5 %; Place 1 patch onto the skin once daily. Remove & Discard patch within 12 hours . Only one patch daily. for 7 days  Dispense: 7 patch; Refill: 0  -     Ambulatory referral/consult to Orthopedics    I have independently reviewed the x-ray films and see no obvious fracture or deformity.  Some degenerative changes seen.  These results and the  physical exam findings were discussed with the patient and all questions answered. We discussed symptom monitoring, conservative care methods, medication use, and follow up orders.  I have ordered an orthopedic referral for her for further management.  she verbalized understanding and agreement with the plan of care.

## 2025-01-30 DIAGNOSIS — Z00.00 ENCOUNTER FOR MEDICARE ANNUAL WELLNESS EXAM: ICD-10-CM

## 2025-01-30 NOTE — TELEPHONE ENCOUNTER
History of Present Illness   Nora Pierce is a 78 year old female being seen for blood pressure check.  I invited her to bring her daughter in the room but she declined.    Hypertension/atrial fibrillation-she is currently taking metoprolol 50 mg twice daily and hydrochlorothiazide 12.5 mg daily.  Her last dose was approximately 6 hours ago of both medications.  She denies any side effects, weakness or dizziness.  She asks if she should resume taking Eliquis.    Past Medical History     Past Medical History:   Diagnosis Date    Anxiety     Arthritis     Atrial fibrillation  (CMD)     Atrial fibrillation with RVR  (CMD) 04/19/2024    Cancer of skin 2012      reports that she has never smoked. She has never used smokeless tobacco.    Health Maintenance     Health Maintenance   Topic Date Due    Shingles Vaccine (1 of 2) Never done    Hepatitis C Screening  Never done    Osteoporosis Screening  Never done    Respiratory Syncytial Virus (RSV) Vaccine 60+ (1 - 1-dose 75+ series) Never done    Influenza Vaccine (1) Never done    Depression Screening  01/30/2026    DTaP/Tdap/Td Vaccine (3 - Td or Tdap) 04/25/2034    COVID-19 Vaccine  Completed    Pneumococcal Vaccine 50+  Completed    Hepatitis A Vaccine  Aged Out    Meningococcal Vaccine  Aged Out    Hepatitis B Vaccine (For Physician/[facility] Discussion)  Aged Out    Meningococcal Serogroup B Vaccine  Aged Out    HPV Vaccine  Aged Out       Allergies    ALLERGIES:  No Known Allergies    Medications     No current facility-administered medications for this visit.     Review of Systems   No fever, body aches, chills, visual disturbances, weakness, sore throat, cough, congestion, chest pain, SOB, abdominal pain, leg swelling or paresthesia.    Physical Exam      Vitals:    01/30/25 1309 01/30/25 1347   BP: 131/66 (!) 144/71   BP Location: LUE - Left upper extremity    Patient Position: Sitting    Cuff Size: Regular    Pulse: (!) 60    Temp: 97.5 °F (36.4 °C)   Pt is requesting to speak w/ you regarding possibly needing to see a surgeon.      TempSrc: Temporal    SpO2: 99%    Weight: 64.4 kg (142 lb)    Height: 5' (1.524 m)      Body mass index is 27.73 kg/m².  General:  Alert and oriented.  Cooperative, no acute distress.  Well groomed.   HEENT:  Atraumatic.  PERRL.  TM's pearly grey light reflex noted bilaterally.  Turbinates pink and moist.  Pharynx pink and without exudate.  Mucous membranes moist, oral cavity without lesions.  Neck:  Supple with normal movement.  No lymphadenopathy.  Trachea is midline.   Cardiovascular:  Regular S1 and S2.  No S3, S4 murmurs or thrills.  Pulmonary:  CTA.  No use of accessory muscles, intercostal retraction or respiratory distress noted.   Abdomen:  Soft.  Nontender, nondistended, normoactive bowel sounds.  No hepatosplenomegaly.  No rebound tenderness.  No masses or hernias present.  Extremities:  No pedal edema.   Skin:  Moist.  No pallor.  No exanthem.  Good skin turgor.   Neuro:  CN 2-12 grossly intact.  Strong, steady gilmore gait.    Laboratory Data            Assessment and Plan      Hypertension  Atrial fibrillation  increase dose of hydrochlorothiazide to 25 mg daily.  Continue metoprolol 50 mg twice daily.  Stay active.  Healthy eating recommended.    Follow up: 2 weeks for blood pressure check.  States she will be leaving town and will schedule a follow-up appointment when she is able.  Cristina Harkins NP

## 2025-04-11 ENCOUNTER — OFFICE VISIT (OUTPATIENT)
Dept: URGENT CARE | Facility: CLINIC | Age: 75
End: 2025-04-11
Payer: MEDICARE

## 2025-04-11 VITALS
RESPIRATION RATE: 16 BRPM | HEIGHT: 62 IN | HEART RATE: 67 BPM | TEMPERATURE: 97 F | OXYGEN SATURATION: 96 % | BODY MASS INDEX: 33.31 KG/M2 | WEIGHT: 181 LBS | DIASTOLIC BLOOD PRESSURE: 74 MMHG | SYSTOLIC BLOOD PRESSURE: 119 MMHG

## 2025-04-11 DIAGNOSIS — M79.672 LEFT FOOT PAIN: Primary | ICD-10-CM

## 2025-04-11 NOTE — PATIENT INSTRUCTIONS
Be certain to keep appointment already scheduled with podiatrist on Tuesday    Limit activity and ambulation not to aggravate symptoms    Keep left foot elevated as much as possible

## 2025-04-11 NOTE — PROGRESS NOTES
"Subjective:      Patient ID: Syeda Gonsalves is a 74 y.o. female.    Vitals:  height is 5' 2" (1.575 m) and weight is 82.1 kg (181 lb). Her oral temperature is 97.3 °F (36.3 °C). Her blood pressure is 119/74 and her pulse is 67. Her respiration is 16 and oxygen saturation is 96%.     Chief Complaint: Foot Pain    Left foot pain X 2 days.  Patient states she is on her feet long hours at work, but no specific trauma to foot.     Patient reports she has a history of similar occurrence in the past.  Has not appointment already scheduled with Podiatry on Tuesday need a note for work.  Denies injury or trauma however pain to her left inner foot radiating to the upper part of her foot near the ankle became significantly more uncomfortable while at work walking a lot    Foot Pain  This is a new problem. The current episode started in the past 7 days. Pertinent negatives include no joint swelling, numbness or rash. She has tried NSAIDs for the symptoms.       Musculoskeletal:  Positive for pain (Left foot) and arthritis. Negative for trauma, joint swelling, abnormal ROM of joint and gout.   Skin:  Negative for rash, wound, erythema and bruising.   Neurological:  Negative for numbness and tingling.      Objective:     Physical Exam   Constitutional: She is oriented to person, place, and time.  Non-toxic appearance. She does not appear ill. No distress.   HENT:   Head: Normocephalic and atraumatic.   Nose: Nose normal.   Mouth/Throat: Mucous membranes are moist.   Eyes: Conjunctivae are normal.   Cardiovascular: Normal rate.   Pulmonary/Chest: Effort normal. No respiratory distress.   Abdominal: Normal appearance.   Musculoskeletal: Normal range of motion.         General: Tenderness present. No swelling, deformity or signs of injury. Normal range of motion.      Right lower leg: No edema.      Left lower leg: No edema.      Left foot: Comments: Red marking denotes area of TTP, no erythema, no edema no warmth to touch.  " Skin intact no signs of injury or trauma        Feet:    Neurological: no focal deficit. She is alert and oriented to person, place, and time.   Skin: Skin is warm, dry and no rash. Capillary refill takes 2 to 3 seconds. No bruising, No erythema and No lesion   Psychiatric: Her behavior is normal. Mood normal.   Nursing note and vitals reviewed.      Assessment:     1. Left foot pain        Plan:       Left foot pain                Patient was requiring a work excuse due to her complaint and already has an appointment scheduled with Podiatry in 4 days.  Does not desire any further intervention or evaluation at this time

## 2025-04-11 NOTE — LETTER
April 11, 2025      [facility]  [address]  [facility] [address]  Phone: [phone]       Patient: Syeda Gonsalves   YOB: 1950  Date of Visit: 04/11/2025    To Whom It May Concern:    Mable Gonsalves  was at [facility] on 04/11/2025. The patient may return to work once cleared by Podiatry. If you have any questions or concerns, or if I can be of further assistance, please do not hesitate to contact me.    Sincerely,    Lata Flores, NP

## 2025-04-17 ENCOUNTER — OFFICE VISIT (OUTPATIENT)
Dept: PODIATRY | Facility: CLINIC | Age: 75
End: 2025-04-17
Payer: MEDICARE

## 2025-04-17 ENCOUNTER — HOSPITAL ENCOUNTER (OUTPATIENT)
Dept: RADIOLOGY | Facility: CLINIC | Age: 75
Discharge: HOME | End: 2025-04-17
Payer: MEDICARE

## 2025-04-17 VITALS — WEIGHT: 182.56 LBS | RESPIRATION RATE: 16 BRPM | BODY MASS INDEX: 33.6 KG/M2 | HEIGHT: 62 IN

## 2025-04-17 DIAGNOSIS — M79.672 LEFT FOOT PAIN: ICD-10-CM

## 2025-04-17 DIAGNOSIS — M79.672 PAIN OF LEFT HEEL: ICD-10-CM

## 2025-04-17 DIAGNOSIS — M72.2 PLANTAR FASCIITIS OF LEFT FOOT: Primary | ICD-10-CM

## 2025-04-17 PROCEDURE — 1157F ADVNC CARE PLAN IN RCRD: CPT | Mod: CPTII,S$GLB,,

## 2025-04-17 PROCEDURE — 73630 X-RAY EXAM OF FOOT: CPT | Mod: LT,S$GLB,, | Performed by: RADIOLOGY

## 2025-04-17 PROCEDURE — 1101F PT FALLS ASSESS-DOCD LE1/YR: CPT | Mod: CPTII,S$GLB,,

## 2025-04-17 PROCEDURE — 3288F FALL RISK ASSESSMENT DOCD: CPT | Mod: CPTII,S$GLB,,

## 2025-04-17 PROCEDURE — 1160F RVW MEDS BY RX/DR IN RCRD: CPT | Mod: CPTII,S$GLB,,

## 2025-04-17 PROCEDURE — 1159F MED LIST DOCD IN RCRD: CPT | Mod: CPTII,S$GLB,,

## 2025-04-17 PROCEDURE — 99214 OFFICE O/P EST MOD 30 MIN: CPT | Mod: S$GLB,,,

## 2025-04-17 PROCEDURE — 1125F AMNT PAIN NOTED PAIN PRSNT: CPT | Mod: CPTII,S$GLB,,

## 2025-04-17 PROCEDURE — 99999 PR PBB SHADOW E&M-EST. PATIENT-LVL III: CPT | Mod: PBBFAC,,,

## 2025-04-17 RX ORDER — CELECOXIB 200 MG/1
200 CAPSULE ORAL 2 TIMES DAILY PRN
Qty: 30 CAPSULE | Refills: 0 | Status: SHIPPED | OUTPATIENT
Start: 2025-04-17

## 2025-04-17 NOTE — PROGRESS NOTES
"  [address] [facility] [address]  [place] [address]  Phone: [phone]   [phone]    Patient's PCP:Surinder Saldaña MD  Referring Provider: No ref. provider found    Subjective:      Chief Complaint:: Left heel pain    HPI  Syeda Gonsalves is a 74 y.o. female who presents today with a complaint of left foot pain . The current episode started one week.  The symptoms include heavy pain from lateral plantar surface left heel. Probable cause of complaint unknown however, pain started 4/9/25 while at work states she dose have to climb ladders at her job.  The symptoms are aggravated by first steps in the morning. The problem has stayed the same. Treatment to date have included brace, aleve, tylenol (increased pain) which provided some relief.     Vitals:    04/17/25 1436   Resp: 16   Weight: 82.8 kg (182 lb 8.7 oz)   Height: 5' 2" (1.575 m)   PainSc:   4      Shoe Size: 9    Past Surgical History:   Procedure Laterality Date    ADENOIDECTOMY      ADRENALECTOMY      CARPAL TUNNEL RELEASE      CHOLECYSTECTOMY      COLONOSCOPY N/A 12/21/2017    Procedure: COLONOSCOPY;  Surgeon: Harriett Pereyra MD;  Location: [facility] ENDO;  Service: Endoscopy;  Laterality: N/A;    ESOPHAGOGASTRODUODENOSCOPY N/A 07/18/2018    Procedure: EGD (ESOPHAGOGASTRODUODENOSCOPY);  Surgeon: Harriett Pereyra MD;  Location: [facility] ENDO;  Service: Endoscopy;  Laterality: N/A;    EYE SURGERY      HYSTERECTOMY      OOPHORECTOMY      PERCUTANEOUS TENOTOMY Right 07/27/2022    Procedure: TENOTOMY, PERCUTANEOUS;  Surgeon: Doris Galan DO;  Location: [facility] OR;  Service: Orthopedics;  Laterality: Right;    TONSILLECTOMY      UPPER GASTROINTESTINAL ENDOSCOPY       Past Medical History:   Diagnosis Date    Abnormal Pap smear 1990    Allergy to statin medication     Anxiety     Arthritis     Asthma     Back pain     Cervical cancer     Depression     GERD (gastroesophageal reflux disease)     H/O head and neck radiation 01/11/2017    " Hyperlipidemia     Kidney insufficiency     Multinodular goiter 01/11/2017    Obesity     Smoking 01/11/2017    Uterine cancer      Family History   Problem Relation Name Age of Onset    Macular degeneration Mother      Heart disease Mother          MI    Heart disease Father          MI    Macular degeneration Brother      Hyperlipidemia Brother      ANTHONY disease Maternal Grandmother      Bladder Cancer Paternal Grandmother      Heart disease Paternal Grandfather          MI    ANTHONY disease Daughter      Diabetes Son      Breast cancer Neg Hx      Colon cancer Neg Hx      Ovarian cancer Neg Hx      Cancer Neg Hx          Social History:   Marital Status:   Alcohol History:  reports no history of alcohol use.  Tobacco History:  reports that she has been smoking cigarettes. She started smoking about 27 years ago. She has a 6.8 pack-year smoking history. She has never used smokeless tobacco.  Drug History:  reports no history of drug use.    Review of patient's allergies indicates:   Allergen Reactions    Flu vaccine 2011 (36 mos+)(pf) Anaphylaxis    Iodine and iodide containing products     Lipitor [atorvastatin]     Niacin preparations     Statins-hmg-coa reductase inhibitors Hives    Welchol [colesevelam] Other (See Comments)     GI upset and insomnia    Zetia [ezetimibe] Nausea And Vomiting       Current Medications[1]    Review of Systems   Constitutional:  Negative for activity change, chills and fever.   Cardiovascular:  Negative for leg swelling.   Gastrointestinal:  Negative for diarrhea, nausea and vomiting.   Musculoskeletal:  Negative for arthralgias, gait problem, joint swelling and myalgias.   Skin:  Negative for color change, pallor, rash and wound.   Neurological:  Negative for dizziness, tremors, weakness and numbness.         Objective:        Physical Exam:   Foot Exam    General  General Appearance: appears stated age and healthy   Orientation: alert and oriented to person, place, and time    Affect: appropriate       Right Foot/Ankle     Inspection and Palpation  Ecchymosis: none  Tenderness: none   Swelling: none   Arch: normal (mild arch collapse)  Hammertoes: second toe, third toe, fourth toe and fifth toe  Claw Toes: absent  Hallux valgus: no  Hallux limitus: no  Skin Exam: skin intact; no blister, no callus, no dry skin, no tinea and no ulcer   Neurovascular  Dorsalis pedis: 2+  Posterior tibial: 2+  Capillary Refill: 3+  Saphenous nerve sensation: normal  Tibial nerve sensation: normal  Superficial peroneal nerve sensation: normal  Deep peroneal nerve sensation: normal  Sural nerve sensation: normal    Muscle Strength  Ankle dorsiflexion: 5  Ankle plantar flexion: 5  Ankle inversion: 5  Ankle eversion: 5  Great toe extension: 5  Great toe flexion: 5    Range of Motion    Passive  Ankle dorsiflexion: 5  Ankle eversion: 10  Ankle inversion: 20  1st MTP extension: 55      Tests  Anterior drawer: negative   Varus tilt: negative   Calcaneal squeeze: negative   Talar tilt: negative   PT Tinel's sign: negative    Valleix sign: negative    Left Foot/Ankle      Inspection and Palpation  Ecchymosis: none  Tenderness: plantar fascia and calcaneus tenderness   Swelling: none   Arch: normal  Hammertoes: second toe, third toe, fourth toe and fifth toe  Claw toes: absent  Hallux valgus: no  Hallux limitus: no  Skin Exam: skin intact; no blister, no callus, no dry skin, no tinea and no ulcer   Neurovascular  Dorsalis pedis: 2+  Posterior tibial: 2+  Capillary refill: 3+  Saphenous nerve sensation: normal  Tibial nerve sensation: normal  Superficial peroneal nerve sensation: normal  Deep peroneal nerve sensation: normal  Sural nerve sensation: normal    Muscle Strength  Ankle dorsiflexion: 5  Ankle plantar flexion: 5  Ankle inversion: 5  Ankle eversion: 5  Great toe extension: 5  Great toe flexion: 5    Range of Motion    Passive  Ankle dorsiflexion: 0  Ankle eversion: 10  Ankle inversion: 20  1st MTP extension:  55      Tests  Anterior drawer: negative   Varus Tilt: negative   Calcaneal squeeze: negative   Talar tilt: negative   PT Tinel's sign: negative  Valleix sign: negative  Comments  Tenderness with palpation to the plantar heel medial calcaneal tuberosity, at the origin of the plantar fascia.   Negative calcaneal squeeze test  Negative Tinel's sign at the tarsal tunnel   Negative calcaneal nerve pain   No adjacent or associated soft tissue mass   No pain with ankle joint dorsiflexion   No pain with palpation of the posterior heel   Ne edema, erythema, or ecchymosis appreciated     Ankle joint limited dorsiflexory range of motion; 0 degrees or less of passive AJ DF, 0 - 5 degrees of passive AJ DF with knee extended         Imaging:    XR Results:  Results for orders placed in visit on 04/17/25    X-Ray Foot Complete Left    Narrative  EXAMINATION:  XR FOOT COMPLETE 3 VIEW LEFT    CLINICAL HISTORY:  .  Pain in left foot    TECHNIQUE:  AP, lateral and oblique views of the left foot were performed.    COMPARISON:  None    FINDINGS:  A fracture of the bones of the left foot is not seen. Soft tissue swelling or foreign bodies are not noted. Juxta-articular bone erosion or Osteoporosis is not seen.  A small irregular heel spur is noted.    Impression  Small irregular heel spur otherwise negative radiographs of the left foot.      Electronically signed by: Kalin Edouard MD  Date:    04/17/2025  Time:    15:43           Assessment:       1. Plantar fasciitis of left foot    2. Pain of left heel    3. Left foot pain      Plan:   Plantar fasciitis of left foot  -     celecoxib (CELEBREX) 200 MG capsule; Take 1 capsule (200 mg total) by mouth 2 (two) times daily as needed for Pain.  Dispense: 30 capsule; Refill: 0    Pain of left heel  -     X-Ray Foot Complete Left    Left foot pain        Counseling: I provided patient education verbally regarding: Patient diagnosis, treatment options, as well as alternatives, risks, and  benefits.   Personally reviewed X-ray left Foot, Three Views, and radiologists report. Reviewed findings with patient. Digital contractures with adductovarus rotation/drift digits 2-5. Large posterior heel enthesiophyte at the insertion of the achilles tendon. Irregular plantar heal spurring. No fracture/dislocation.   Patient will perform tendo-Achilles complex directed stretches demonstrated in clinic  Patient will trial over-the-counter orthotics to help support the arch and plantar fascia as well as to help evenly distribute distribute foot loading pressure, and take tension off the PT tendon.  Break-in period reviewed.  Patient will utilize a tennis ball and frozen water bottle to help gently stretch the plantar fascia as instructed in clinic.    Patient will obtain a night splint to perform passive stretching of the tendo-Achilles complex, recommend trying to wear for at least 2-4 hours/day, reviewed may be tripping/falling hazard if wore at night  Recommended shoe gear modifications include a stiff shank, deep heel cup, and wide toe box.  Most recent CBC, CMP, HgA1c, and current medications reviewed.   Celebrex as directed   Follow up in 4 weeks, or prn sooner if symptoms worsen  This note was created using Dragon voice recognition software that occasionally misinterpreted phrases or words.   Carrington Duque DPM  Podiatry / Foot and Ankle Surgery   Secure chat preferred            [1]   Current Outpatient Medications   Medication Sig Dispense Refill    albuterol (VENTOLIN HFA) 90 mcg/actuation inhaler Inhale 2 puffs into the lungs every 6 (six) hours as needed for Wheezing. Rescue 18 g 0    amitriptyline (ELAVIL) 10 MG tablet Take 1 tablet (10 mg total) by mouth nightly as needed for Insomnia. 30 tablet 1    azithromycin (Z-DIGNA) 250 MG tablet Take 2 tablets by mouth on day 1; Take 1 tablet by mouth on days 2-5 (Patient not taking: Reported on 1/2/2025) 6 tablet 0    celecoxib (CELEBREX) 200 MG capsule Take 1  capsule (200 mg total) by mouth 2 (two) times daily as needed for Pain. 30 capsule 0    clindamycin phosphate 1% (CLINDAGEL) 1 % gel Apply topically 2 (two) times daily. 60 g 3    fluocinolone acetonide oiL 0.01 % Drop 3-4 drops to the affected ear(s) twice daily no more than a week at a time as needed for itching and scaling 20 mL 0    fluticasone propionate (FLONASE) 50 mcg/actuation nasal spray 1 spray (50 mcg total) by Each Nostril route once daily. 15.8 mL 0    gabapentin (NEURONTIN) 300 MG capsule 1 p.o. in the morning 1 p.o. in the afternoon and 2 p.o. at night 120 capsule 11    GARLIC (GARLIQUE ORAL) Take 1 capsule by mouth once daily.      magnesium oxide (MAG-OX) 400 mg (241.3 mg magnesium) tablet Take 400 mg by mouth once daily.      MULTIVITAMIN (MULTIPLE VITAMINS ORAL) Take by mouth.      red yeast rice 600 mg Cap Take 2 capsules by mouth every evening.      sertraline (ZOLOFT) 100 MG tablet Take 1.5 tablets (150 mg total) by mouth once daily. 125mg daily 135 tablet 3     No current facility-administered medications for this visit.

## 2025-07-07 ENCOUNTER — OFFICE VISIT (OUTPATIENT)
Dept: FAMILY MEDICINE | Facility: CLINIC | Age: 75
End: 2025-07-07
Payer: MEDICARE

## 2025-07-07 VITALS
HEIGHT: 62 IN | BODY MASS INDEX: 33.68 KG/M2 | WEIGHT: 183 LBS | SYSTOLIC BLOOD PRESSURE: 122 MMHG | OXYGEN SATURATION: 97 % | TEMPERATURE: 98 F | DIASTOLIC BLOOD PRESSURE: 70 MMHG | HEART RATE: 72 BPM

## 2025-07-07 DIAGNOSIS — F33.41 RECURRENT MAJOR DEPRESSIVE DISORDER, IN PARTIAL REMISSION: ICD-10-CM

## 2025-07-07 DIAGNOSIS — D69.2 NONTHROMBOCYTOPENIC PURPURA: ICD-10-CM

## 2025-07-07 DIAGNOSIS — Z00.00 ENCOUNTER FOR MEDICARE ANNUAL WELLNESS EXAM: Primary | ICD-10-CM

## 2025-07-07 PROCEDURE — 3288F FALL RISK ASSESSMENT DOCD: CPT | Mod: CPTII,S$GLB,, | Performed by: NURSE PRACTITIONER

## 2025-07-07 PROCEDURE — 1159F MED LIST DOCD IN RCRD: CPT | Mod: CPTII,S$GLB,, | Performed by: NURSE PRACTITIONER

## 2025-07-07 PROCEDURE — 3074F SYST BP LT 130 MM HG: CPT | Mod: CPTII,S$GLB,, | Performed by: NURSE PRACTITIONER

## 2025-07-07 PROCEDURE — 1160F RVW MEDS BY RX/DR IN RCRD: CPT | Mod: CPTII,S$GLB,, | Performed by: NURSE PRACTITIONER

## 2025-07-07 PROCEDURE — 99999 PR PBB SHADOW E&M-EST. PATIENT-LVL IV: CPT | Mod: PBBFAC,,, | Performed by: NURSE PRACTITIONER

## 2025-07-07 PROCEDURE — G0439 PPPS, SUBSEQ VISIT: HCPCS | Mod: S$GLB,,, | Performed by: NURSE PRACTITIONER

## 2025-07-07 PROCEDURE — 3078F DIAST BP <80 MM HG: CPT | Mod: CPTII,S$GLB,, | Performed by: NURSE PRACTITIONER

## 2025-07-07 PROCEDURE — 1123F ACP DISCUSS/DSCN MKR DOCD: CPT | Mod: CPTII,S$GLB,, | Performed by: NURSE PRACTITIONER

## 2025-07-07 PROCEDURE — 1101F PT FALLS ASSESS-DOCD LE1/YR: CPT | Mod: CPTII,S$GLB,, | Performed by: NURSE PRACTITIONER

## 2025-07-07 PROCEDURE — 1125F AMNT PAIN NOTED PAIN PRSNT: CPT | Mod: CPTII,S$GLB,, | Performed by: NURSE PRACTITIONER

## 2025-07-07 PROCEDURE — 1170F FXNL STATUS ASSESSED: CPT | Mod: CPTII,S$GLB,, | Performed by: NURSE PRACTITIONER

## 2025-07-07 NOTE — PATIENT INSTRUCTIONS
Counseling and Referral of Other Preventative  (Italic type indicates deductible and co-insurance are waived)    Patient Name: Syeda Gonsalves  Today's Date: 7/7/2025    Health Maintenance       Date Due Completion Date    TETANUS VACCINE 05/08/2024 5/8/2014    COVID-19 Vaccine (4 - 2024-25 season) 09/01/2024 10/28/2021    Lipid Panel 12/06/2024 12/6/2023    RSV Vaccine (Age 60+ and Pregnant patients) (1 - 1-dose 75+ series) Never done ---    Colorectal Cancer Screening 09/01/2026 9/1/2023    Override on 1/22/2017: Done (sent to scans,)    DEXA Scan 09/05/2028 9/5/2023        No orders of the defined types were placed in this encounter.    The following information is provided to all patients.  This information is to help you find resources for any of the problems found today that may be affecting your health:                  Living healthy guide: www.[facility].[place].[facility]      Understanding Diabetes: www.diabetes.org      Eating healthy: www.cdc.gov/healthyweight      CDC home safety checklist: www.cdc.gov/steadi/patient.html      Agency on Aging: www.goea.[place].gov      Alcoholics anonymous (AA): www.aa.org      Physical Activity: www.[place].nih.gov/zu3wwgy      Tobacco use: www.quitwithusla.org

## 2025-07-07 NOTE — PROGRESS NOTES
"    Syeda Gonsalves presented for a  Medicare AWV and comprehensive Health Risk Assessment today. The following components were reviewed and updated:    Medical history  Family History  Social history  Allergies and Current Medications  Health Risk Assessment  Health Maintenance  Care Team         ** See Completed Assessments for Annual Wellness Visit within the encounter summary.**         The following assessments were completed:  Living Situation  CAGE  Depression Screening  Timed Get Up and Go  Whisper Test  Cognitive Function Screening  Nutrition Screening  ADL Screening  PAQ Screening    Clock in media  Opioid documentation:      Patient does not have a current opioid prescription.        Vitals:    07/07/25 1242   BP: 122/70   Pulse: 72   Temp: 97.8 °F (36.6 °C)   TempSrc: Oral   SpO2: 97%   Weight: 83 kg (182 lb 15.7 oz)   Height: 5' 2" (1.575 m)     Body mass index is 33.47 kg/m².  Physical Exam  Constitutional:       Appearance: She is well-developed.   HENT:      Head: Normocephalic and atraumatic.      Nose: Nose normal.   Eyes:      General: Lids are normal.      Conjunctiva/sclera: Conjunctivae normal.   Cardiovascular:      Rate and Rhythm: Normal rate.   Pulmonary:      Effort: Pulmonary effort is normal.   Neurological:      Mental Status: She is alert and oriented to person, place, and time.   Psychiatric:         Speech: Speech normal.         Behavior: Behavior normal.               Diagnoses and health risks identified today and associated recommendations/orders:    1. Encounter for Medicare annual wellness exam  Discussed health maintenance guidelines appropriate for age.      - Referral to Enhanced Annual Wellness Visit (eAWV) W+1    2. Recurrent major depressive disorder, in partial remission  Stable, continue current medication  Followed by pcp    3. Nonthrombocytopenic purpura  Stable, continue to monitor       Provided Syeda with a 5-10 year written screening schedule and personal " prevention plan. Recommendations were developed using the USPSTF age appropriate recommendations. Education, counseling, and referrals were provided as needed. After Visit Summary printed and given to patient which includes a list of additional screenings\tests needed.    Follow up for One year for Annual Wellness Visit.    Raisa Russ, NP    I offered to discuss advanced care planning, including how to pick a person who would make decisions for you if you were unable to make them for yourself, called a health care power of , and what kind of decisions you might make such as use of life sustaining treatments such as ventilators and tube feeding when faced with a life limiting illness recorded on a living will that they will need to know. (How you want to be cared for as you near the end of your natural life)     X  Patient has advanced directives on file, which we reviewed, and they do not wish to make changes.

## 2025-08-04 ENCOUNTER — OFFICE VISIT (OUTPATIENT)
Dept: FAMILY MEDICINE | Facility: CLINIC | Age: 75
End: 2025-08-04
Payer: MEDICARE

## 2025-08-04 ENCOUNTER — LAB VISIT (OUTPATIENT)
Dept: LAB | Facility: HOSPITAL | Age: 75
End: 2025-08-04
Attending: STUDENT IN AN ORGANIZED HEALTH CARE EDUCATION/TRAINING PROGRAM
Payer: MEDICARE

## 2025-08-04 VITALS
BODY MASS INDEX: 33.87 KG/M2 | HEIGHT: 62 IN | SYSTOLIC BLOOD PRESSURE: 132 MMHG | OXYGEN SATURATION: 97 % | WEIGHT: 184.06 LBS | RESPIRATION RATE: 18 BRPM | HEART RATE: 74 BPM | DIASTOLIC BLOOD PRESSURE: 76 MMHG

## 2025-08-04 DIAGNOSIS — Z13.220 ENCOUNTER FOR LIPID SCREENING FOR CARDIOVASCULAR DISEASE: ICD-10-CM

## 2025-08-04 DIAGNOSIS — F33.0 MILD EPISODE OF RECURRENT MAJOR DEPRESSIVE DISORDER: ICD-10-CM

## 2025-08-04 DIAGNOSIS — Z13.6 ENCOUNTER FOR LIPID SCREENING FOR CARDIOVASCULAR DISEASE: ICD-10-CM

## 2025-08-04 DIAGNOSIS — Z00.00 ROUTINE GENERAL MEDICAL EXAMINATION AT A HEALTH CARE FACILITY: Primary | ICD-10-CM

## 2025-08-04 DIAGNOSIS — Z00.00 ROUTINE GENERAL MEDICAL EXAMINATION AT A HEALTH CARE FACILITY: ICD-10-CM

## 2025-08-04 LAB
ABSOLUTE EOSINOPHIL (OHS): 0.18 K/UL
ABSOLUTE MONOCYTE (OHS): 0.84 K/UL (ref 0.3–1)
ABSOLUTE NEUTROPHIL COUNT (OHS): 5.86 K/UL (ref 1.8–7.7)
ALBUMIN SERPL BCP-MCNC: 3.7 G/DL (ref 3.5–5.2)
ALP SERPL-CCNC: 72 UNIT/L (ref 40–150)
ALT SERPL W/O P-5'-P-CCNC: 18 UNIT/L (ref 0–55)
ANION GAP (OHS): 8 MMOL/L (ref 8–16)
AST SERPL-CCNC: 25 UNIT/L (ref 0–50)
BASOPHILS # BLD AUTO: 0.04 K/UL
BASOPHILS NFR BLD AUTO: 0.5 %
BILIRUB SERPL-MCNC: 0.4 MG/DL (ref 0.1–1)
BUN SERPL-MCNC: 16 MG/DL (ref 8–23)
CALCIUM SERPL-MCNC: 9 MG/DL (ref 8.7–10.5)
CHLORIDE SERPL-SCNC: 106 MMOL/L (ref 95–110)
CHOLEST SERPL-MCNC: 211 MG/DL (ref 120–199)
CHOLEST/HDLC SERPL: 6.4 {RATIO} (ref 2–5)
CO2 SERPL-SCNC: 24 MMOL/L (ref 23–29)
CREAT SERPL-MCNC: 0.8 MG/DL (ref 0.5–1.4)
ERYTHROCYTE [DISTWIDTH] IN BLOOD BY AUTOMATED COUNT: 13.3 % (ref 11.5–14.5)
GFR SERPLBLD CREATININE-BSD FMLA CKD-EPI: >60 ML/MIN/1.73/M2
GLUCOSE SERPL-MCNC: 78 MG/DL (ref 70–110)
HCT VFR BLD AUTO: 45.1 % (ref 37–48.5)
HDLC SERPL-MCNC: 33 MG/DL (ref 40–75)
HDLC SERPL: 15.6 % (ref 20–50)
HGB BLD-MCNC: 15 GM/DL (ref 12–16)
IMM GRANULOCYTES # BLD AUTO: 0.05 K/UL (ref 0–0.04)
IMM GRANULOCYTES NFR BLD AUTO: 0.6 % (ref 0–0.5)
LDLC SERPL CALC-MCNC: 127.6 MG/DL (ref 63–159)
LYMPHOCYTES # BLD AUTO: 1.34 K/UL (ref 1–4.8)
MCH RBC QN AUTO: 28.7 PG (ref 27–31)
MCHC RBC AUTO-ENTMCNC: 33.3 G/DL (ref 32–36)
MCV RBC AUTO: 86 FL (ref 82–98)
NONHDLC SERPL-MCNC: 178 MG/DL
NUCLEATED RBC (/100WBC) (OHS): 0 /100 WBC
PLATELET # BLD AUTO: 172 K/UL (ref 150–450)
PMV BLD AUTO: 10.7 FL (ref 9.2–12.9)
POTASSIUM SERPL-SCNC: 4.5 MMOL/L (ref 3.5–5.1)
PROT SERPL-MCNC: 7.1 GM/DL (ref 6–8.4)
RBC # BLD AUTO: 5.22 M/UL (ref 4–5.4)
RELATIVE EOSINOPHIL (OHS): 2.2 %
RELATIVE LYMPHOCYTE (OHS): 16.1 % (ref 18–48)
RELATIVE MONOCYTE (OHS): 10.1 % (ref 4–15)
RELATIVE NEUTROPHIL (OHS): 70.5 % (ref 38–73)
SODIUM SERPL-SCNC: 138 MMOL/L (ref 136–145)
TRIGL SERPL-MCNC: 252 MG/DL (ref 30–150)
WBC # BLD AUTO: 8.31 K/UL (ref 3.9–12.7)

## 2025-08-04 PROCEDURE — 80053 COMPREHEN METABOLIC PANEL: CPT

## 2025-08-04 PROCEDURE — 80061 LIPID PANEL: CPT

## 2025-08-04 PROCEDURE — 85025 COMPLETE CBC W/AUTO DIFF WBC: CPT

## 2025-08-04 PROCEDURE — 36415 COLL VENOUS BLD VENIPUNCTURE: CPT | Mod: PO

## 2025-08-04 PROCEDURE — 99999 PR PBB SHADOW E&M-EST. PATIENT-LVL IV: CPT | Mod: PBBFAC,,, | Performed by: STUDENT IN AN ORGANIZED HEALTH CARE EDUCATION/TRAINING PROGRAM

## 2025-08-04 RX ORDER — SERTRALINE HYDROCHLORIDE 100 MG/1
100 TABLET, FILM COATED ORAL DAILY
Qty: 90 TABLET | Refills: 4 | Status: SHIPPED | OUTPATIENT
Start: 2025-08-04 | End: 2026-07-30

## 2025-08-04 NOTE — PROGRESS NOTES
Patient ID: Syeda Gonsalves is a 74 y.o. female.    Chief Complaint: Annual Exam    History of Present Illness    CHIEF COMPLAINT:  Patient presents today for routine follow up.    MEDICATIONS:  She takes Zoloft 100 mg daily and amitriptyline as needed for sleep, typically used during sleepless nights.    SOCIAL HISTORY:  She currently smokes half pack per day with approximately 30 years of intermittent smoking history (15 pack years). She reports not fully inhaling and uses smoking as a coping mechanism to calm nerves. She acknowledges difficulty with cessation, citing emotional dependence on smoking for stress management. She maintains a health-conscious diet with a focus on consuming cabbage, which she considers highly beneficial.      ROS:  General: -fever, -chills, -fatigue, -weight gain, -weight loss  Eyes: -vision changes, -redness, -discharge  ENT: -ear pain, -nasal congestion, -sore throat  Cardiovascular: -chest pain, -palpitations, -lower extremity edema  Respiratory: -cough, -shortness of breath  Gastrointestinal: -abdominal pain, -nausea, -vomiting, -diarrhea, -constipation, -blood in stool  Genitourinary: -dysuria, -hematuria, -frequency  Musculoskeletal: -joint pain, -muscle pain  Skin: -rash, -lesion  Neurological: -headache, -dizziness, -numbness, -tingling  Psychiatric: +anxiety, -depression, +sleep difficulty, +nervousness         Physical Exam    General: No acute distress. Well-developed. Well-nourished.  Eyes: EOMI. Sclerae anicteric.  HENT: Normocephalic. Atraumatic. Nares patent. Moist oral mucosa.  Ears: Bilateral TMs clear. Bilateral EACs clear.  Cardiovascular: Regular rate. Regular rhythm. No murmurs. No rubs. No gallops. Normal S1, S2.  Respiratory: Normal respiratory effort. Clear to auscultation bilaterally. No rales. No rhonchi. No wheezing.  Abdomen: Soft. Non-tender. Non-distended. Normoactive bowel sounds.  Musculoskeletal: No  obvious deformity.  Extremities: No lower extremity  edema.  Neurological: Alert & oriented x3. No slurred speech. Normal gait.  Psychiatric: Normal mood. Normal affect. Good insight. Good judgment.  Skin: Warm. Dry. No rash.         Assessment & Plan    F33.0 Mild episode of recurrent major depressive disorder  Z00.00 Routine general medical exam at a health care facility  Z13.220, Z13.6 Encounter for lipid screening for cardiovascular disease  E78.5 Hyperlipidemia, unspecified    IMPRESSION:  - BP WNL.  - Evaluated current Zoloft dosage and its effectiveness.  - Lung cancer screening unnecessary due to patient's reported non-inhalation and estimated pack-year history.    MILD EPISODE OF RECURRENT MAJOR DEPRESSIVE DISORDER:  - Continue Zoloft 100 mg daily and Amitriptyline (Elavil) as needed for sleep.    ROUTINE GENERAL MEDICAL EXAMINATION AT A HEALTH CARE FACILITY:  - Ordered labs: complete blood count, liver function, renal function, electrolytes for monitoring purposes.    ENCOUNTER FOR LIPID SCREENING FOR CARDIOVASCULAR DISEASE:  - Ordered labs: cholesterol for monitoring purposes.    HYPERLIPIDEMIA, UNSPECIFIED:  - Discussed with the patient about getting labs to evaluate current lipid levels.    LIFESTYLE CHANGES:  - Explained pack-years concept in relation to smoking history and lung cancer screening recommendations.  - Discussed variability of lung cancer symptoms, particularly cough, depending on potential tumor location.          Syeda was seen today for annual exam.    Diagnoses and all orders for this visit:    Routine general medical examination at a health care facility  -     Lipid Panel; Future  -     Comprehensive Metabolic Panel; Future  -     CBC Auto Differential; Future    Mild episode of recurrent major depressive disorder  -     sertraline (ZOLOFT) 100 MG tablet; Take 1 tablet (100 mg total) by mouth once daily. 125mg daily  -     Lipid Panel; Future  -     Comprehensive Metabolic Panel; Future  -     CBC Auto Differential; Future    Encounter  for lipid screening for cardiovascular disease  -     Lipid Panel; Future          No follow-ups on file.    This note was generated with the assistance of ambient listening technology. Verbal consent was obtained by the patient and accompanying visitor(s) for the recording of patient appointment to facilitate this note. I attest to having reviewed and edited the generated note for accuracy, though some syntax or spelling errors may persist. Please contact the author of this note for any clarification.

## (undated) DEVICE — GOWN POLY REINF BRTH SLV XL

## (undated) DEVICE — GLOVE SURG ULTRA TOUCH 7

## (undated) DEVICE — NDL SPINAL SPINOCAN 22GX3.5

## (undated) DEVICE — SPONGE BULKEE II ABSRB 6X6.75

## (undated) DEVICE — SOL NS 1000CC

## (undated) DEVICE — SYS LABEL CORRECT MED

## (undated) DEVICE — SKINMARKER W/RULER DEVON

## (undated) DEVICE — DRAPE THREE-QTR REINF 53X77IN

## (undated) DEVICE — NDL SPINAL 25GX3.5 SPINOCAN

## (undated) DEVICE — GLOVE SURG ULTRA TOUCH 6

## (undated) DEVICE — TOWEL OR DISP STRL BLUE 4/PK

## (undated) DEVICE — APPLICATOR CHLORAPREP ORN 26ML

## (undated) DEVICE — CONTAINER SPECIMEN OR STER 4OZ

## (undated) DEVICE — NDL HYPO 27G X 1 1/2

## (undated) DEVICE — GOWN POLY REINF BRTH SLV LG

## (undated) DEVICE — NDL SPINAL 18GX3.5 SPINOCAN

## (undated) DEVICE — SYR 10CC LUER LOCK

## (undated) DEVICE — DRAPE MINOR FEN 98X77X121IN

## (undated) DEVICE — Device